# Patient Record
Sex: FEMALE | Race: BLACK OR AFRICAN AMERICAN | NOT HISPANIC OR LATINO
[De-identification: names, ages, dates, MRNs, and addresses within clinical notes are randomized per-mention and may not be internally consistent; named-entity substitution may affect disease eponyms.]

---

## 2017-01-05 ENCOUNTER — APPOINTMENT (OUTPATIENT)
Dept: HEART AND VASCULAR | Facility: CLINIC | Age: 82
End: 2017-01-05

## 2017-01-05 VITALS — DIASTOLIC BLOOD PRESSURE: 70 MMHG | HEART RATE: 63 BPM | SYSTOLIC BLOOD PRESSURE: 120 MMHG

## 2017-01-10 ENCOUNTER — FORM ENCOUNTER (OUTPATIENT)
Age: 82
End: 2017-01-10

## 2017-01-11 ENCOUNTER — OUTPATIENT (OUTPATIENT)
Dept: OUTPATIENT SERVICES | Facility: HOSPITAL | Age: 82
LOS: 1 days | End: 2017-01-11
Payer: MEDICARE

## 2017-01-11 ENCOUNTER — TRANSCRIPTION ENCOUNTER (OUTPATIENT)
Age: 82
End: 2017-01-11

## 2017-01-11 PROCEDURE — 27093 INJECTION FOR HIP X-RAY: CPT | Mod: LT

## 2017-01-11 PROCEDURE — 73525 CONTRAST X-RAY OF HIP: CPT

## 2017-01-11 PROCEDURE — 77002 NEEDLE LOCALIZATION BY XRAY: CPT | Mod: 26

## 2017-01-11 PROCEDURE — 27093 INJECTION FOR HIP X-RAY: CPT

## 2017-01-12 LAB
25(OH)D3 SERPL-MCNC: 32.4 NG/ML
ALBUMIN SERPL ELPH-MCNC: 4 G/DL
ALP BLD-CCNC: 74 U/L
ALT SERPL-CCNC: 21 U/L
ANION GAP SERPL CALC-SCNC: 13 MMOL/L
AST SERPL-CCNC: 24 U/L
BASOPHILS # BLD AUTO: 0.02 K/UL
BASOPHILS NFR BLD AUTO: 0.2 %
BILIRUB SERPL-MCNC: 0.3 MG/DL
BUN SERPL-MCNC: 22 MG/DL
CALCIUM SERPL-MCNC: 10.4 MG/DL
CHLORIDE SERPL-SCNC: 101 MMOL/L
CHOLEST SERPL-MCNC: 227 MG/DL
CHOLEST/HDLC SERPL: 4.1 RATIO
CO2 SERPL-SCNC: 26 MMOL/L
CREAT SERPL-MCNC: 1.22 MG/DL
CRP SERPL HS-MCNC: 6.8 MG/L
EOSINOPHIL # BLD AUTO: 0.14 K/UL
EOSINOPHIL NFR BLD AUTO: 1.7 %
FOLATE SERPL-MCNC: >20 NG/ML
GLUCOSE SERPL-MCNC: 106 MG/DL
HBA1C MFR BLD HPLC: 6.2 %
HCT VFR BLD CALC: 36.9 %
HDLC SERPL-MCNC: 56 MG/DL
HGB BLD-MCNC: 11.9 G/DL
IMM GRANULOCYTES NFR BLD AUTO: 0.4 %
LDLC SERPL CALC-MCNC: 161 MG/DL
LYMPHOCYTES # BLD AUTO: 1.63 K/UL
LYMPHOCYTES NFR BLD AUTO: 19.5 %
MAGNESIUM RBC-MCNC: 3.9 MG/DL
MAN DIFF?: NORMAL
MCHC RBC-ENTMCNC: 28.7 PG
MCHC RBC-ENTMCNC: 32.2 GM/DL
MCV RBC AUTO: 88.9 FL
MONOCYTES # BLD AUTO: 0.66 K/UL
MONOCYTES NFR BLD AUTO: 7.9 %
NEUTROPHILS # BLD AUTO: 5.89 K/UL
NEUTROPHILS NFR BLD AUTO: 70.3 %
NT-PROBNP SERPL-MCNC: 415 PG/ML
PLATELET # BLD AUTO: 320 K/UL
POTASSIUM SERPL-SCNC: 4.7 MMOL/L
PROT SERPL-MCNC: 7.1 G/DL
RBC # BLD: 4.15 M/UL
RBC # FLD: 13.6 %
SODIUM SERPL-SCNC: 140 MMOL/L
T3FREE SERPL-MCNC: 3.77 PG/ML
T4 FREE SERPL-MCNC: 1.6 NG/DL
TRIGL SERPL-MCNC: 49 MG/DL
TSH SERPL-ACNC: 0.1 UU/ML
VIT B12 SERPL-MCNC: 721 PG/ML
WBC # FLD AUTO: 8.37 K/UL

## 2017-01-13 ENCOUNTER — CLINICAL ADVICE (OUTPATIENT)
Age: 82
End: 2017-01-13

## 2017-01-18 ENCOUNTER — APPOINTMENT (OUTPATIENT)
Dept: HEART AND VASCULAR | Facility: CLINIC | Age: 82
End: 2017-01-18

## 2017-01-18 DIAGNOSIS — I87.2 VENOUS INSUFFICIENCY (CHRONIC) (PERIPHERAL): ICD-10-CM

## 2017-01-27 ENCOUNTER — APPOINTMENT (OUTPATIENT)
Dept: ORTHOPEDIC SURGERY | Facility: CLINIC | Age: 82
End: 2017-01-27

## 2017-01-27 DIAGNOSIS — M24.7 PROTRUSIO ACETABULI: ICD-10-CM

## 2017-02-02 ENCOUNTER — APPOINTMENT (OUTPATIENT)
Dept: HEART AND VASCULAR | Facility: CLINIC | Age: 82
End: 2017-02-02

## 2017-02-02 ENCOUNTER — APPOINTMENT (OUTPATIENT)
Dept: ENDOCRINOLOGY | Facility: CLINIC | Age: 82
End: 2017-02-02

## 2017-02-02 VITALS
HEIGHT: 60 IN | SYSTOLIC BLOOD PRESSURE: 144 MMHG | DIASTOLIC BLOOD PRESSURE: 84 MMHG | BODY MASS INDEX: 22.78 KG/M2 | WEIGHT: 116 LBS | HEART RATE: 72 BPM

## 2017-02-02 VITALS — HEART RATE: 63 BPM | DIASTOLIC BLOOD PRESSURE: 68 MMHG | SYSTOLIC BLOOD PRESSURE: 120 MMHG

## 2017-02-02 VITALS — SYSTOLIC BLOOD PRESSURE: 90 MMHG | DIASTOLIC BLOOD PRESSURE: 80 MMHG | HEART RATE: 63 BPM

## 2017-02-02 DIAGNOSIS — R06.02 SHORTNESS OF BREATH: ICD-10-CM

## 2017-02-02 DIAGNOSIS — R52 PAIN, UNSPECIFIED: ICD-10-CM

## 2017-02-14 ENCOUNTER — APPOINTMENT (OUTPATIENT)
Dept: INTERNAL MEDICINE | Facility: CLINIC | Age: 82
End: 2017-02-14

## 2017-02-14 VITALS
HEART RATE: 63 BPM | BODY MASS INDEX: 22.78 KG/M2 | DIASTOLIC BLOOD PRESSURE: 80 MMHG | TEMPERATURE: 98.6 F | OXYGEN SATURATION: 98 % | HEIGHT: 60 IN | WEIGHT: 116 LBS | SYSTOLIC BLOOD PRESSURE: 120 MMHG

## 2017-02-14 DIAGNOSIS — R10.2 PELVIC AND PERINEAL PAIN: ICD-10-CM

## 2017-02-23 ENCOUNTER — APPOINTMENT (OUTPATIENT)
Dept: ENDOCRINOLOGY | Facility: CLINIC | Age: 82
End: 2017-02-23

## 2017-02-23 VITALS
HEIGHT: 60 IN | WEIGHT: 123 LBS | HEART RATE: 67 BPM | BODY MASS INDEX: 24.15 KG/M2 | SYSTOLIC BLOOD PRESSURE: 116 MMHG | DIASTOLIC BLOOD PRESSURE: 73 MMHG

## 2017-03-01 ENCOUNTER — FORM ENCOUNTER (OUTPATIENT)
Age: 82
End: 2017-03-01

## 2017-03-02 ENCOUNTER — OUTPATIENT (OUTPATIENT)
Dept: OUTPATIENT SERVICES | Facility: HOSPITAL | Age: 82
LOS: 1 days | End: 2017-03-02
Payer: MEDICARE

## 2017-03-02 PROCEDURE — 76536 US EXAM OF HEAD AND NECK: CPT

## 2017-03-02 PROCEDURE — 76830 TRANSVAGINAL US NON-OB: CPT

## 2017-03-02 PROCEDURE — 76830 TRANSVAGINAL US NON-OB: CPT | Mod: 26

## 2017-03-02 PROCEDURE — 76856 US EXAM PELVIC COMPLETE: CPT | Mod: 26

## 2017-03-02 PROCEDURE — 76856 US EXAM PELVIC COMPLETE: CPT

## 2017-03-02 PROCEDURE — 76536 US EXAM OF HEAD AND NECK: CPT | Mod: 26

## 2017-03-10 ENCOUNTER — APPOINTMENT (OUTPATIENT)
Dept: OPHTHALMOLOGY | Facility: CLINIC | Age: 82
End: 2017-03-10

## 2017-03-10 DIAGNOSIS — H40.9 UNSPECIFIED GLAUCOMA: ICD-10-CM

## 2017-03-10 DIAGNOSIS — L30.9 DERMATITIS, UNSPECIFIED: ICD-10-CM

## 2017-03-10 DIAGNOSIS — H53.149 VISUAL DISCOMFORT, UNSPECIFIED: ICD-10-CM

## 2017-03-20 ENCOUNTER — APPOINTMENT (OUTPATIENT)
Dept: NEPHROLOGY | Facility: CLINIC | Age: 82
End: 2017-03-20

## 2017-03-20 VITALS
DIASTOLIC BLOOD PRESSURE: 70 MMHG | RESPIRATION RATE: 1 BRPM | TEMPERATURE: 98 F | BODY MASS INDEX: 23.83 KG/M2 | SYSTOLIC BLOOD PRESSURE: 150 MMHG | WEIGHT: 122 LBS | HEART RATE: 78 BPM

## 2017-04-23 ENCOUNTER — FORM ENCOUNTER (OUTPATIENT)
Age: 82
End: 2017-04-23

## 2017-04-23 ENCOUNTER — RESULT REVIEW (OUTPATIENT)
Age: 82
End: 2017-04-23

## 2017-04-24 ENCOUNTER — OUTPATIENT (OUTPATIENT)
Dept: OUTPATIENT SERVICES | Facility: HOSPITAL | Age: 82
LOS: 1 days | End: 2017-04-24
Payer: MEDICARE

## 2017-04-24 PROCEDURE — 76942 ECHO GUIDE FOR BIOPSY: CPT | Mod: 26

## 2017-04-24 PROCEDURE — 76942 ECHO GUIDE FOR BIOPSY: CPT

## 2017-04-24 PROCEDURE — 10022: CPT

## 2017-04-24 PROCEDURE — 88173 CYTOPATH EVAL FNA REPORT: CPT

## 2017-04-25 ENCOUNTER — LABORATORY RESULT (OUTPATIENT)
Age: 82
End: 2017-04-25

## 2017-04-25 ENCOUNTER — APPOINTMENT (OUTPATIENT)
Dept: INTERNAL MEDICINE | Facility: CLINIC | Age: 82
End: 2017-04-25

## 2017-04-25 VITALS
SYSTOLIC BLOOD PRESSURE: 130 MMHG | WEIGHT: 120 LBS | BODY MASS INDEX: 23.56 KG/M2 | DIASTOLIC BLOOD PRESSURE: 70 MMHG | TEMPERATURE: 98.1 F | HEIGHT: 60 IN | OXYGEN SATURATION: 97 % | HEART RATE: 61 BPM

## 2017-04-25 DIAGNOSIS — H61.22 IMPACTED CERUMEN, LEFT EAR: ICD-10-CM

## 2017-04-25 LAB — NON-GYNECOLOGICAL CYTOLOGY STUDY: SIGNIFICANT CHANGE UP

## 2017-04-26 ENCOUNTER — APPOINTMENT (OUTPATIENT)
Dept: ORTHOPEDIC SURGERY | Facility: CLINIC | Age: 82
End: 2017-04-26

## 2017-05-02 ENCOUNTER — APPOINTMENT (OUTPATIENT)
Dept: ENDOCRINOLOGY | Facility: CLINIC | Age: 82
End: 2017-05-02

## 2017-05-08 ENCOUNTER — APPOINTMENT (OUTPATIENT)
Dept: NEPHROLOGY | Facility: CLINIC | Age: 82
End: 2017-05-08

## 2017-05-08 VITALS
WEIGHT: 122 LBS | TEMPERATURE: 98 F | BODY MASS INDEX: 23.83 KG/M2 | HEART RATE: 56 BPM | DIASTOLIC BLOOD PRESSURE: 70 MMHG | RESPIRATION RATE: 18 BRPM | SYSTOLIC BLOOD PRESSURE: 150 MMHG

## 2017-05-08 DIAGNOSIS — R10.33 PERIUMBILICAL PAIN: ICD-10-CM

## 2017-05-08 DIAGNOSIS — R14.0 ABDOMINAL DISTENSION (GASEOUS): ICD-10-CM

## 2017-05-12 ENCOUNTER — APPOINTMENT (OUTPATIENT)
Dept: OPHTHALMOLOGY | Facility: CLINIC | Age: 82
End: 2017-05-12

## 2017-05-23 LAB
ALBUMIN SERPL ELPH-MCNC: 4.2 G/DL
ALP BLD-CCNC: 74 U/L
ALT SERPL-CCNC: 19 U/L
ANION GAP SERPL CALC-SCNC: 15 MMOL/L
AST SERPL-CCNC: 21 U/L
BASOPHILS # BLD AUTO: 0.02 K/UL
BASOPHILS NFR BLD AUTO: 0.2 %
BILIRUB SERPL-MCNC: 0.4 MG/DL
BUN SERPL-MCNC: 14 MG/DL
CALCIUM SERPL-MCNC: 10.2 MG/DL
CHLORIDE SERPL-SCNC: 101 MMOL/L
CO2 SERPL-SCNC: 24 MMOL/L
CREAT SERPL-MCNC: 1.18 MG/DL
EOSINOPHIL # BLD AUTO: 0.16 K/UL
EOSINOPHIL NFR BLD AUTO: 1.9 %
GLUCOSE SERPL-MCNC: 92 MG/DL
HBA1C MFR BLD HPLC: 6.1 %
HCT VFR BLD CALC: 37.9 %
HGB BLD-MCNC: 12.1 G/DL
IMM GRANULOCYTES NFR BLD AUTO: 0.2 %
LYMPHOCYTES # BLD AUTO: 2.77 K/UL
LYMPHOCYTES NFR BLD AUTO: 32.3 %
MAN DIFF?: NORMAL
MCHC RBC-ENTMCNC: 28.5 PG
MCHC RBC-ENTMCNC: 31.9 GM/DL
MCV RBC AUTO: 89.4 FL
MONOCYTES # BLD AUTO: 0.57 K/UL
MONOCYTES NFR BLD AUTO: 6.7 %
NEUTROPHILS # BLD AUTO: 5.03 K/UL
NEUTROPHILS NFR BLD AUTO: 58.7 %
PLATELET # BLD AUTO: 233 K/UL
POTASSIUM SERPL-SCNC: 5 MMOL/L
PROT SERPL-MCNC: 7.3 G/DL
RBC # BLD: 4.24 M/UL
RBC # FLD: 14.2 %
SODIUM SERPL-SCNC: 140 MMOL/L
WBC # FLD AUTO: 8.57 K/UL

## 2017-06-01 ENCOUNTER — CLINICAL ADVICE (OUTPATIENT)
Age: 82
End: 2017-06-01

## 2017-06-19 ENCOUNTER — APPOINTMENT (OUTPATIENT)
Dept: OPHTHALMOLOGY | Facility: CLINIC | Age: 82
End: 2017-06-19

## 2017-06-20 ENCOUNTER — APPOINTMENT (OUTPATIENT)
Dept: ENDOCRINOLOGY | Facility: CLINIC | Age: 82
End: 2017-06-20

## 2017-06-26 ENCOUNTER — APPOINTMENT (OUTPATIENT)
Dept: HEART AND VASCULAR | Facility: CLINIC | Age: 82
End: 2017-06-26

## 2017-06-26 ENCOUNTER — RX RENEWAL (OUTPATIENT)
Age: 82
End: 2017-06-26

## 2017-06-26 VITALS
HEART RATE: 57 BPM | BODY MASS INDEX: 22.45 KG/M2 | HEIGHT: 62 IN | DIASTOLIC BLOOD PRESSURE: 80 MMHG | SYSTOLIC BLOOD PRESSURE: 140 MMHG | WEIGHT: 122 LBS

## 2017-06-26 VITALS — SYSTOLIC BLOOD PRESSURE: 126 MMHG | DIASTOLIC BLOOD PRESSURE: 78 MMHG | HEART RATE: 56 BPM

## 2017-06-27 ENCOUNTER — APPOINTMENT (OUTPATIENT)
Dept: ENDOCRINOLOGY | Facility: CLINIC | Age: 82
End: 2017-06-27

## 2017-06-27 VITALS
HEART RATE: 64 BPM | SYSTOLIC BLOOD PRESSURE: 138 MMHG | BODY MASS INDEX: 22.31 KG/M2 | DIASTOLIC BLOOD PRESSURE: 71 MMHG | WEIGHT: 122 LBS

## 2017-06-27 LAB
25(OH)D3 SERPL-MCNC: 27 NG/ML
ALBUMIN SERPL ELPH-MCNC: 4.4 G/DL
ALP BLD-CCNC: 72 U/L
ALT SERPL-CCNC: 14 U/L
ANION GAP SERPL CALC-SCNC: 15 MMOL/L
AST SERPL-CCNC: 21 U/L
BASOPHILS # BLD AUTO: 0.02 K/UL
BASOPHILS NFR BLD AUTO: 0.3 %
BILIRUB SERPL-MCNC: 0.4 MG/DL
BUN SERPL-MCNC: 24 MG/DL
CALCIUM SERPL-MCNC: 9.7 MG/DL
CHLORIDE SERPL-SCNC: 100 MMOL/L
CHOLEST SERPL-MCNC: 273 MG/DL
CHOLEST/HDLC SERPL: 3.9 RATIO
CO2 SERPL-SCNC: 25 MMOL/L
CREAT SERPL-MCNC: 1.24 MG/DL
CRP SERPL HS-MCNC: 0.7 MG/L
EOSINOPHIL # BLD AUTO: 0.1 K/UL
EOSINOPHIL NFR BLD AUTO: 1.5 %
FOLATE SERPL-MCNC: >20 NG/ML
GLUCOSE SERPL-MCNC: 99 MG/DL
HBA1C MFR BLD HPLC: 5.9 %
HCT VFR BLD CALC: 38.9 %
HDLC SERPL-MCNC: 70 MG/DL
HGB BLD-MCNC: 12.6 G/DL
IMM GRANULOCYTES NFR BLD AUTO: 0.1 %
IRON SATN MFR SERPL: 30 %
IRON SERPL-MCNC: 88 UG/DL
LDLC SERPL CALC-MCNC: 187 MG/DL
LYMPHOCYTES # BLD AUTO: 1.68 K/UL
LYMPHOCYTES NFR BLD AUTO: 24.4 %
MAN DIFF?: NORMAL
MCHC RBC-ENTMCNC: 28.3 PG
MCHC RBC-ENTMCNC: 32.4 GM/DL
MCV RBC AUTO: 87.4 FL
MONOCYTES # BLD AUTO: 0.39 K/UL
MONOCYTES NFR BLD AUTO: 5.7 %
NEUTROPHILS # BLD AUTO: 4.68 K/UL
NEUTROPHILS NFR BLD AUTO: 68 %
NT-PROBNP SERPL-MCNC: 336 PG/ML
PLATELET # BLD AUTO: 247 K/UL
POTASSIUM SERPL-SCNC: 4.2 MMOL/L
PROT SERPL-MCNC: 7.4 G/DL
RBC # BLD: 4.45 M/UL
RBC # FLD: 14.4 %
SODIUM SERPL-SCNC: 140 MMOL/L
T3FREE SERPL-MCNC: 3.88 PG/ML
T4 FREE SERPL-MCNC: 1.3 NG/DL
TIBC SERPL-MCNC: 292 UG/DL
TRIGL SERPL-MCNC: 80 MG/DL
TSH SERPL-ACNC: 0.22 UIU/ML
UIBC SERPL-MCNC: 204 UG/DL
VIT B12 SERPL-MCNC: 660 PG/ML
WBC # FLD AUTO: 6.88 K/UL

## 2017-06-29 ENCOUNTER — RESULT REVIEW (OUTPATIENT)
Age: 82
End: 2017-06-29

## 2017-06-29 LAB — MAGNESIUM RBC-MCNC: 3.6 MG/DL

## 2017-07-17 ENCOUNTER — APPOINTMENT (OUTPATIENT)
Dept: NEPHROLOGY | Facility: CLINIC | Age: 82
End: 2017-07-17

## 2017-07-17 VITALS
TEMPERATURE: 98 F | WEIGHT: 122 LBS | HEART RATE: 76 BPM | DIASTOLIC BLOOD PRESSURE: 70 MMHG | SYSTOLIC BLOOD PRESSURE: 130 MMHG | BODY MASS INDEX: 22.31 KG/M2

## 2017-07-17 DIAGNOSIS — G89.29 PAIN IN LEFT HIP: ICD-10-CM

## 2017-07-17 DIAGNOSIS — M25.552 PAIN IN LEFT HIP: ICD-10-CM

## 2017-07-25 ENCOUNTER — APPOINTMENT (OUTPATIENT)
Dept: INTERNAL MEDICINE | Facility: CLINIC | Age: 82
End: 2017-07-25

## 2017-07-25 VITALS
TEMPERATURE: 98.2 F | DIASTOLIC BLOOD PRESSURE: 72 MMHG | SYSTOLIC BLOOD PRESSURE: 134 MMHG | RESPIRATION RATE: 14 BRPM | HEIGHT: 62 IN | BODY MASS INDEX: 22.08 KG/M2 | HEART RATE: 60 BPM | WEIGHT: 120 LBS | OXYGEN SATURATION: 98 %

## 2017-07-25 DIAGNOSIS — E63.9 NUTRITIONAL DEFICIENCY, UNSPECIFIED: ICD-10-CM

## 2017-07-25 RX ORDER — ASPIRIN 81 MG
6.5 TABLET, DELAYED RELEASE (ENTERIC COATED) ORAL
Qty: 1 | Refills: 0 | Status: COMPLETED | COMMUNITY
Start: 2017-04-25 | End: 2017-07-25

## 2017-07-26 ENCOUNTER — INPATIENT (INPATIENT)
Facility: HOSPITAL | Age: 82
LOS: 2 days | Discharge: HOME | DRG: 247 | End: 2017-07-29
Attending: INTERNAL MEDICINE | Admitting: INTERNAL MEDICINE
Payer: MEDICARE

## 2017-07-26 VITALS
WEIGHT: 134.92 LBS | OXYGEN SATURATION: 100 % | DIASTOLIC BLOOD PRESSURE: 101 MMHG | SYSTOLIC BLOOD PRESSURE: 187 MMHG | TEMPERATURE: 98 F | RESPIRATION RATE: 18 BRPM | HEIGHT: 64 IN | HEART RATE: 56 BPM

## 2017-07-26 LAB
ALBUMIN SERPL ELPH-MCNC: 4 G/DL — SIGNIFICANT CHANGE UP (ref 3.3–5)
ALP SERPL-CCNC: 73 U/L — SIGNIFICANT CHANGE UP (ref 40–120)
ALT FLD-CCNC: 11 U/L — SIGNIFICANT CHANGE UP (ref 10–45)
ANION GAP SERPL CALC-SCNC: 11 MMOL/L — SIGNIFICANT CHANGE UP (ref 5–17)
APTT BLD: 36.4 SEC — SIGNIFICANT CHANGE UP (ref 27.5–37.4)
AST SERPL-CCNC: 19 U/L — SIGNIFICANT CHANGE UP (ref 10–40)
BASOPHILS NFR BLD AUTO: 0.4 % — SIGNIFICANT CHANGE UP (ref 0–2)
BILIRUB SERPL-MCNC: 0.2 MG/DL — SIGNIFICANT CHANGE UP (ref 0.2–1.2)
BUN SERPL-MCNC: 23 MG/DL — SIGNIFICANT CHANGE UP (ref 7–23)
CALCIUM SERPL-MCNC: 10.1 MG/DL — SIGNIFICANT CHANGE UP (ref 8.4–10.5)
CHLORIDE SERPL-SCNC: 103 MMOL/L — SIGNIFICANT CHANGE UP (ref 96–108)
CK MB CFR SERPL CALC: 6.9 NG/ML — HIGH (ref 0–6.7)
CK SERPL-CCNC: 137 U/L — SIGNIFICANT CHANGE UP (ref 25–170)
CO2 SERPL-SCNC: 24 MMOL/L — SIGNIFICANT CHANGE UP (ref 22–31)
CREAT SERPL-MCNC: 1.3 MG/DL — SIGNIFICANT CHANGE UP (ref 0.5–1.3)
EOSINOPHIL NFR BLD AUTO: 3.9 % — SIGNIFICANT CHANGE UP (ref 0–6)
GLUCOSE SERPL-MCNC: 110 MG/DL — HIGH (ref 70–99)
HCT VFR BLD CALC: 37.4 % — SIGNIFICANT CHANGE UP (ref 34.5–45)
HGB BLD-MCNC: 12.1 G/DL — SIGNIFICANT CHANGE UP (ref 11.5–15.5)
INR BLD: 1.01 — SIGNIFICANT CHANGE UP (ref 0.88–1.16)
LYMPHOCYTES # BLD AUTO: 31.2 % — SIGNIFICANT CHANGE UP (ref 13–44)
MCHC RBC-ENTMCNC: 27.9 PG — SIGNIFICANT CHANGE UP (ref 27–34)
MCHC RBC-ENTMCNC: 32.4 G/DL — SIGNIFICANT CHANGE UP (ref 32–36)
MCV RBC AUTO: 86.2 FL — SIGNIFICANT CHANGE UP (ref 80–100)
MONOCYTES NFR BLD AUTO: 9.6 % — SIGNIFICANT CHANGE UP (ref 2–14)
NEUTROPHILS NFR BLD AUTO: 54.9 % — SIGNIFICANT CHANGE UP (ref 43–77)
PLATELET # BLD AUTO: 190 K/UL — SIGNIFICANT CHANGE UP (ref 150–400)
POTASSIUM SERPL-MCNC: 3.7 MMOL/L — SIGNIFICANT CHANGE UP (ref 3.5–5.3)
POTASSIUM SERPL-SCNC: 3.7 MMOL/L — SIGNIFICANT CHANGE UP (ref 3.5–5.3)
PROT SERPL-MCNC: 7.5 G/DL — SIGNIFICANT CHANGE UP (ref 6–8.3)
PROTHROM AB SERPL-ACNC: 11.2 SEC — SIGNIFICANT CHANGE UP (ref 9.8–12.7)
RBC # BLD: 4.34 M/UL — SIGNIFICANT CHANGE UP (ref 3.8–5.2)
RBC # FLD: 13.3 % — SIGNIFICANT CHANGE UP (ref 10.3–16.9)
SODIUM SERPL-SCNC: 138 MMOL/L — SIGNIFICANT CHANGE UP (ref 135–145)
TROPONIN T SERPL-MCNC: 0.04 NG/ML — HIGH (ref 0–0.01)
WBC # BLD: 7 K/UL — SIGNIFICANT CHANGE UP (ref 3.8–10.5)
WBC # FLD AUTO: 7 K/UL — SIGNIFICANT CHANGE UP (ref 3.8–10.5)

## 2017-07-26 PROCEDURE — 71010: CPT | Mod: 26

## 2017-07-26 PROCEDURE — 99285 EMERGENCY DEPT VISIT HI MDM: CPT | Mod: 25

## 2017-07-26 PROCEDURE — 93010 ELECTROCARDIOGRAM REPORT: CPT

## 2017-07-26 RX ORDER — ASPIRIN/CALCIUM CARB/MAGNESIUM 324 MG
325 TABLET ORAL ONCE
Qty: 0 | Refills: 0 | Status: COMPLETED | OUTPATIENT
Start: 2017-07-26 | End: 2017-07-26

## 2017-07-26 NOTE — ED ADULT NURSE NOTE - OBJECTIVE STATEMENT
81 yr old female presents to the ed with c.o left sided upper chest pain. states she had pain since 9pm, "wasn't able to get off of couch". denies any injury. denies any nausea, vomiting, anahy or sob. no distress noted. evaluated by PA. will continues to monitor.

## 2017-07-26 NOTE — ED ADULT TRIAGE NOTE - CHIEF COMPLAINT QUOTE
Patient complaining of reproducible chest and back pain. Patient dewnies chest pain at present time. No obvious distress.

## 2017-07-27 DIAGNOSIS — I10 ESSENTIAL (PRIMARY) HYPERTENSION: ICD-10-CM

## 2017-07-27 DIAGNOSIS — E87.6 HYPOKALEMIA: ICD-10-CM

## 2017-07-27 DIAGNOSIS — R07.9 CHEST PAIN, UNSPECIFIED: ICD-10-CM

## 2017-07-27 DIAGNOSIS — R10.9 UNSPECIFIED ABDOMINAL PAIN: ICD-10-CM

## 2017-07-27 DIAGNOSIS — Z78.9 OTHER SPECIFIED HEALTH STATUS: ICD-10-CM

## 2017-07-27 DIAGNOSIS — I50.32 CHRONIC DIASTOLIC (CONGESTIVE) HEART FAILURE: ICD-10-CM

## 2017-07-27 DIAGNOSIS — R10.812 LEFT UPPER QUADRANT ABDOMINAL TENDERNESS: ICD-10-CM

## 2017-07-27 DIAGNOSIS — R60.0 LOCALIZED EDEMA: ICD-10-CM

## 2017-07-27 DIAGNOSIS — I50.22 CHRONIC SYSTOLIC (CONGESTIVE) HEART FAILURE: ICD-10-CM

## 2017-07-27 DIAGNOSIS — E83.42 HYPOMAGNESEMIA: ICD-10-CM

## 2017-07-27 DIAGNOSIS — I25.10 ATHEROSCLEROTIC HEART DISEASE OF NATIVE CORONARY ARTERY WITHOUT ANGINA PECTORIS: ICD-10-CM

## 2017-07-27 DIAGNOSIS — N18.2 CHRONIC KIDNEY DISEASE, STAGE 2 (MILD): ICD-10-CM

## 2017-07-27 LAB
AMYLASE P1 CFR SERPL: 76 U/L — SIGNIFICANT CHANGE UP (ref 25–125)
ANION GAP SERPL CALC-SCNC: 10 MMOL/L — SIGNIFICANT CHANGE UP (ref 5–17)
BASOPHILS NFR BLD AUTO: 0.6 % — SIGNIFICANT CHANGE UP (ref 0–2)
BUN SERPL-MCNC: 19 MG/DL — SIGNIFICANT CHANGE UP (ref 7–23)
CALCIUM SERPL-MCNC: 9 MG/DL — SIGNIFICANT CHANGE UP (ref 8.4–10.5)
CHLORIDE SERPL-SCNC: 104 MMOL/L — SIGNIFICANT CHANGE UP (ref 96–108)
CK MB CFR SERPL CALC: 5.3 NG/ML — SIGNIFICANT CHANGE UP (ref 0–6.7)
CK SERPL-CCNC: 101 U/L — SIGNIFICANT CHANGE UP (ref 25–170)
CK SERPL-CCNC: 98 U/L — SIGNIFICANT CHANGE UP (ref 25–170)
CO2 SERPL-SCNC: 24 MMOL/L — SIGNIFICANT CHANGE UP (ref 22–31)
CREAT SERPL-MCNC: 1 MG/DL — SIGNIFICANT CHANGE UP (ref 0.5–1.3)
EOSINOPHIL NFR BLD AUTO: 3.6 % — SIGNIFICANT CHANGE UP (ref 0–6)
GLUCOSE SERPL-MCNC: 91 MG/DL — SIGNIFICANT CHANGE UP (ref 70–99)
HBA1C BLD-MCNC: 5.7 % — HIGH (ref 4–5.6)
HCT VFR BLD CALC: 32.7 % — LOW (ref 34.5–45)
HGB BLD-MCNC: 11 G/DL — LOW (ref 11.5–15.5)
LIDOCAIN IGE QN: 24 U/L — SIGNIFICANT CHANGE UP (ref 7–60)
LYMPHOCYTES # BLD AUTO: 40.7 % — SIGNIFICANT CHANGE UP (ref 13–44)
MAGNESIUM SERPL-MCNC: 1.9 MG/DL — SIGNIFICANT CHANGE UP (ref 1.6–2.6)
MCHC RBC-ENTMCNC: 28.4 PG — SIGNIFICANT CHANGE UP (ref 27–34)
MCHC RBC-ENTMCNC: 33.6 G/DL — SIGNIFICANT CHANGE UP (ref 32–36)
MCV RBC AUTO: 84.5 FL — SIGNIFICANT CHANGE UP (ref 80–100)
MONOCYTES NFR BLD AUTO: 11.1 % — SIGNIFICANT CHANGE UP (ref 2–14)
NEUTROPHILS NFR BLD AUTO: 44 % — SIGNIFICANT CHANGE UP (ref 43–77)
PLATELET # BLD AUTO: 193 K/UL — SIGNIFICANT CHANGE UP (ref 150–400)
POTASSIUM SERPL-MCNC: 3.9 MMOL/L — SIGNIFICANT CHANGE UP (ref 3.5–5.3)
POTASSIUM SERPL-SCNC: 3.9 MMOL/L — SIGNIFICANT CHANGE UP (ref 3.5–5.3)
RBC # BLD: 3.87 M/UL — SIGNIFICANT CHANGE UP (ref 3.8–5.2)
RBC # FLD: 13.6 % — SIGNIFICANT CHANGE UP (ref 10.3–16.9)
SODIUM SERPL-SCNC: 138 MMOL/L — SIGNIFICANT CHANGE UP (ref 135–145)
TROPONIN T SERPL-MCNC: 0.03 NG/ML — HIGH (ref 0–0.01)
TROPONIN T SERPL-MCNC: 0.03 NG/ML — HIGH (ref 0–0.01)
WBC # BLD: 6.3 K/UL — SIGNIFICANT CHANGE UP (ref 3.8–10.5)
WBC # FLD AUTO: 6.3 K/UL — SIGNIFICANT CHANGE UP (ref 3.8–10.5)

## 2017-07-27 PROCEDURE — 93306 TTE W/DOPPLER COMPLETE: CPT | Mod: 26

## 2017-07-27 RX ORDER — FOLIC ACID 0.8 MG
1 TABLET ORAL
Qty: 0 | Refills: 0 | COMMUNITY

## 2017-07-27 RX ORDER — CITALOPRAM 10 MG/1
10 TABLET, FILM COATED ORAL DAILY
Qty: 0 | Refills: 0 | Status: DISCONTINUED | OUTPATIENT
Start: 2017-07-27 | End: 2017-07-29

## 2017-07-27 RX ORDER — DEXTROSE 50 % IN WATER 50 %
25 SYRINGE (ML) INTRAVENOUS ONCE
Qty: 0 | Refills: 0 | Status: DISCONTINUED | OUTPATIENT
Start: 2017-07-27 | End: 2017-07-29

## 2017-07-27 RX ORDER — SODIUM CHLORIDE 9 MG/ML
1000 INJECTION, SOLUTION INTRAVENOUS
Qty: 0 | Refills: 0 | Status: DISCONTINUED | OUTPATIENT
Start: 2017-07-27 | End: 2017-07-29

## 2017-07-27 RX ORDER — ISOSORBIDE MONONITRATE 60 MG/1
1 TABLET, EXTENDED RELEASE ORAL
Qty: 0 | Refills: 0 | COMMUNITY
Start: 2017-07-27

## 2017-07-27 RX ORDER — METOPROLOL TARTRATE 50 MG
25 TABLET ORAL
Qty: 0 | Refills: 0 | Status: DISCONTINUED | OUTPATIENT
Start: 2017-07-27 | End: 2017-07-29

## 2017-07-27 RX ORDER — KETOROLAC TROMETHAMINE 0.5 %
1 DROPS OPHTHALMIC (EYE)
Qty: 0 | Refills: 0 | Status: DISCONTINUED | OUTPATIENT
Start: 2017-07-27 | End: 2017-07-29

## 2017-07-27 RX ORDER — ATORVASTATIN CALCIUM 80 MG/1
80 TABLET, FILM COATED ORAL AT BEDTIME
Qty: 0 | Refills: 0 | Status: DISCONTINUED | OUTPATIENT
Start: 2017-07-27 | End: 2017-07-29

## 2017-07-27 RX ORDER — FUROSEMIDE 40 MG
20 TABLET ORAL DAILY
Qty: 0 | Refills: 0 | Status: DISCONTINUED | OUTPATIENT
Start: 2017-07-27 | End: 2017-07-29

## 2017-07-27 RX ORDER — INSULIN LISPRO 100/ML
VIAL (ML) SUBCUTANEOUS
Qty: 0 | Refills: 0 | Status: DISCONTINUED | OUTPATIENT
Start: 2017-07-27 | End: 2017-07-29

## 2017-07-27 RX ORDER — ACETAMINOPHEN 500 MG
650 TABLET ORAL ONCE
Qty: 0 | Refills: 0 | Status: COMPLETED | OUTPATIENT
Start: 2017-07-27 | End: 2017-07-27

## 2017-07-27 RX ORDER — FOLIC ACID 0.8 MG
1 TABLET ORAL DAILY
Qty: 0 | Refills: 0 | Status: DISCONTINUED | OUTPATIENT
Start: 2017-07-27 | End: 2017-07-29

## 2017-07-27 RX ORDER — HEPARIN SODIUM 5000 [USP'U]/ML
5000 INJECTION INTRAVENOUS; SUBCUTANEOUS EVERY 12 HOURS
Qty: 0 | Refills: 0 | Status: DISCONTINUED | OUTPATIENT
Start: 2017-07-27 | End: 2017-07-29

## 2017-07-27 RX ORDER — DEXTROSE 50 % IN WATER 50 %
1 SYRINGE (ML) INTRAVENOUS ONCE
Qty: 0 | Refills: 0 | Status: DISCONTINUED | OUTPATIENT
Start: 2017-07-27 | End: 2017-07-29

## 2017-07-27 RX ORDER — ACETAMINOPHEN 500 MG
650 TABLET ORAL ONCE
Qty: 0 | Refills: 0 | Status: COMPLETED | OUTPATIENT
Start: 2017-07-27 | End: 2017-07-28

## 2017-07-27 RX ORDER — ASPIRIN/CALCIUM CARB/MAGNESIUM 324 MG
81 TABLET ORAL DAILY
Qty: 0 | Refills: 0 | Status: DISCONTINUED | OUTPATIENT
Start: 2017-07-27 | End: 2017-07-27

## 2017-07-27 RX ORDER — CLOPIDOGREL BISULFATE 75 MG/1
600 TABLET, FILM COATED ORAL ONCE
Qty: 0 | Refills: 0 | Status: COMPLETED | OUTPATIENT
Start: 2017-07-28 | End: 2017-07-28

## 2017-07-27 RX ORDER — POTASSIUM CHLORIDE 20 MEQ
20 PACKET (EA) ORAL ONCE
Qty: 0 | Refills: 0 | Status: COMPLETED | OUTPATIENT
Start: 2017-07-27 | End: 2017-07-27

## 2017-07-27 RX ORDER — MAGNESIUM OXIDE 400 MG ORAL TABLET 241.3 MG
800 TABLET ORAL ONCE
Qty: 0 | Refills: 0 | Status: COMPLETED | OUTPATIENT
Start: 2017-07-27 | End: 2017-07-27

## 2017-07-27 RX ORDER — ISOSORBIDE MONONITRATE 60 MG/1
60 TABLET, EXTENDED RELEASE ORAL DAILY
Qty: 0 | Refills: 0 | Status: DISCONTINUED | OUTPATIENT
Start: 2017-07-27 | End: 2017-07-29

## 2017-07-27 RX ORDER — ASPIRIN/CALCIUM CARB/MAGNESIUM 324 MG
81 TABLET ORAL DAILY
Qty: 0 | Refills: 0 | Status: DISCONTINUED | OUTPATIENT
Start: 2017-07-29 | End: 2017-07-29

## 2017-07-27 RX ORDER — GLUCAGON INJECTION, SOLUTION 0.5 MG/.1ML
1 INJECTION, SOLUTION SUBCUTANEOUS ONCE
Qty: 0 | Refills: 0 | Status: DISCONTINUED | OUTPATIENT
Start: 2017-07-27 | End: 2017-07-29

## 2017-07-27 RX ORDER — PANTOPRAZOLE SODIUM 20 MG/1
40 TABLET, DELAYED RELEASE ORAL DAILY
Qty: 0 | Refills: 0 | Status: DISCONTINUED | OUTPATIENT
Start: 2017-07-27 | End: 2017-07-27

## 2017-07-27 RX ORDER — ATORVASTATIN CALCIUM 80 MG/1
80 TABLET, FILM COATED ORAL
Qty: 0 | Refills: 0 | COMMUNITY

## 2017-07-27 RX ORDER — SODIUM CHLORIDE 9 MG/ML
1000 INJECTION INTRAMUSCULAR; INTRAVENOUS; SUBCUTANEOUS
Qty: 0 | Refills: 0 | Status: DISCONTINUED | OUTPATIENT
Start: 2017-07-28 | End: 2017-07-29

## 2017-07-27 RX ORDER — ASPIRIN/CALCIUM CARB/MAGNESIUM 324 MG
1 TABLET ORAL
Qty: 0 | Refills: 0 | COMMUNITY

## 2017-07-27 RX ORDER — ASPIRIN/CALCIUM CARB/MAGNESIUM 324 MG
325 TABLET ORAL ONCE
Qty: 0 | Refills: 0 | Status: COMPLETED | OUTPATIENT
Start: 2017-07-28 | End: 2017-07-28

## 2017-07-27 RX ORDER — METHYLPREDNISOLONE 4 MG
250 TABLET ORAL DAILY
Qty: 0 | Refills: 0 | Status: DISCONTINUED | OUTPATIENT
Start: 2017-07-27 | End: 2017-07-27

## 2017-07-27 RX ORDER — DEXTROSE 50 % IN WATER 50 %
12.5 SYRINGE (ML) INTRAVENOUS ONCE
Qty: 0 | Refills: 0 | Status: DISCONTINUED | OUTPATIENT
Start: 2017-07-27 | End: 2017-07-29

## 2017-07-27 RX ORDER — LISINOPRIL 2.5 MG/1
20 TABLET ORAL DAILY
Qty: 0 | Refills: 0 | Status: DISCONTINUED | OUTPATIENT
Start: 2017-07-27 | End: 2017-07-27

## 2017-07-27 RX ORDER — PANTOPRAZOLE SODIUM 20 MG/1
40 TABLET, DELAYED RELEASE ORAL
Qty: 0 | Refills: 0 | Status: DISCONTINUED | OUTPATIENT
Start: 2017-07-27 | End: 2017-07-29

## 2017-07-27 RX ADMIN — ATORVASTATIN CALCIUM 80 MILLIGRAM(S): 80 TABLET, FILM COATED ORAL at 22:28

## 2017-07-27 RX ADMIN — Medication 325 MILLIGRAM(S): at 00:09

## 2017-07-27 RX ADMIN — HEPARIN SODIUM 5000 UNIT(S): 5000 INJECTION INTRAVENOUS; SUBCUTANEOUS at 06:16

## 2017-07-27 RX ADMIN — LISINOPRIL 20 MILLIGRAM(S): 2.5 TABLET ORAL at 06:16

## 2017-07-27 RX ADMIN — ISOSORBIDE MONONITRATE 60 MILLIGRAM(S): 60 TABLET, EXTENDED RELEASE ORAL at 15:11

## 2017-07-27 RX ADMIN — Medication 650 MILLIGRAM(S): at 19:40

## 2017-07-27 RX ADMIN — Medication 20 MILLIEQUIVALENT(S): at 15:12

## 2017-07-27 RX ADMIN — Medication 650 MILLIGRAM(S): at 22:26

## 2017-07-27 RX ADMIN — CITALOPRAM 10 MILLIGRAM(S): 10 TABLET, FILM COATED ORAL at 15:11

## 2017-07-27 RX ADMIN — Medication 25 MILLIGRAM(S): at 17:43

## 2017-07-27 RX ADMIN — Medication 81 MILLIGRAM(S): at 15:11

## 2017-07-27 RX ADMIN — HEPARIN SODIUM 5000 UNIT(S): 5000 INJECTION INTRAVENOUS; SUBCUTANEOUS at 17:43

## 2017-07-27 RX ADMIN — Medication 20 MILLIGRAM(S): at 06:16

## 2017-07-27 RX ADMIN — Medication 1 DROP(S): at 17:43

## 2017-07-27 RX ADMIN — Medication 1 MILLIGRAM(S): at 15:11

## 2017-07-27 RX ADMIN — MAGNESIUM OXIDE 400 MG ORAL TABLET 800 MILLIGRAM(S): 241.3 TABLET ORAL at 18:05

## 2017-07-27 RX ADMIN — Medication 1 DROP(S): at 07:52

## 2017-07-27 NOTE — PROGRESS NOTE ADULT - PROBLEM SELECTOR PLAN 1
Patient with history of CAD CABG and PCI presenting with chest pain and DELUCA  -Cardiac Enzymes Troponin T elevated peaked at 0.04 and trended down, CPK remained negative.  -EKG no acute changes  -Patient recommended for pharmacological stress test (as she has poor exercise capacity on previous exercise stress test and echo) with hx of PVD with stents. Patient adamantly refused stating she had an allergic reaction during nuclear stress test requiring reversal agent. Old records revealed lexiscan stress done in 2013 however no allergic reaction noted.  Cardiac cath discussed with patient and family and they are in agreement. Patient for cardiac cath tomorrow with Dr. Badillo. Precathed/consented. Will add to schedule. Plavix 600 mg PO x 1 to be given in AM.  -Echocardiogram 7/27 (see full report above) revealed LVEF 45-50% apical severe hypokinesis, apical septal wall severe hypokinesis. The study suggests ischemic damage to the LAD distribution. It is not different from previous study from 2014

## 2017-07-27 NOTE — H&P ADULT - HISTORY OF PRESENT ILLNESS
82 y/o F with Hx of HTN, hyperlipidemia, CAD s/p CABG in 2003 and PCIs in 2013, chronic systolic CHF, DM, asthma, PVD, who presents to ED with c/o CP tonight while trying to get up from her sofa. The pain was describes as sharp, and resolved by the time she arrived to ED. Patient does also admit to recent increase in her Lasix due to increased swelling in her legs. 80 y/o F with Hx of HTN, hyperlipidemia, CAD s/p CABG in 2003 and PCIs in 2013, chronic diastolic CHF (last EF 50% on Echo 2/17), DM, asthma, PVD, who presents to ED with c/o CP tonight while trying to get up from her sofa. The pain was describes as sharp, and resolved by the time she arrived to ED. Patient does also admit to recent increase in her Lasix due to increased swelling in her legs. 80 y/o F with Hx of HTN, hyperlipidemia, CAD s/p CABG in 2003 and PCIs in 2013, chronic diastolic CHF (last EF 50% on Echo 2/17), DM, asthma, PVD, who presents to ED with c/o CP tonight while trying to get up from her sofa. The pain was describes as sharp, 10/10 in severity and resolved by the time paramedics arrived. Patient says she had 3 episodes and they all occurred when trying to get up out the sofa, she denies any CP at rest. Patient does also admit to recent increase in her Lasix due to increased swelling in her legs, she was to have LE dopplers done but she never went. She also admits to DELUCA sometimes with 1-2 flights of stairs. She denies palpitations, diaphoresis, dizziness, LOC, orthopnea/PND, SOB at rest.

## 2017-07-27 NOTE — H&P ADULT - PROBLEM SELECTOR PLAN 3
Cont outpt meds Check amylase and lipase in AM; If patient continues to have tenderness further imaging may be warranted.

## 2017-07-27 NOTE — ED PROVIDER NOTE - MEDICAL DECISION MAKING DETAILS
cp. pt well appearing. pain free at this time. ecg no acute changes. labs noted. troponin elevated. case d/w Dr Barnard and will admit to cardiology

## 2017-07-27 NOTE — H&P ADULT - PSH
History of total knee replacement  S/P knee replacement, bilateral  Status post aorto-coronary artery bypass graft  2003

## 2017-07-27 NOTE — ED PROVIDER NOTE - PMH
Asthma  dx Jan 2013, no intubations  Atherosclerosis of coronary artery  s/p CABG 2003, s/p PCI with CHRISTI x 3 07/2013  Chronic systolic heart failure  last EF 45-50% Jan 2014  Essential hypertension  HTN (hypertension)  Glaucoma  Glaucoma  Hyperlipidemia  HLD (hyperlipidemia)  Osteoarthritis  Osteoarthritis  Osteomalacia  Vitamin D deficient osteomalacia  Peripheral vascular disease  PVD (peripheral vascular disease)

## 2017-07-27 NOTE — H&P ADULT - PROBLEM SELECTOR PLAN 2
Cont ASA/Plavix/Statin/ACE/Beta blocker Cont ASA/Statin/ACE/Beta blocker Will get US b/l LE to R/O DVT; Likely related to PVD, patient currently with no LE edema at this time. But does c/o tenderness in LLE.

## 2017-07-27 NOTE — PROGRESS NOTE ADULT - ASSESSMENT
82 y/o F with Hx of HTN, hyperlipidemia, CAD s/p CABG in 2003 (LIMA-LAD, SVG-OM1, SVG-RCA) with prior PCI most recent PTCA SVG-RCA ISR 8/2014  chronic diastolic CHF (last EF 50% on Echo 2/17), DM, asthma, PVD, who presented to [facility] ED 7/26 with c/o CP while trying to get up from her sofa as well as DELUCA. Patient admitted for further management and currently awaiting cardiac cath tomorrow to rule out progression of CAD.

## 2017-07-27 NOTE — ED PROVIDER NOTE - ATTENDING CONTRIBUTION TO CARE
81 yof pw chest pain left sided, w/o back pain or sob or abd pain.  hx of CAD, HL, HTN.      agree w/ PA, avss, nad, rrr, cta bl, however, given hx of CAD and advanced age and unclear etiology, will admit to r/o ACS, low suspicion for PE or dissection.

## 2017-07-27 NOTE — PROGRESS NOTE ADULT - PROBLEM SELECTOR PLAN 4
Cr normal.-Holding Furosemide and Lisinopril in AM in anticipation of cardiac cath.  -Continue to monitor electrolytes, BP, volume status, and urine output. Cr normal.-Holding Furosemide and Lisinopril in AM in anticipation of cardiac cath.  -Continue to monitor electrolytes, BP, volume status, and urine output.  -IVF NS 50 cc/hr to start at 6 AM on 7/28.

## 2017-07-27 NOTE — H&P ADULT - PROBLEM SELECTOR PROBLEM 4
Chronic systolic heart failure Chronic diastolic CHF (congestive heart failure) Atherosclerosis of coronary artery

## 2017-07-27 NOTE — H&P ADULT - PMH
Asthma  dx Jan 2013, no intubations  Atherosclerosis of coronary artery  s/p CABG 2003, s/p PCI with CHRISTI x 3 07/2013  Chronic systolic heart failure  last EF 45-50% Jan 2014  Essential hypertension  HTN (hypertension)  Glaucoma  Glaucoma  Hyperlipidemia  HLD (hyperlipidemia)  Osteoarthritis  Osteoarthritis  Osteomalacia  Vitamin D deficient osteomalacia  Peripheral vascular disease  PVD (peripheral vascular disease) Asthma  dx Jan 2013, no intubations  Atherosclerosis of coronary artery  s/p CABG 2003, s/p PCI with CHRISTI x 3 07/2013  Chronic diastolic CHF (congestive heart failure)  last EF 45-50% Jan 2014  Essential hypertension  HTN (hypertension)  Glaucoma  Glaucoma  Hyperlipidemia  HLD (hyperlipidemia)  Osteoarthritis  Osteoarthritis  Osteomalacia  Vitamin D deficient osteomalacia  Peripheral vascular disease  PVD (peripheral vascular disease)

## 2017-07-27 NOTE — H&P ADULT - ASSESSMENT
80 y/o F with Hx of HTN, hyperlipidemia, CAD s/p CABG in 2003 and PCIs in 2013, chronic systolic CHF, DM, asthma, PVD, who presents to ED with c/o CP tonight while trying to get up from her sofa. In ED Trop T slightly elevated at 0.04, with nml EKG and CXR was clear. Patient received ASA 325mg x 1. Patient denies any CP at this time. 80 y/o F with Hx of HTN, hyperlipidemia, CAD s/p CABG in 2003 and PCIs in 2013, chronic systolic CHF, DM, asthma, PVD, who presents to ED with c/o CP tonight while trying to get up from her sofa. Patient does also admit to recent increase in her Lasix due to increased swelling in her legs, she was to have LE dopplers done but she never went. She also admits to DELUCA sometimes with 1-2 flights of stairs. In ED Trop T slightly elevated at 0.04, with nml EKG and CXR was clear. Patient received ASA 325mg x 1. Patient denies any CP at this time. 82 y/o F with Hx of HTN, hyperlipidemia, CAD s/p CABG in 2003 and PCIs in 2013, chronic diastolic CHF (last EF 50% on Echo 2/17), DM, asthma, PVD, who presents to ED with c/o CP tonight while trying to get up from her sofa. Patient does also admit to recent increase in her Lasix due to increased swelling in her legs, she was to have LE dopplers done but she never went. She also admits to DELUCA sometimes with 1-2 flights of stairs. In ED Trop T slightly elevated at 0.04, with nml EKG and CXR was clear. Patient received ASA 325mg x 1. Patient denies any CP at this time.

## 2017-07-27 NOTE — PROGRESS NOTE ADULT - SUBJECTIVE AND OBJECTIVE BOX
Interventional Cardiology PA Adult Progress Note    Subjective Assessment: Patient seen and examined at bedside. Patient denies C/P, SOB, palpitations, dizziness, diaphoresis, N/V.   	  MEDICATIONS:  lisinopril 20 milliGRAM(s) Oral daily  metoprolol succinate ER 25 milliGRAM(s) Oral two times a day  furosemide    Tablet 20 milliGRAM(s) Oral daily  isosorbide   mononitrate ER Tablet (IMDUR) 60 milliGRAM(s) Oral daily  citalopram 10 milliGRAM(s) Oral daily  pantoprazole   Suspension 40 milliGRAM(s) Oral daily  insulin lispro (HumaLOG) corrective regimen sliding scale   SubCutaneous Before meals and at bedtime  dextrose Gel 1 Dose(s) Oral once PRN  dextrose 50% Injectable 12.5 Gram(s) IV Push once  dextrose 50% Injectable 25 Gram(s) IV Push once  dextrose 50% Injectable 25 Gram(s) IV Push once  glucagon  Injectable 1 milliGRAM(s) IntraMuscular once PRN  atorvastatin 80 milliGRAM(s) Oral at bedtime    heparin  Injectable 5000 Unit(s) SubCutaneous every 12 hours  dextrose 5%. 1000 milliLiter(s) IV Continuous <Continuous>  aspirin enteric coated 81 milliGRAM(s) Oral daily  ketorolac 0.5% Ophthalmic Solution 1 Drop(s) Both EYES two times a day  folic acid 1 milliGRAM(s) Oral daily      	    [PHYSICAL EXAM:  TELEMETRY:  T(C): 36.4 (07-27-17 @ 16:25), Max: 36.6 (07-26-17 @ 22:57)  HR: 59 (07-27-17 @ 15:45) (53 - 60)  BP: 132/64 (07-27-17 @ 15:45) (132/64 - 192/85)  RR: 16 (07-27-17 @ 15:45) (16 - 18)  SpO2: 99% (07-27-17 @ 15:45) (98% - 100%) RA   Wt(kg): --  I&O's Summary    26 Jul 2017 07:01  -  27 Jul 2017 07:00  --------------------------------------------------------  IN: 60 mL / OUT: 250 mL / NET: -190 mL    27 Jul 2017 07:01  -  27 Jul 2017 17:22  --------------------------------------------------------  IN: 0 mL / OUT: 810 mL / NET: -810 mL      Height (cm): 152.4 (07-27 @ 02:01)  Weight (kg): 55.7 (07-27 @ 02:01)  BMI (kg/m2): 24 (07-27 @ 02:01)  BSA (m2): 1.52 (07-27 @ 02:01)    [place]: No  Central/PICC/Mid Line: No                                        Appearance: Normal	  HEENT:   Normal oral mucosa, PERRL, EOMI	  Neck: Supple. No JVD.   Cardiovascular: + S1 S2. RRR. 2/6 Holosystolic Murmur.   Respiratory: CTA Bilaterally. No rhonchi, wheezes, rales. 	  Gastrointestinal: BS x 4. Soft. Non-distended. Mildly tender to palpation rLQ.  Skin: No rashes, No ecchymoses, No cyanosis  Extremities: Normal range of motion, No clubbing, cyanosis or edema BLE   Neurologic: Non-focal  Psychiatry: A & O x 3, Mood & affect appropriate      	    ECG:  	  RADIOLOGY:   DIAGNOSTIC TESTING:  [ ] Echocardiogram:< from: Echocardiogram (07.27.17 @ 14:38) >  The left atrium is dilated.The mitral inflow pattern is consistent with   impaired left ventricular relaxation with mildly elevated left atrial   pressure(8-14mmHg).  Right atrial size is normal. The right ventricle is normal   in size and function. There is moderate aortic valve thickening. No aortic   regurgitation noted. No hemodynamically significant valvular aortic   stenosis. Structurally normal mitral valve. No mitral regurgitation noted.  Structurally normal tricuspid valve. There is mild tricuspid regurgitation. There is no   echocardiographic evidence for pulmonary hypertension. Structurally normal   pulmonic valve. There is mild pulmonic regurgitation. No aortic root   dilatation. There is no pericardial effusion.  Left Ventricle  Normal left ventricular size and wall thickness.  There is apical severe hypokinesis.  There is apical septal wall severe hypokinesis.  Tyhe study suggests ischemic damage to the LAD distribution. It is not  different from previous study from 2014  The left ventricular ejection fraction is estimated to be 45-50%    < end of copied text >    [ ]  Catheterization:  [ ] Stress Test:    [ ] ROBINA  OTHER: 	    LABS:	 	  CARDIAC MARKERS:                         11.0   6.3   )-----------( 193      ( 27 Jul 2017 07:38 )             32.7     07-27    138  |  104  |  19  ----------------------------<  91  3.9   |  24  |  1.00    Ca    9.0      27 Jul 2017 07:38  Mg     1.9     07-27    TPro  7.5  /  Alb  4.0  /  TBili  0.2  /  DBili  x   /  AST  19  /  ALT  11  /  AlkPhos  73  07-26    proBNP:   Lipid Profile:   HgA1c: Hemoglobin A1C, Whole Blood: 5.7 % (07-27 @ 07:38)    TSH:   PT/INR - ( 26 Jul 2017 22:17 )   PT: 11.2 sec;   INR: 1.01          PTT - ( 26 Jul 2017 22:17 )  PTT:36.4 sec

## 2017-07-27 NOTE — ED PROVIDER NOTE - OBJECTIVE STATEMENT
80 y/o female with hx of asthma, HLD, HTN, CAD,  c/o cp. pt states pain began tonight while getting up from sofa. Sharp pain left chest and resolved upon arrival. no sob, cough, back pain, abd pain, n/v. no ha or dizziness. pt notes swelling to b/l LE and recently had her lasix increased. no further complaints

## 2017-07-28 LAB
ANION GAP SERPL CALC-SCNC: 10 MMOL/L — SIGNIFICANT CHANGE UP (ref 5–17)
BUN SERPL-MCNC: 26 MG/DL — HIGH (ref 7–23)
CALCIUM SERPL-MCNC: 9.8 MG/DL — SIGNIFICANT CHANGE UP (ref 8.4–10.5)
CHLORIDE SERPL-SCNC: 103 MMOL/L — SIGNIFICANT CHANGE UP (ref 96–108)
CO2 SERPL-SCNC: 25 MMOL/L — SIGNIFICANT CHANGE UP (ref 22–31)
CREAT SERPL-MCNC: 1 MG/DL — SIGNIFICANT CHANGE UP (ref 0.5–1.3)
GLUCOSE SERPL-MCNC: 96 MG/DL — SIGNIFICANT CHANGE UP (ref 70–99)
HCT VFR BLD CALC: 36.2 % — SIGNIFICANT CHANGE UP (ref 34.5–45)
HGB BLD-MCNC: 11.8 G/DL — SIGNIFICANT CHANGE UP (ref 11.5–15.5)
MAGNESIUM SERPL-MCNC: 2.1 MG/DL — SIGNIFICANT CHANGE UP (ref 1.6–2.6)
MCHC RBC-ENTMCNC: 28 PG — SIGNIFICANT CHANGE UP (ref 27–34)
MCHC RBC-ENTMCNC: 32.6 G/DL — SIGNIFICANT CHANGE UP (ref 32–36)
MCV RBC AUTO: 86 FL — SIGNIFICANT CHANGE UP (ref 80–100)
PLATELET # BLD AUTO: 193 K/UL — SIGNIFICANT CHANGE UP (ref 150–400)
POTASSIUM SERPL-MCNC: 4.2 MMOL/L — SIGNIFICANT CHANGE UP (ref 3.5–5.3)
POTASSIUM SERPL-SCNC: 4.2 MMOL/L — SIGNIFICANT CHANGE UP (ref 3.5–5.3)
RBC # BLD: 4.21 M/UL — SIGNIFICANT CHANGE UP (ref 3.8–5.2)
RBC # FLD: 13.1 % — SIGNIFICANT CHANGE UP (ref 10.3–16.9)
SODIUM SERPL-SCNC: 138 MMOL/L — SIGNIFICANT CHANGE UP (ref 135–145)
WBC # BLD: 6.8 K/UL — SIGNIFICANT CHANGE UP (ref 3.8–10.5)
WBC # FLD AUTO: 6.8 K/UL — SIGNIFICANT CHANGE UP (ref 3.8–10.5)

## 2017-07-28 PROCEDURE — 92928 PRQ TCAT PLMT NTRAC ST 1 LES: CPT | Mod: LD

## 2017-07-28 PROCEDURE — 93459 L HRT ART/GRFT ANGIO: CPT | Mod: 26,XU

## 2017-07-28 RX ORDER — LOPERAMIDE HCL 2 MG
2 TABLET ORAL ONCE
Qty: 0 | Refills: 0 | Status: COMPLETED | OUTPATIENT
Start: 2017-07-28 | End: 2017-07-28

## 2017-07-28 RX ORDER — CLOPIDOGREL BISULFATE 75 MG/1
75 TABLET, FILM COATED ORAL DAILY
Qty: 0 | Refills: 0 | Status: DISCONTINUED | OUTPATIENT
Start: 2017-07-29 | End: 2017-07-29

## 2017-07-28 RX ORDER — SIMETHICONE 80 MG/1
80 TABLET, CHEWABLE ORAL ONCE
Qty: 0 | Refills: 0 | Status: COMPLETED | OUTPATIENT
Start: 2017-07-28 | End: 2017-07-28

## 2017-07-28 RX ORDER — SODIUM CHLORIDE 9 MG/ML
500 INJECTION INTRAMUSCULAR; INTRAVENOUS; SUBCUTANEOUS
Qty: 0 | Refills: 0 | Status: DISCONTINUED | OUTPATIENT
Start: 2017-07-28 | End: 2017-07-29

## 2017-07-28 RX ADMIN — Medication 20 MILLIGRAM(S): at 06:50

## 2017-07-28 RX ADMIN — SODIUM CHLORIDE 50 MILLILITER(S): 9 INJECTION INTRAMUSCULAR; INTRAVENOUS; SUBCUTANEOUS at 06:53

## 2017-07-28 RX ADMIN — SODIUM CHLORIDE 50 MILLILITER(S): 9 INJECTION INTRAMUSCULAR; INTRAVENOUS; SUBCUTANEOUS at 09:24

## 2017-07-28 RX ADMIN — ATORVASTATIN CALCIUM 80 MILLIGRAM(S): 80 TABLET, FILM COATED ORAL at 21:28

## 2017-07-28 RX ADMIN — Medication 650 MILLIGRAM(S): at 06:50

## 2017-07-28 RX ADMIN — CLOPIDOGREL BISULFATE 600 MILLIGRAM(S): 75 TABLET, FILM COATED ORAL at 09:24

## 2017-07-28 RX ADMIN — Medication 25 MILLIGRAM(S): at 06:50

## 2017-07-28 RX ADMIN — Medication 2 MILLIGRAM(S): at 02:34

## 2017-07-28 RX ADMIN — Medication 25 MILLIGRAM(S): at 20:01

## 2017-07-28 RX ADMIN — Medication 1 DROP(S): at 06:50

## 2017-07-28 RX ADMIN — PANTOPRAZOLE SODIUM 40 MILLIGRAM(S): 20 TABLET, DELAYED RELEASE ORAL at 06:50

## 2017-07-28 RX ADMIN — Medication 1 MILLIGRAM(S): at 11:40

## 2017-07-28 RX ADMIN — Medication 1: at 21:28

## 2017-07-28 RX ADMIN — Medication 325 MILLIGRAM(S): at 09:24

## 2017-07-28 RX ADMIN — SIMETHICONE 80 MILLIGRAM(S): 80 TABLET, CHEWABLE ORAL at 17:09

## 2017-07-28 RX ADMIN — Medication 650 MILLIGRAM(S): at 07:00

## 2017-07-28 RX ADMIN — ISOSORBIDE MONONITRATE 60 MILLIGRAM(S): 60 TABLET, EXTENDED RELEASE ORAL at 11:40

## 2017-07-28 NOTE — PROGRESS NOTE ADULT - PROBLEM SELECTOR PLAN 1
Patient with history of CAD CABG and PCI presenting with chest pain and DELUCA  -Cardiac Enzymes Troponin T elevated peaked at 0.04 and trended down, CPK remained negative.  -EKG no acute changes  -Patient recommended for pharmacological stress test (as she has poor exercise capacity on previous exercise stress test and echo) with hx of PVD with stents. Patient adamantly refused stating she had an allergic reaction during nuclear stress test requiring reversal agent. Old records revealed lexiscan stress done in 2013 however no allergic reaction noted.  Cardiac cath discussed with patient and family and they are in agreement. Patient for cardiac cath tomorrow with Dr. Badillo. Precathed/consented. Added to schedule. Plavix 600 mg PO x1 and ASA  mg PO x 1 given this AM.   -Echocardiogram 7/27 (see full report above) revealed LVEF 45-50% apical severe hypokinesis, apical septal wall severe hypokinesis. The study suggests ischemic damage to the LAD distribution. It is not different from previous study from 2014 Patient with history of CAD CABG and PCI presenting with chest pain and DELUCA  -Cardiac Enzymes Troponin T elevated peaked at 0.04 and trended down, CPK remained negative.  -EKG no acute changes  -Patient recommended for pharmacological stress test (as she has poor exercise capacity on previous exercise stress test and echo) with hx of PVD with stents. Patient adamantly refused stating she had an allergic reaction during nuclear stress test requiring reversal agent. Old records revealed lexiscan stress done in 2013 however no allergic reaction noted.  Cardiac cath discussed with patient and family and they are in agreement. Patient for cardiac cath with Dr. Badillo. Precathed/consented. Added to schedule. Plavix 600 mg PO x1 and ASA  mg PO x 1 given this AM.   -Echocardiogram 7/27 (see full report above) revealed LVEF 45-50% apical severe hypokinesis, apical septal wall severe hypokinesis. The study suggests ischemic damage to the LAD distribution. It is not different from previous study from 2014

## 2017-07-28 NOTE — PROGRESS NOTE ADULT - SUBJECTIVE AND OBJECTIVE BOX
Interventional Cardiology PA Adult Progress Note    Subjective Assessment: Patient seen and examined at bedside. Patient reports feeling well and has no complaints.   	  MEDICATIONS:  metoprolol succinate ER 25 milliGRAM(s) Oral two times a day  furosemide   Tablet 20 milliGRAM(s) Oral daily  isosorbide   mononitrate ER Tablet (IMDUR) 60 milliGRAM(s) Oral daily  citalopram 10 milliGRAM(s) Oral daily  pantoprazole  Tablet 40 milliGRAM(s) Oral before breakfast    insulin lispro (HumaLOG) corrective regimen sliding scale   SubCutaneous Before meals and at bedtime  dextrose Gel 1 Dose(s) Oral once PRN  dextrose 50% Injectable 12.5 Gram(s) IV Push once  dextrose 50% Injectable 25 Gram(s) IV Push once  dextrose 50% Injectable 25 Gram(s) IV Push once  glucagon  Injectable 1 milliGRAM(s) IntraMuscular once PRN  atorvastatin 80 milliGRAM(s) Oral at bedtime    heparin  Injectable 5000 Unit(s) SubCutaneous every 12 hours  dextrose 5%. 1000 milliLiter(s) IV Continuous <Continuous>  ketorolac 0.5% Ophthalmic Solution 1 Drop(s) Both EYES two times a day  folic acid 1 milliGRAM(s) Oral daily  sodium chloride 0.9%. 1000 milliLiter(s) IV Continuous <Continuous>      	    [PHYSICAL EXAM:  TELEMETRY: SB  T(C): 35.7 (07-28-17 @ 13:52), Max: 36.4 (07-27-17 @ 16:25)  HR: 61 (07-28-17 @ 11:45) (57 - 72)  BP: 155/72 (07-28-17 @ 11:45) (106/58 - 155/72)  RR: 16 (07-28-17 @ 11:45) (16 - 16)  SpO2: 99% (07-28-17 @ 11:45) (96% - 100%) RA   Wt(kg): --  I&O's Summary    27 Jul 2017 07:01  -  28 Jul 2017 07:00  --------------------------------------------------------  IN: 360 mL / OUT: 810 mL / NET: -450 mL    28 Jul 2017 07:01  -  28 Jul 2017 15:51  --------------------------------------------------------  IN: 90 mL / OUT: 600 mL / NET: -510 mL        C[place]: No  Central/PICC/Mid Line:       No                                  Appearance: Normal	  HEENT:   Normal oral mucosa, PERRL, EOMI	  Neck: Supple. No JVD.   Cardiovascular: + S1 S2. RRR. 2/6 Holosystolic Murmur.   Respiratory: CTA Bilaterally. No rhonchi, wheezes, rales  Gastrointestinal:  BS x 4. Soft NT/ND   Extremities: Normal range of motion, No clubbing, cyanosis or edema BLE.   Neurologic: Non-focal  Psychiatry: A & O x 3, Mood & affect appropriate      	    ECG:  	  RADIOLOGY:   DIAGNOSTIC TESTING:  [ ] Echocardiogram:  [ ]  Catheterization:  [ ] Stress Test:    [ ] ROBINA  OTHER: 	    LABS:	 	  CARDIAC MARKERS:                          11.8   6.8   )-----------( 193      ( 28 Jul 2017 06:00 )             36.2     07-28    138  |  103  |  26<H>  ----------------------------<  96  4.2   |  25  |  1.00    Ca    9.8      28 Jul 2017 06:01  Mg     2.1     07-28    TPro  7.5  /  Alb  4.0  /  TBili  0.2  /  DBili  x   /  AST  19  /  ALT  11  /  AlkPhos  73  07-26    proBNP:   Lipid Profile:   HgA1c:   TSH:   PT/INR - ( 26 Jul 2017 22:17 )   PT: 11.2 sec;   INR: 1.01          PTT - ( 26 Jul 2017 22:17 )  PTT:36.4 sec

## 2017-07-28 NOTE — PROGRESS NOTE ADULT - PROBLEM SELECTOR PLAN 3
-Patient appears euvolemic.  -Continue Strict Is and Os and Daily Weights.  -Lisinopril held in anticipation of cardiac cath.
-Patient appears euvolemic.  -Continue Strict Is and Os and Daily Weights.  -Holding Furosemide and Lisinopril in AM in anticipation of cardiac cath.

## 2017-07-28 NOTE — PROGRESS NOTE ADULT - ASSESSMENT
80 y/o F with Hx of HTN, hyperlipidemia, CAD s/p CABG in 2003 (LIMA-LAD, SVG-OM1, SVG-RCA) with prior PCI most recent PTCA SVG-RCA ISR 8/2014  chronic diastolic CHF (last EF 50% on Echo 2/17), DM, asthma, PVD, who presented to [facility] ED 7/26 with c/o CP while trying to get up from her sofa as well as DELUCA. Patient admitted for further management and currently awaiting cardiac cath tomorrow to rule out progression of CAD. 80 y/o F with Hx of HTN, hyperlipidemia, CAD s/p CABG in 2003 (LIMA-LAD, SVG-OM1, SVG-RCA) with prior PCI most recent PTCA SVG-RCA ISR 8/2014  chronic diastolic CHF (last EF 50% on Echo 2/17), DM, asthma, PVD, who presented to [facility] ED 7/26 with c/o CP while trying to get up from her sofa as well as DELUCA. Patient admitted for further management and currently awaiting cardiac cath to rule out progression of CAD.

## 2017-07-28 NOTE — PROGRESS NOTE ADULT - PROBLEM SELECTOR PLAN 4
Cr normal.Lisinopril held in anticipation of cardiac cath.   -Continue to monitor electrolytes, BP, volume status, and urine output.  -IVF NS 50 cc/hr started at 6 AM.

## 2017-07-28 NOTE — PROGRESS NOTE ADULT - PROBLEM SELECTOR PLAN 2
Patient with abdominal pain on admission now resolved. LFTs normal. Amylase Lipase normal. Will continue to monitor. No further imaging at this time.   Continue Protonix.
Patient with abdominal pain on admission. LFTs normal. Amylase Lipase normal. Will continue to monitor. No further imaging at this time.   Continue Protonix.

## 2017-07-28 NOTE — PROGRESS NOTE ADULT - PROBLEM SELECTOR PLAN 5
Improved after PO supplementation. Continue to monitor
K +3.9 Kdur 20 mEq PO x 1 given. Follow-up AM BMP.

## 2017-07-28 NOTE — PROGRESS NOTE ADULT - PROBLEM SELECTOR PLAN 6
Improved after PO supplementation. Continue to monitor    DVT Prophylaxis: Heparin SUB Q  Dispo: Patient currently stable awaiting cardiac cath today. Follow-up results and recommendations. Probable discharge home tomorrow if no events.
Magnesium 1.9-Mag Ox 800 mg PO x 1 given. Follow-up AM Magnesium    DVT Prophylaxis: Heparin SUB Q  Dispo: Patient currently stable awaiting cardiac cath tomorrow. Follow-up results and recommendations.

## 2017-07-29 ENCOUNTER — TRANSCRIPTION ENCOUNTER (OUTPATIENT)
Age: 82
End: 2017-07-29

## 2017-07-29 VITALS — WEIGHT: 116.84 LBS

## 2017-07-29 LAB
ANION GAP SERPL CALC-SCNC: 12 MMOL/L — SIGNIFICANT CHANGE UP (ref 5–17)
BUN SERPL-MCNC: 17 MG/DL — SIGNIFICANT CHANGE UP (ref 7–23)
CALCIUM SERPL-MCNC: 9.4 MG/DL — SIGNIFICANT CHANGE UP (ref 8.4–10.5)
CHLORIDE SERPL-SCNC: 103 MMOL/L — SIGNIFICANT CHANGE UP (ref 96–108)
CO2 SERPL-SCNC: 23 MMOL/L — SIGNIFICANT CHANGE UP (ref 22–31)
CREAT SERPL-MCNC: 0.9 MG/DL — SIGNIFICANT CHANGE UP (ref 0.5–1.3)
GLUCOSE SERPL-MCNC: 93 MG/DL — SIGNIFICANT CHANGE UP (ref 70–99)
HCT VFR BLD CALC: 35 % — SIGNIFICANT CHANGE UP (ref 34.5–45)
HGB BLD-MCNC: 11.7 G/DL — SIGNIFICANT CHANGE UP (ref 11.5–15.5)
MAGNESIUM SERPL-MCNC: 2.1 MG/DL — SIGNIFICANT CHANGE UP (ref 1.6–2.6)
MCHC RBC-ENTMCNC: 28.6 PG — SIGNIFICANT CHANGE UP (ref 27–34)
MCHC RBC-ENTMCNC: 33.4 G/DL — SIGNIFICANT CHANGE UP (ref 32–36)
MCV RBC AUTO: 85.6 FL — SIGNIFICANT CHANGE UP (ref 80–100)
PLATELET # BLD AUTO: 189 K/UL — SIGNIFICANT CHANGE UP (ref 150–400)
POTASSIUM SERPL-MCNC: 4 MMOL/L — SIGNIFICANT CHANGE UP (ref 3.5–5.3)
POTASSIUM SERPL-SCNC: 4 MMOL/L — SIGNIFICANT CHANGE UP (ref 3.5–5.3)
RBC # BLD: 4.09 M/UL — SIGNIFICANT CHANGE UP (ref 3.8–5.2)
RBC # FLD: 13.1 % — SIGNIFICANT CHANGE UP (ref 10.3–16.9)
SODIUM SERPL-SCNC: 138 MMOL/L — SIGNIFICANT CHANGE UP (ref 135–145)
WBC # BLD: 6.5 K/UL — SIGNIFICANT CHANGE UP (ref 3.8–10.5)
WBC # FLD AUTO: 6.5 K/UL — SIGNIFICANT CHANGE UP (ref 3.8–10.5)

## 2017-07-29 PROCEDURE — 93010 ELECTROCARDIOGRAM REPORT: CPT

## 2017-07-29 PROCEDURE — 99239 HOSP IP/OBS DSCHRG MGMT >30: CPT

## 2017-07-29 RX ORDER — METHYLPREDNISOLONE 4 MG
1 TABLET ORAL
Qty: 0 | Refills: 0 | COMMUNITY

## 2017-07-29 RX ORDER — LISINOPRIL 2.5 MG/1
5 TABLET ORAL DAILY
Qty: 0 | Refills: 0 | Status: DISCONTINUED | OUTPATIENT
Start: 2017-07-29 | End: 2017-07-29

## 2017-07-29 RX ORDER — CLOPIDOGREL BISULFATE 75 MG/1
1 TABLET, FILM COATED ORAL
Qty: 0 | Refills: 0 | COMMUNITY

## 2017-07-29 RX ORDER — FOLIC ACID 0.8 MG
1 TABLET ORAL
Qty: 0 | Refills: 0 | COMMUNITY

## 2017-07-29 RX ORDER — GLUCOSAMINE HCL/CHONDROITIN SU 500-400 MG
1 CAPSULE ORAL
Qty: 0 | Refills: 0 | COMMUNITY

## 2017-07-29 RX ORDER — ACETAMINOPHEN 500 MG
650 TABLET ORAL EVERY 6 HOURS
Qty: 0 | Refills: 0 | Status: DISCONTINUED | OUTPATIENT
Start: 2017-07-29 | End: 2017-07-29

## 2017-07-29 RX ORDER — ASPIRIN/CALCIUM CARB/MAGNESIUM 324 MG
1 TABLET ORAL
Qty: 0 | Refills: 0 | COMMUNITY

## 2017-07-29 RX ORDER — ATORVASTATIN CALCIUM 80 MG/1
1 TABLET, FILM COATED ORAL
Qty: 0 | Refills: 0 | COMMUNITY

## 2017-07-29 RX ORDER — KETOROLAC TROMETHAMINE 0.5 %
1 DROPS OPHTHALMIC (EYE)
Qty: 0 | Refills: 0 | COMMUNITY

## 2017-07-29 RX ORDER — PANTOPRAZOLE SODIUM 20 MG/1
40 TABLET, DELAYED RELEASE ORAL
Qty: 0 | Refills: 0 | COMMUNITY

## 2017-07-29 RX ORDER — LOTEPREDNOL ETABONATE 2 MG/ML
1 SUSPENSION/ DROPS OPHTHALMIC
Qty: 0 | Refills: 0 | COMMUNITY

## 2017-07-29 RX ADMIN — HEPARIN SODIUM 5000 UNIT(S): 5000 INJECTION INTRAVENOUS; SUBCUTANEOUS at 06:40

## 2017-07-29 RX ADMIN — Medication 25 MILLIGRAM(S): at 06:41

## 2017-07-29 RX ADMIN — Medication 650 MILLIGRAM(S): at 01:30

## 2017-07-29 RX ADMIN — Medication 81 MILLIGRAM(S): at 12:54

## 2017-07-29 RX ADMIN — ISOSORBIDE MONONITRATE 60 MILLIGRAM(S): 60 TABLET, EXTENDED RELEASE ORAL at 12:54

## 2017-07-29 RX ADMIN — Medication 20 MILLIGRAM(S): at 06:41

## 2017-07-29 RX ADMIN — PANTOPRAZOLE SODIUM 40 MILLIGRAM(S): 20 TABLET, DELAYED RELEASE ORAL at 06:41

## 2017-07-29 RX ADMIN — Medication 1 MILLIGRAM(S): at 12:54

## 2017-07-29 RX ADMIN — CITALOPRAM 10 MILLIGRAM(S): 10 TABLET, FILM COATED ORAL at 12:54

## 2017-07-29 RX ADMIN — Medication 650 MILLIGRAM(S): at 02:21

## 2017-07-29 RX ADMIN — Medication 1 DROP(S): at 06:41

## 2017-07-29 RX ADMIN — CLOPIDOGREL BISULFATE 75 MILLIGRAM(S): 75 TABLET, FILM COATED ORAL at 12:54

## 2017-07-29 NOTE — DISCHARGE NOTE ADULT - PATIENT PORTAL LINK FT
“You can access the [facility] Patient Portal, offered by [facility], by registering with the following website: [email or web address]”

## 2017-07-29 NOTE — DISCHARGE NOTE ADULT - CARE PLAN
Principal Discharge DX:	Coronary artery disease  Goal:	Continue Aspirin and Plavix.  Instructions for follow-up, activity and diet:	You had cardiac cath and stent to the LAD artery.   You need to continue Aspirin and Plavix to keep the stents open.   Follow up with Dr. Billings your cardiologist in 1-2 weeks  Wound care: Avoid strenuous activities such as lifting, running, pushing or sex for 1 week in order to avoid bleeding complications in the groin. Call our doctor  if any questions about the wound -- such as bleeding, swelling, increasing pain or redness.  If any questions about the groin, call us at [phone]. Ok to shower tonight. Avoid bathing for 1 week  Secondary Diagnosis:	Essential hypertension  Instructions for follow-up, activity and diet:	Continue home medications  Low salt diet  Secondary Diagnosis:	Diabetes  Instructions for follow-up, activity and diet:	You said that you were off Metformin for some time now. Please follow up with your PCP outside to determine if you were to be off it completely. If you were to continue it, please restart only on Monday. Principal Discharge DX:	Coronary artery disease  Goal:	Continue Aspirin and Plavix.  Instructions for follow-up, activity and diet:	You had cardiac cath and stent to the LAD artery.   You need to continue Aspirin and Plavix to keep the stents open.   Follow up with Dr. Billings your cardiologist in 1-2 weeks  Wound care: Avoid strenuous activities such as lifting, running, pushing or sex for 1 week in order to avoid bleeding complications in the groin. Call our doctor  if any questions about the wound -- such as bleeding, swelling, increasing pain or redness.  If any questions about the groin, call us at [phone]. Ok to shower tonight. Avoid bathing for 1 week  Secondary Diagnosis:	Essential hypertension  Instructions for follow-up, activity and diet:	Continue home medications  Low salt diet  Secondary Diagnosis:	Diabetes  Instructions for follow-up, activity and diet:	You said that you were off Metformin for some time now. Please follow up with your PCP outside to determine if you were to be off it completely. If you were to continue it, please restart only on Monday.

## 2017-07-29 NOTE — DISCHARGE NOTE ADULT - PLAN OF CARE
Continue Aspirin and Plavix. You had cardiac cath and stent to the LAD artery.   You need to continue Aspirin and Plavix to keep the stents open.   Follow up with Dr. Billings your cardiologist in 1-2 weeks  Wound care: Avoid strenuous activities such as lifting, running, pushing or sex for 1 week in order to avoid bleeding complications in the groin. Call our doctor  if any questions about the wound -- such as bleeding, swelling, increasing pain or redness.  If any questions about the groin, call us at [phone]. Ok to shower tonight. Avoid bathing for 1 week Continue home medications  Low salt diet You said that you were off Metformin for some time now. Please follow up with your PCP outside to determine if you were to be off it completely. If you were to continue it, please restart only on Monday.

## 2017-07-29 NOTE — DISCHARGE NOTE ADULT - MEDICATION SUMMARY - MEDICATIONS TO TAKE
I will START or STAY ON the medications listed below when I get home from the hospital:    Ecotrin Adult Low Strength 81 mg oral delayed release tablet  -- 1 tab(s) by mouth once a day  -- Indication: For Coronary artery disease / stent     quinapril 20 mg oral tablet  -- 1 tab(s) by mouth once a day  -- Indication: For Hypertension     citalopram 10 mg oral tablet  -- 1 tab(s) by mouth once a day  -- Indication: For mood     metFORMIN 500 mg oral tablet, extended release  -- 1 tab(s) by mouth once a day  -- Indication: For diabetes    Lipitor 80 mg oral tablet  -- 1 tab(s) by mouth once a day (at bedtime)  -- Indication: For Cholesterol lowering    Plavix 75 mg oral tablet  -- 1 tab(s) by mouth once a day  -- Indication: For Coronary artery disease / stent     metoprolol succinate 25 mg oral tablet, extended release  -- 1 tab(s) by mouth 2 times a day  -- Indication: For Hypertension     furosemide 20 mg oral tablet  -- 1 tab(s) by mouth once a day  -- Indication: For Hypertension     magnesium gluconate 250 mg oral tablet  -- 1 tab(s) by mouth once a day  -- Indication: For supplement     isosorbide  -- 60 milligram(s)  once a day  -- Indication: For Hypertension     Chondroitin-Glucosamine 600 mg-750 mg oral tablet  -- 1 tab(s) by mouth 2 times a day  -- Indication: For Arthritis supplement    ketorolac 0.5% ophthalmic solution  -- 1 drop(s) to each affected eye 2 times a day  -- Indication: For Eye pain    Lotemax 0.5% ophthalmic ointment  -- 1 application to each affected eye 2 times a day  -- Indication: For Eye pain     Protonix  -- 40  by mouth once a day  -- Indication: For Stomach acid protection     folic acid 1 mg oral tablet  -- 1 tab(s) by mouth once a day  -- Indication: For Supplement

## 2017-07-29 NOTE — DISCHARGE NOTE ADULT - HOSPITAL COURSE
82 y/o F with Hx of HTN, hyperlipidemia, CAD s/p CABG in 2003 and PCIs in 2013, chronic diastolic CHF (last EF 50% on Echo 2/17), DM, asthma, PVD, who presents to ED with c/o CP tonight while trying to get up from her sofa. Troponin here 0.03.  Echo 7/27 showed EF 45-50% with wall motion abnormality. She underwent cardiac cath on 7/28 that showed pLAD 80-90% s/p CHRISTI. LIMA-LAD akinetic. SVG-OM1 patent. SVG-RPDA 100%. mRAC 100% with R-L collaterals.  No event over night post procedure. No chest pain complaints. Vital stables. Right groin access wound is stable without hematoma. She is stable for discharge today.

## 2017-07-29 NOTE — DISCHARGE NOTE ADULT - CARE PROVIDER_API CALL
Rosa Billings (DO), Cardiovascular Disease  [address] [address]  Phone: [phone]  Fax: [phone]

## 2017-08-01 PROCEDURE — 93306 TTE W/DOPPLER COMPLETE: CPT

## 2017-08-01 PROCEDURE — C1889: CPT

## 2017-08-01 PROCEDURE — 36415 COLL VENOUS BLD VENIPUNCTURE: CPT

## 2017-08-01 PROCEDURE — 84484 ASSAY OF TROPONIN QUANT: CPT

## 2017-08-01 PROCEDURE — C1760: CPT

## 2017-08-01 PROCEDURE — 80053 COMPREHEN METABOLIC PANEL: CPT

## 2017-08-01 PROCEDURE — 85730 THROMBOPLASTIN TIME PARTIAL: CPT

## 2017-08-01 PROCEDURE — 80048 BASIC METABOLIC PNL TOTAL CA: CPT

## 2017-08-01 PROCEDURE — 83735 ASSAY OF MAGNESIUM: CPT

## 2017-08-01 PROCEDURE — C1874: CPT

## 2017-08-01 PROCEDURE — 82550 ASSAY OF CK (CPK): CPT

## 2017-08-01 PROCEDURE — C1894: CPT

## 2017-08-01 PROCEDURE — 93005 ELECTROCARDIOGRAM TRACING: CPT

## 2017-08-01 PROCEDURE — 85610 PROTHROMBIN TIME: CPT

## 2017-08-01 PROCEDURE — C1725: CPT

## 2017-08-01 PROCEDURE — C1887: CPT

## 2017-08-01 PROCEDURE — 83036 HEMOGLOBIN GLYCOSYLATED A1C: CPT

## 2017-08-01 PROCEDURE — 82150 ASSAY OF AMYLASE: CPT

## 2017-08-01 PROCEDURE — 99285 EMERGENCY DEPT VISIT HI MDM: CPT | Mod: 25

## 2017-08-01 PROCEDURE — C1769: CPT

## 2017-08-01 PROCEDURE — 71045 X-RAY EXAM CHEST 1 VIEW: CPT

## 2017-08-01 PROCEDURE — 83690 ASSAY OF LIPASE: CPT

## 2017-08-01 PROCEDURE — 85025 COMPLETE CBC W/AUTO DIFF WBC: CPT

## 2017-08-01 PROCEDURE — 85027 COMPLETE CBC AUTOMATED: CPT

## 2017-08-01 PROCEDURE — 82553 CREATINE MB FRACTION: CPT

## 2017-08-03 ENCOUNTER — CLINICAL ADVICE (OUTPATIENT)
Age: 82
End: 2017-08-03

## 2017-08-03 ENCOUNTER — RX RENEWAL (OUTPATIENT)
Age: 82
End: 2017-08-03

## 2017-08-03 DIAGNOSIS — H40.9 UNSPECIFIED GLAUCOMA: ICD-10-CM

## 2017-08-03 DIAGNOSIS — R10.9 UNSPECIFIED ABDOMINAL PAIN: ICD-10-CM

## 2017-08-03 DIAGNOSIS — E78.5 HYPERLIPIDEMIA, UNSPECIFIED: ICD-10-CM

## 2017-08-03 DIAGNOSIS — Z95.1 PRESENCE OF AORTOCORONARY BYPASS GRAFT: ICD-10-CM

## 2017-08-03 DIAGNOSIS — E83.31 FAMILIAL HYPOPHOSPHATEMIA: ICD-10-CM

## 2017-08-03 DIAGNOSIS — Z95.5 PRESENCE OF CORONARY ANGIOPLASTY IMPLANT AND GRAFT: ICD-10-CM

## 2017-08-03 DIAGNOSIS — I13.0 HYPERTENSIVE HEART AND CHRONIC KIDNEY DISEASE WITH HEART FAILURE AND STAGE 1 THROUGH STAGE 4 CHRONIC KIDNEY DISEASE, OR UNSPECIFIED CHRONIC KIDNEY DISEASE: ICD-10-CM

## 2017-08-03 DIAGNOSIS — Z96.653 PRESENCE OF ARTIFICIAL KNEE JOINT, BILATERAL: ICD-10-CM

## 2017-08-03 DIAGNOSIS — N18.2 CHRONIC KIDNEY DISEASE, STAGE 2 (MILD): ICD-10-CM

## 2017-08-03 DIAGNOSIS — I25.119 ATHEROSCLEROTIC HEART DISEASE OF NATIVE CORONARY ARTERY WITH UNSPECIFIED ANGINA PECTORIS: ICD-10-CM

## 2017-08-03 DIAGNOSIS — E11.51 TYPE 2 DIABETES MELLITUS WITH DIABETIC PERIPHERAL ANGIOPATHY WITHOUT GANGRENE: ICD-10-CM

## 2017-08-03 DIAGNOSIS — R07.9 CHEST PAIN, UNSPECIFIED: ICD-10-CM

## 2017-08-03 DIAGNOSIS — Z88.8 ALLERGY STATUS TO OTHER DRUGS, MEDICAMENTS AND BIOLOGICAL SUBSTANCES STATUS: ICD-10-CM

## 2017-08-03 DIAGNOSIS — E11.22 TYPE 2 DIABETES MELLITUS WITH DIABETIC CHRONIC KIDNEY DISEASE: ICD-10-CM

## 2017-08-03 DIAGNOSIS — R10.812 LEFT UPPER QUADRANT ABDOMINAL TENDERNESS: ICD-10-CM

## 2017-08-03 DIAGNOSIS — E83.42 HYPOMAGNESEMIA: ICD-10-CM

## 2017-08-03 DIAGNOSIS — R60.0 LOCALIZED EDEMA: ICD-10-CM

## 2017-08-03 DIAGNOSIS — I50.42 CHRONIC COMBINED SYSTOLIC (CONGESTIVE) AND DIASTOLIC (CONGESTIVE) HEART FAILURE: ICD-10-CM

## 2017-08-03 DIAGNOSIS — E87.6 HYPOKALEMIA: ICD-10-CM

## 2017-08-03 DIAGNOSIS — J45.909 UNSPECIFIED ASTHMA, UNCOMPLICATED: ICD-10-CM

## 2017-08-03 DIAGNOSIS — M90.80 OSTEOPATHY IN DISEASES CLASSIFIED ELSEWHERE, UNSPECIFIED SITE: ICD-10-CM

## 2017-08-03 DIAGNOSIS — M19.90 UNSPECIFIED OSTEOARTHRITIS, UNSPECIFIED SITE: ICD-10-CM

## 2017-08-03 DIAGNOSIS — Z88.1 ALLERGY STATUS TO OTHER ANTIBIOTIC AGENTS STATUS: ICD-10-CM

## 2017-08-04 DIAGNOSIS — I25.110 ATHEROSCLEROTIC HEART DISEASE OF NATIVE CORONARY ARTERY WITH UNSTABLE ANGINA PECTORIS: ICD-10-CM

## 2017-08-08 ENCOUNTER — APPOINTMENT (OUTPATIENT)
Dept: HEART AND VASCULAR | Facility: CLINIC | Age: 82
End: 2017-08-08
Payer: MEDICARE

## 2017-08-08 VITALS — HEIGHT: 62 IN | BODY MASS INDEX: 21.16 KG/M2 | WEIGHT: 115 LBS | HEART RATE: 62 BPM

## 2017-08-08 VITALS — SYSTOLIC BLOOD PRESSURE: 140 MMHG | HEART RATE: 59 BPM | DIASTOLIC BLOOD PRESSURE: 60 MMHG

## 2017-08-08 DIAGNOSIS — E83.42 HYPOMAGNESEMIA: ICD-10-CM

## 2017-08-08 PROCEDURE — 36415 COLL VENOUS BLD VENIPUNCTURE: CPT

## 2017-08-08 PROCEDURE — 99214 OFFICE O/P EST MOD 30 MIN: CPT | Mod: 25

## 2017-08-08 PROCEDURE — 93000 ELECTROCARDIOGRAM COMPLETE: CPT

## 2017-08-10 ENCOUNTER — RESULT REVIEW (OUTPATIENT)
Age: 82
End: 2017-08-10

## 2017-08-10 LAB
25(OH)D3 SERPL-MCNC: 33.6 NG/ML
ALBUMIN SERPL ELPH-MCNC: 4.4 G/DL
ALP BLD-CCNC: 77 U/L
ALT SERPL-CCNC: 14 U/L
ANION GAP SERPL CALC-SCNC: 16 MMOL/L
AST SERPL-CCNC: 19 U/L
BASOPHILS # BLD AUTO: 0.02 K/UL
BASOPHILS NFR BLD AUTO: 0.3 %
BILIRUB SERPL-MCNC: 0.4 MG/DL
BUN SERPL-MCNC: 25 MG/DL
CALCIUM SERPL-MCNC: 10.3 MG/DL
CHLORIDE SERPL-SCNC: 97 MMOL/L
CHOLEST SERPL-MCNC: 241 MG/DL
CHOLEST/HDLC SERPL: 3.7 RATIO
CO2 SERPL-SCNC: 25 MMOL/L
CREAT SERPL-MCNC: 1.34 MG/DL
CRP SERPL HS-MCNC: 0.8 MG/L
EOSINOPHIL # BLD AUTO: 0.15 K/UL
EOSINOPHIL NFR BLD AUTO: 2.2 %
FOLATE SERPL-MCNC: >20 NG/ML
GLUCOSE SERPL-MCNC: 95 MG/DL
HBA1C MFR BLD HPLC: 6 %
HCT VFR BLD CALC: 34.8 %
HDLC SERPL-MCNC: 65 MG/DL
HGB BLD-MCNC: 11.2 G/DL
IMM GRANULOCYTES NFR BLD AUTO: 0.1 %
LDLC SERPL CALC-MCNC: 154 MG/DL
LYMPHOCYTES # BLD AUTO: 1.81 K/UL
LYMPHOCYTES NFR BLD AUTO: 26.5 %
MAN DIFF?: NORMAL
MCHC RBC-ENTMCNC: 28.3 PG
MCHC RBC-ENTMCNC: 32.2 GM/DL
MCV RBC AUTO: 87.9 FL
MONOCYTES # BLD AUTO: 0.34 K/UL
MONOCYTES NFR BLD AUTO: 5 %
NEUTROPHILS # BLD AUTO: 4.51 K/UL
NEUTROPHILS NFR BLD AUTO: 65.9 %
NT-PROBNP SERPL-MCNC: 274 PG/ML
PLATELET # BLD AUTO: 265 K/UL
POTASSIUM SERPL-SCNC: 4.6 MMOL/L
PROT SERPL-MCNC: 7.6 G/DL
RBC # BLD: 3.96 M/UL
RBC # FLD: 13.8 %
SODIUM SERPL-SCNC: 138 MMOL/L
T3FREE SERPL-MCNC: 3.33 PG/ML
T4 FREE SERPL-MCNC: 1.4 NG/DL
TRIGL SERPL-MCNC: 109 MG/DL
TSH SERPL-ACNC: 1.04 UIU/ML
VIT B12 SERPL-MCNC: 916 PG/ML
WBC # FLD AUTO: 6.84 K/UL

## 2017-08-11 LAB
ALBUMIN SERPL ELPH-MCNC: 4.1 G/DL
ALP BLD-CCNC: 69 U/L
ALT SERPL-CCNC: 12 U/L
ANION GAP SERPL CALC-SCNC: 12 MMOL/L
APPEARANCE: CLEAR
AST SERPL-CCNC: 18 U/L
BACTERIA: NEGATIVE
BASOPHILS # BLD AUTO: 0.03 K/UL
BASOPHILS NFR BLD AUTO: 0.4 %
BILIRUB SERPL-MCNC: 0.4 MG/DL
BILIRUBIN URINE: NEGATIVE
BLOOD URINE: NEGATIVE
BUN SERPL-MCNC: 27 MG/DL
CALCIUM SERPL-MCNC: 10.1 MG/DL
CHLORIDE SERPL-SCNC: 98 MMOL/L
CHOLEST SERPL-MCNC: 207 MG/DL
CHOLEST/HDLC SERPL: 3.5 RATIO
CO2 SERPL-SCNC: 26 MMOL/L
COLOR: YELLOW
CREAT SERPL-MCNC: 1.44 MG/DL
CREAT SPEC-SCNC: 59 MG/DL
CREAT/PROT UR: 0.1 RATIO
EOSINOPHIL # BLD AUTO: 0.15 K/UL
EOSINOPHIL NFR BLD AUTO: 2 %
GLUCOSE QUALITATIVE U: NORMAL MG/DL
GLUCOSE SERPL-MCNC: 73 MG/DL
HCT VFR BLD CALC: 34.9 %
HDLC SERPL-MCNC: 60 MG/DL
HGB BLD-MCNC: 11.4 G/DL
IMM GRANULOCYTES NFR BLD AUTO: 0.1 %
KETONES URINE: NEGATIVE
LDLC SERPL CALC-MCNC: 129 MG/DL
LEUKOCYTE ESTERASE URINE: NEGATIVE
LYMPHOCYTES # BLD AUTO: 2.58 K/UL
LYMPHOCYTES NFR BLD AUTO: 34.1 %
MAN DIFF?: NORMAL
MCHC RBC-ENTMCNC: 28.6 PG
MCHC RBC-ENTMCNC: 32.7 GM/DL
MCV RBC AUTO: 87.7 FL
MICROSCOPIC-UA: NORMAL
MONOCYTES # BLD AUTO: 0.53 K/UL
MONOCYTES NFR BLD AUTO: 7 %
NEUTROPHILS # BLD AUTO: 4.27 K/UL
NEUTROPHILS NFR BLD AUTO: 56.4 %
NITRITE URINE: NEGATIVE
PH URINE: 6
PHOSPHATE SERPL-MCNC: 2.7 MG/DL
PLATELET # BLD AUTO: 262 K/UL
POTASSIUM SERPL-SCNC: 4.6 MMOL/L
PROT SERPL-MCNC: 7.2 G/DL
PROT UR-MCNC: 8 MG/DL
PROTEIN URINE: NEGATIVE MG/DL
RBC # BLD: 3.98 M/UL
RBC # FLD: 13.8 %
RED BLOOD CELLS URINE: 2 /HPF
SODIUM SERPL-SCNC: 136 MMOL/L
SPECIFIC GRAVITY URINE: 1.01
SQUAMOUS EPITHELIAL CELLS: 0 /HPF
TRIGL SERPL-MCNC: 92 MG/DL
URATE SERPL-MCNC: 7.3 MG/DL
UROBILINOGEN URINE: NORMAL MG/DL
WBC # FLD AUTO: 7.57 K/UL
WHITE BLOOD CELLS URINE: 1 /HPF

## 2017-08-13 ENCOUNTER — FORM ENCOUNTER (OUTPATIENT)
Age: 82
End: 2017-08-13

## 2017-08-14 ENCOUNTER — OUTPATIENT (OUTPATIENT)
Dept: OUTPATIENT SERVICES | Facility: HOSPITAL | Age: 82
LOS: 1 days | End: 2017-08-14
Payer: MEDICARE

## 2017-08-14 LAB — MAGNESIUM RBC-MCNC: 4 MG/DL

## 2017-08-14 PROCEDURE — 93970 EXTREMITY STUDY: CPT | Mod: 26

## 2017-08-14 PROCEDURE — 93970 EXTREMITY STUDY: CPT

## 2017-08-15 ENCOUNTER — APPOINTMENT (OUTPATIENT)
Dept: HEART AND VASCULAR | Facility: CLINIC | Age: 82
End: 2017-08-15

## 2017-08-18 ENCOUNTER — APPOINTMENT (OUTPATIENT)
Dept: OPHTHALMOLOGY | Facility: CLINIC | Age: 82
End: 2017-08-18
Payer: MEDICARE

## 2017-08-18 DIAGNOSIS — H43.391 OTHER VITREOUS OPACITIES, RIGHT EYE: ICD-10-CM

## 2017-08-18 PROCEDURE — 92083 EXTENDED VISUAL FIELD XM: CPT

## 2017-08-18 PROCEDURE — 92133 CPTRZD OPH DX IMG PST SGM ON: CPT

## 2017-08-18 PROCEDURE — 92225: CPT | Mod: RT

## 2017-08-18 PROCEDURE — 92012 INTRM OPH EXAM EST PATIENT: CPT

## 2017-08-21 ENCOUNTER — APPOINTMENT (OUTPATIENT)
Dept: OPHTHALMOLOGY | Facility: CLINIC | Age: 82
End: 2017-08-21
Payer: MEDICARE

## 2017-08-22 ENCOUNTER — APPOINTMENT (OUTPATIENT)
Dept: ENDOCRINOLOGY | Facility: CLINIC | Age: 82
End: 2017-08-22
Payer: MEDICARE

## 2017-08-22 VITALS
BODY MASS INDEX: 20.85 KG/M2 | WEIGHT: 114 LBS | HEART RATE: 55 BPM | DIASTOLIC BLOOD PRESSURE: 73 MMHG | SYSTOLIC BLOOD PRESSURE: 119 MMHG

## 2017-08-22 PROCEDURE — 97802 MEDICAL NUTRITION INDIV IN: CPT

## 2017-08-22 PROCEDURE — 99213 OFFICE O/P EST LOW 20 MIN: CPT

## 2017-08-24 ENCOUNTER — APPOINTMENT (OUTPATIENT)
Dept: NEPHROLOGY | Facility: CLINIC | Age: 82
End: 2017-08-24
Payer: MEDICARE

## 2017-08-24 VITALS
WEIGHT: 117 LBS | BODY MASS INDEX: 21.4 KG/M2 | DIASTOLIC BLOOD PRESSURE: 50 MMHG | TEMPERATURE: 98 F | SYSTOLIC BLOOD PRESSURE: 110 MMHG | HEART RATE: 80 BPM

## 2017-08-24 PROCEDURE — 99214 OFFICE O/P EST MOD 30 MIN: CPT

## 2017-08-31 ENCOUNTER — APPOINTMENT (OUTPATIENT)
Dept: NEUROLOGY | Facility: CLINIC | Age: 82
End: 2017-08-31
Payer: MEDICARE

## 2017-08-31 VITALS
SYSTOLIC BLOOD PRESSURE: 165 MMHG | DIASTOLIC BLOOD PRESSURE: 78 MMHG | HEIGHT: 62 IN | HEART RATE: 60 BPM | OXYGEN SATURATION: 96 %

## 2017-08-31 PROCEDURE — 99204 OFFICE O/P NEW MOD 45 MIN: CPT

## 2017-09-01 ENCOUNTER — MESSAGE (OUTPATIENT)
Age: 82
End: 2017-09-01

## 2017-09-01 ENCOUNTER — EMERGENCY (EMERGENCY)
Facility: HOSPITAL | Age: 82
LOS: 1 days | Discharge: HOME | End: 2017-09-01
Attending: EMERGENCY MEDICINE | Admitting: EMERGENCY MEDICINE
Payer: MEDICARE

## 2017-09-01 VITALS
HEART RATE: 59 BPM | SYSTOLIC BLOOD PRESSURE: 139 MMHG | TEMPERATURE: 98 F | RESPIRATION RATE: 18 BRPM | DIASTOLIC BLOOD PRESSURE: 65 MMHG | OXYGEN SATURATION: 98 %

## 2017-09-01 VITALS
HEART RATE: 53 BPM | TEMPERATURE: 98 F | RESPIRATION RATE: 18 BRPM | DIASTOLIC BLOOD PRESSURE: 72 MMHG | OXYGEN SATURATION: 98 % | SYSTOLIC BLOOD PRESSURE: 129 MMHG | WEIGHT: 113.98 LBS

## 2017-09-01 DIAGNOSIS — Z88.8 ALLERGY STATUS TO OTHER DRUGS, MEDICAMENTS AND BIOLOGICAL SUBSTANCES: ICD-10-CM

## 2017-09-01 DIAGNOSIS — I10 ESSENTIAL (PRIMARY) HYPERTENSION: ICD-10-CM

## 2017-09-01 DIAGNOSIS — R07.89 OTHER CHEST PAIN: ICD-10-CM

## 2017-09-01 DIAGNOSIS — Z88.1 ALLERGY STATUS TO OTHER ANTIBIOTIC AGENTS STATUS: ICD-10-CM

## 2017-09-01 DIAGNOSIS — E78.5 HYPERLIPIDEMIA, UNSPECIFIED: ICD-10-CM

## 2017-09-01 DIAGNOSIS — J45.909 UNSPECIFIED ASTHMA, UNCOMPLICATED: ICD-10-CM

## 2017-09-01 DIAGNOSIS — I25.10 ATHEROSCLEROTIC HEART DISEASE OF NATIVE CORONARY ARTERY WITHOUT ANGINA PECTORIS: ICD-10-CM

## 2017-09-01 DIAGNOSIS — Z79.899 OTHER LONG TERM (CURRENT) DRUG THERAPY: ICD-10-CM

## 2017-09-01 DIAGNOSIS — Z95.5 PRESENCE OF CORONARY ANGIOPLASTY IMPLANT AND GRAFT: Chronic | ICD-10-CM

## 2017-09-01 LAB
ALBUMIN SERPL ELPH-MCNC: 3.6 G/DL — SIGNIFICANT CHANGE UP (ref 3.3–5)
ALP SERPL-CCNC: 70 U/L — SIGNIFICANT CHANGE UP (ref 40–120)
ALT FLD-CCNC: 11 U/L — SIGNIFICANT CHANGE UP (ref 10–45)
ANION GAP SERPL CALC-SCNC: 13 MMOL/L — SIGNIFICANT CHANGE UP (ref 5–17)
APTT BLD: 34.1 SEC — SIGNIFICANT CHANGE UP (ref 27.5–37.4)
AST SERPL-CCNC: 16 U/L — SIGNIFICANT CHANGE UP (ref 10–40)
BASOPHILS NFR BLD AUTO: 0.5 % — SIGNIFICANT CHANGE UP (ref 0–2)
BILIRUB SERPL-MCNC: 0.2 MG/DL — SIGNIFICANT CHANGE UP (ref 0.2–1.2)
BUN SERPL-MCNC: 27 MG/DL — HIGH (ref 7–23)
CALCIUM SERPL-MCNC: 9.7 MG/DL — SIGNIFICANT CHANGE UP (ref 8.4–10.5)
CHLORIDE SERPL-SCNC: 100 MMOL/L — SIGNIFICANT CHANGE UP (ref 96–108)
CK MB CFR SERPL CALC: 5.3 NG/ML — SIGNIFICANT CHANGE UP (ref 0–6.7)
CK MB CFR SERPL CALC: 5.6 NG/ML — SIGNIFICANT CHANGE UP (ref 0–6.7)
CK SERPL-CCNC: 127 U/L — SIGNIFICANT CHANGE UP (ref 25–170)
CO2 SERPL-SCNC: 23 MMOL/L — SIGNIFICANT CHANGE UP (ref 22–31)
CREAT SERPL-MCNC: 1 MG/DL — SIGNIFICANT CHANGE UP (ref 0.5–1.3)
EOSINOPHIL NFR BLD AUTO: 4.7 % — SIGNIFICANT CHANGE UP (ref 0–6)
GLUCOSE SERPL-MCNC: 100 MG/DL — HIGH (ref 70–99)
HCT VFR BLD CALC: 31.4 % — LOW (ref 34.5–45)
HGB BLD-MCNC: 10.4 G/DL — LOW (ref 11.5–15.5)
INR BLD: 1.04 — SIGNIFICANT CHANGE UP (ref 0.88–1.16)
LYMPHOCYTES # BLD AUTO: 38.8 % — SIGNIFICANT CHANGE UP (ref 13–44)
MCHC RBC-ENTMCNC: 29.1 PG — SIGNIFICANT CHANGE UP (ref 27–34)
MCHC RBC-ENTMCNC: 33.1 G/DL — SIGNIFICANT CHANGE UP (ref 32–36)
MCV RBC AUTO: 87.7 FL — SIGNIFICANT CHANGE UP (ref 80–100)
MONOCYTES NFR BLD AUTO: 9.6 % — SIGNIFICANT CHANGE UP (ref 2–14)
NEUTROPHILS NFR BLD AUTO: 46.4 % — SIGNIFICANT CHANGE UP (ref 43–77)
NT-PROBNP SERPL-SCNC: 665 PG/ML — HIGH (ref 0–300)
PLATELET # BLD AUTO: 170 K/UL — SIGNIFICANT CHANGE UP (ref 150–400)
POTASSIUM SERPL-MCNC: 3.9 MMOL/L — SIGNIFICANT CHANGE UP (ref 3.5–5.3)
POTASSIUM SERPL-SCNC: 3.9 MMOL/L — SIGNIFICANT CHANGE UP (ref 3.5–5.3)
PROT SERPL-MCNC: 7 G/DL — SIGNIFICANT CHANGE UP (ref 6–8.3)
PROTHROM AB SERPL-ACNC: 11.6 SEC — SIGNIFICANT CHANGE UP (ref 9.8–12.7)
RBC # BLD: 3.58 M/UL — LOW (ref 3.8–5.2)
RBC # FLD: 13.5 % — SIGNIFICANT CHANGE UP (ref 10.3–16.9)
SODIUM SERPL-SCNC: 136 MMOL/L — SIGNIFICANT CHANGE UP (ref 135–145)
TROPONIN T SERPL-MCNC: 0.04 NG/ML — HIGH (ref 0–0.01)
TROPONIN T SERPL-MCNC: 0.04 NG/ML — HIGH (ref 0–0.01)
WBC # BLD: 7.8 K/UL — SIGNIFICANT CHANGE UP (ref 3.8–10.5)
WBC # FLD AUTO: 7.8 K/UL — SIGNIFICANT CHANGE UP (ref 3.8–10.5)

## 2017-09-01 PROCEDURE — 83880 ASSAY OF NATRIURETIC PEPTIDE: CPT

## 2017-09-01 PROCEDURE — 84484 ASSAY OF TROPONIN QUANT: CPT

## 2017-09-01 PROCEDURE — 93005 ELECTROCARDIOGRAM TRACING: CPT

## 2017-09-01 PROCEDURE — 71045 X-RAY EXAM CHEST 1 VIEW: CPT

## 2017-09-01 PROCEDURE — 71010: CPT | Mod: 26

## 2017-09-01 PROCEDURE — 85025 COMPLETE CBC W/AUTO DIFF WBC: CPT

## 2017-09-01 PROCEDURE — 80053 COMPREHEN METABOLIC PANEL: CPT

## 2017-09-01 PROCEDURE — 93010 ELECTROCARDIOGRAM REPORT: CPT

## 2017-09-01 PROCEDURE — 82550 ASSAY OF CK (CPK): CPT

## 2017-09-01 PROCEDURE — 82553 CREATINE MB FRACTION: CPT

## 2017-09-01 PROCEDURE — 99285 EMERGENCY DEPT VISIT HI MDM: CPT | Mod: 25

## 2017-09-01 PROCEDURE — 85610 PROTHROMBIN TIME: CPT

## 2017-09-01 PROCEDURE — 36415 COLL VENOUS BLD VENIPUNCTURE: CPT

## 2017-09-01 PROCEDURE — 85730 THROMBOPLASTIN TIME PARTIAL: CPT

## 2017-09-01 PROCEDURE — 99283 EMERGENCY DEPT VISIT LOW MDM: CPT | Mod: 25

## 2017-09-01 RX ORDER — ACETAMINOPHEN 500 MG
650 TABLET ORAL ONCE
Qty: 0 | Refills: 0 | Status: DISCONTINUED | OUTPATIENT
Start: 2017-09-01 | End: 2017-09-05

## 2017-09-01 NOTE — ED PROVIDER NOTE - OBJECTIVE STATEMENT
82F hx htn, cad, s/p cabg, s/p recent cath (7/27) with stent placement and balloon angio, high chol, c/o few seconds of L sided stabbing chest pain. pt states pain was at rest.  no SOB, no n/v/d. no dizziness. no abd pain.  took aspirin and was given more aspirin in the ambulance.  pt states pain has since resolved. no radiation of pain.

## 2017-09-01 NOTE — ED ADULT NURSE NOTE - CHPI ED SYMPTOMS POS
CHEST PAIN/tonight .Pt denies shortness of breath ,no diaphoresis, no cough, no fever./CHEST DISCOMFORT

## 2017-09-01 NOTE — ED PROVIDER NOTE - PSH
H/O heart artery stent    History of total knee replacement  S/P knee replacement, bilateral  Status post aorto-coronary artery bypass graft  2003

## 2017-09-01 NOTE — ED PROVIDER NOTE - MEDICAL DECISION MAKING DETAILS
brief episode of stabbing L sided chest pain, recent stent placement, compliant with meds, pain free currently  -check labs, ekg, cxr

## 2017-09-01 NOTE — ED PROVIDER NOTE - PROGRESS NOTE DETAILS
no recurrence of chest pain while in ED. troponin unchanged from prior results, EKG unchanged.  recent stenting, doubt reocclusion, second trop pending recommend cardiology f/u. c/o minor headache to top of head, requesting tylenol  I have discussed the discharge plan with the patient. The patient agrees with the plan, as discussed.  The patient understands Emergency Department diagnosis is a preliminary diagnosis often based on limited information and that the patient must adhere to the follow-up plan as discussed.  The patient understands that if the symptoms worsen the patient may return to the Emergency Department at any time for further evaluation and treatment.

## 2017-09-01 NOTE — ED PROVIDER NOTE - PMH
Asthma  dx Jan 2013, no intubations  Atherosclerosis of coronary artery  s/p CABG 2003, s/p PCI with CHRISTI x 3 07/2013  Chronic diastolic CHF (congestive heart failure)  last EF 45-50% Jan 2014  Essential hypertension  HTN (hypertension)  Glaucoma  Glaucoma  Hyperlipidemia  HLD (hyperlipidemia)  Osteoarthritis  Osteoarthritis  Osteomalacia  Vitamin D deficient osteomalacia  Peripheral vascular disease  PVD (peripheral vascular disease)

## 2017-09-01 NOTE — ED ADULT TRIAGE NOTE - ARRIVAL INFO ADDITIONAL COMMENTS
pt c/o left sided chest pain started at rest, right before she was about to go to bed. pt took a total of 3 baby aspirin's. denies sob, dizziness, diaphoresis. recently here for cardiac stent placement. pt c/o left sided chest pain started at rest, started before she was about to go to bed. pt took a total of 3 baby aspirin's. denies sob, dizziness, diaphoresis. recently here for cardiac stent placement.

## 2017-09-19 LAB
ALBUMIN SERPL ELPH-MCNC: 4.1 G/DL
ALP BLD-CCNC: 72 U/L
ALT SERPL-CCNC: 11 U/L
ANION GAP SERPL CALC-SCNC: 14 MMOL/L
AST SERPL-CCNC: 19 U/L
BASOPHILS # BLD AUTO: 0.03 K/UL
BASOPHILS NFR BLD AUTO: 0.4 %
BILIRUB SERPL-MCNC: 0.2 MG/DL
BUN SERPL-MCNC: 19 MG/DL
CALCIUM SERPL-MCNC: 10.1 MG/DL
CALCIUM SERPL-MCNC: 10.1 MG/DL
CHLORIDE SERPL-SCNC: 102 MMOL/L
CHOLEST SERPL-MCNC: 228 MG/DL
CHOLEST/HDLC SERPL: 3.6 RATIO
CO2 SERPL-SCNC: 26 MMOL/L
CREAT SERPL-MCNC: 1.09 MG/DL
EOSINOPHIL # BLD AUTO: 0.21 K/UL
EOSINOPHIL NFR BLD AUTO: 2.9 %
GLUCOSE SERPL-MCNC: 92 MG/DL
HCT VFR BLD CALC: 35 %
HDLC SERPL-MCNC: 64 MG/DL
HGB BLD-MCNC: 11 G/DL
IMM GRANULOCYTES NFR BLD AUTO: 0 %
LDLC SERPL CALC-MCNC: 152 MG/DL
LYMPHOCYTES # BLD AUTO: 2.32 K/UL
LYMPHOCYTES NFR BLD AUTO: 32.2 %
MAN DIFF?: NORMAL
MCHC RBC-ENTMCNC: 28.6 PG
MCHC RBC-ENTMCNC: 31.4 GM/DL
MCV RBC AUTO: 90.9 FL
MONOCYTES # BLD AUTO: 0.61 K/UL
MONOCYTES NFR BLD AUTO: 8.5 %
NEUTROPHILS # BLD AUTO: 4.04 K/UL
NEUTROPHILS NFR BLD AUTO: 56 %
PARATHYROID HORMONE INTACT: 81 PG/ML
PHOSPHATE SERPL-MCNC: 3.4 MG/DL
PLATELET # BLD AUTO: 241 K/UL
POTASSIUM SERPL-SCNC: 5 MMOL/L
PROT SERPL-MCNC: 7.5 G/DL
RBC # BLD: 3.85 M/UL
RBC # FLD: 14.3 %
SODIUM SERPL-SCNC: 142 MMOL/L
TRIGL SERPL-MCNC: 62 MG/DL
URATE SERPL-MCNC: 6.1 MG/DL
WBC # FLD AUTO: 7.21 K/UL

## 2017-09-20 ENCOUNTER — APPOINTMENT (OUTPATIENT)
Dept: HEART AND VASCULAR | Facility: CLINIC | Age: 82
End: 2017-09-20
Payer: MEDICARE

## 2017-09-20 VITALS — HEART RATE: 58 BPM | BODY MASS INDEX: 20.98 KG/M2 | HEIGHT: 62 IN | WEIGHT: 114 LBS

## 2017-09-20 VITALS — SYSTOLIC BLOOD PRESSURE: 118 MMHG | HEART RATE: 58 BPM | DIASTOLIC BLOOD PRESSURE: 78 MMHG

## 2017-09-20 DIAGNOSIS — R42 DIZZINESS AND GIDDINESS: ICD-10-CM

## 2017-09-20 PROCEDURE — 36415 COLL VENOUS BLD VENIPUNCTURE: CPT

## 2017-09-20 PROCEDURE — 99214 OFFICE O/P EST MOD 30 MIN: CPT | Mod: 25

## 2017-09-20 PROCEDURE — 93000 ELECTROCARDIOGRAM COMPLETE: CPT

## 2017-09-26 ENCOUNTER — OUTPATIENT (OUTPATIENT)
Dept: OUTPATIENT SERVICES | Facility: HOSPITAL | Age: 82
LOS: 1 days | End: 2017-09-26
Payer: MEDICARE

## 2017-09-26 DIAGNOSIS — Z95.5 PRESENCE OF CORONARY ANGIOPLASTY IMPLANT AND GRAFT: Chronic | ICD-10-CM

## 2017-09-26 PROCEDURE — 72141 MRI NECK SPINE W/O DYE: CPT

## 2017-09-26 PROCEDURE — 72141 MRI NECK SPINE W/O DYE: CPT | Mod: 26

## 2017-10-09 ENCOUNTER — RESULT REVIEW (OUTPATIENT)
Age: 82
End: 2017-10-09

## 2017-10-11 ENCOUNTER — EMERGENCY (EMERGENCY)
Facility: HOSPITAL | Age: 82
LOS: 1 days | Discharge: HOME | End: 2017-10-11
Attending: EMERGENCY MEDICINE | Admitting: EMERGENCY MEDICINE
Payer: MEDICARE

## 2017-10-11 VITALS
RESPIRATION RATE: 16 BRPM | DIASTOLIC BLOOD PRESSURE: 84 MMHG | SYSTOLIC BLOOD PRESSURE: 139 MMHG | HEART RATE: 75 BPM | OXYGEN SATURATION: 98 %

## 2017-10-11 VITALS
OXYGEN SATURATION: 97 % | WEIGHT: 110.01 LBS | SYSTOLIC BLOOD PRESSURE: 137 MMHG | HEART RATE: 65 BPM | TEMPERATURE: 98 F | RESPIRATION RATE: 16 BRPM | DIASTOLIC BLOOD PRESSURE: 66 MMHG

## 2017-10-11 DIAGNOSIS — I25.10 ATHEROSCLEROTIC HEART DISEASE OF NATIVE CORONARY ARTERY WITHOUT ANGINA PECTORIS: ICD-10-CM

## 2017-10-11 DIAGNOSIS — Z95.5 PRESENCE OF CORONARY ANGIOPLASTY IMPLANT AND GRAFT: Chronic | ICD-10-CM

## 2017-10-11 DIAGNOSIS — R42 DIZZINESS AND GIDDINESS: ICD-10-CM

## 2017-10-11 DIAGNOSIS — E11.9 TYPE 2 DIABETES MELLITUS WITHOUT COMPLICATIONS: ICD-10-CM

## 2017-10-11 DIAGNOSIS — Z79.899 OTHER LONG TERM (CURRENT) DRUG THERAPY: ICD-10-CM

## 2017-10-11 DIAGNOSIS — Z88.1 ALLERGY STATUS TO OTHER ANTIBIOTIC AGENTS STATUS: ICD-10-CM

## 2017-10-11 DIAGNOSIS — E78.5 HYPERLIPIDEMIA, UNSPECIFIED: ICD-10-CM

## 2017-10-11 DIAGNOSIS — J45.909 UNSPECIFIED ASTHMA, UNCOMPLICATED: ICD-10-CM

## 2017-10-11 DIAGNOSIS — I10 ESSENTIAL (PRIMARY) HYPERTENSION: ICD-10-CM

## 2017-10-11 DIAGNOSIS — Z88.8 ALLERGY STATUS TO OTHER DRUGS, MEDICAMENTS AND BIOLOGICAL SUBSTANCES: ICD-10-CM

## 2017-10-11 LAB
ALBUMIN SERPL ELPH-MCNC: 4.3 G/DL — SIGNIFICANT CHANGE UP (ref 3.3–5)
ALP SERPL-CCNC: 76 U/L — SIGNIFICANT CHANGE UP (ref 40–120)
ALT FLD-CCNC: 13 U/L — SIGNIFICANT CHANGE UP (ref 10–45)
ANION GAP SERPL CALC-SCNC: 12 MMOL/L — SIGNIFICANT CHANGE UP (ref 5–17)
AST SERPL-CCNC: 22 U/L — SIGNIFICANT CHANGE UP (ref 10–40)
BASOPHILS NFR BLD AUTO: 0.5 % — SIGNIFICANT CHANGE UP (ref 0–2)
BILIRUB SERPL-MCNC: 0.3 MG/DL — SIGNIFICANT CHANGE UP (ref 0.2–1.2)
BUN SERPL-MCNC: 21 MG/DL — SIGNIFICANT CHANGE UP (ref 7–23)
CALCIUM SERPL-MCNC: 10.2 MG/DL — SIGNIFICANT CHANGE UP (ref 8.4–10.5)
CHLORIDE SERPL-SCNC: 100 MMOL/L — SIGNIFICANT CHANGE UP (ref 96–108)
CK MB CFR SERPL CALC: 6.4 NG/ML — SIGNIFICANT CHANGE UP (ref 0–6.7)
CO2 SERPL-SCNC: 26 MMOL/L — SIGNIFICANT CHANGE UP (ref 22–31)
CREAT SERPL-MCNC: 1.05 MG/DL — SIGNIFICANT CHANGE UP (ref 0.5–1.3)
EOSINOPHIL NFR BLD AUTO: 2.8 % — SIGNIFICANT CHANGE UP (ref 0–6)
GLUCOSE SERPL-MCNC: 96 MG/DL — SIGNIFICANT CHANGE UP (ref 70–99)
HCT VFR BLD CALC: 32.4 % — LOW (ref 34.5–45)
HGB BLD-MCNC: 11.2 G/DL — LOW (ref 11.5–15.5)
LYMPHOCYTES # BLD AUTO: 29.9 % — SIGNIFICANT CHANGE UP (ref 13–44)
MCHC RBC-ENTMCNC: 29.6 PG — SIGNIFICANT CHANGE UP (ref 27–34)
MCHC RBC-ENTMCNC: 34.6 G/DL — SIGNIFICANT CHANGE UP (ref 32–36)
MCV RBC AUTO: 85.7 FL — SIGNIFICANT CHANGE UP (ref 80–100)
MONOCYTES NFR BLD AUTO: 6.2 % — SIGNIFICANT CHANGE UP (ref 2–14)
NEUTROPHILS NFR BLD AUTO: 60.6 % — SIGNIFICANT CHANGE UP (ref 43–77)
PLATELET # BLD AUTO: 242 K/UL — SIGNIFICANT CHANGE UP (ref 150–400)
POTASSIUM SERPL-MCNC: 3.9 MMOL/L — SIGNIFICANT CHANGE UP (ref 3.5–5.3)
POTASSIUM SERPL-SCNC: 3.9 MMOL/L — SIGNIFICANT CHANGE UP (ref 3.5–5.3)
PROT SERPL-MCNC: 7.8 G/DL — SIGNIFICANT CHANGE UP (ref 6–8.3)
RBC # BLD: 3.78 M/UL — LOW (ref 3.8–5.2)
RBC # FLD: 13.7 % — SIGNIFICANT CHANGE UP (ref 10.3–16.9)
SODIUM SERPL-SCNC: 138 MMOL/L — SIGNIFICANT CHANGE UP (ref 135–145)
TROPONIN T SERPL-MCNC: 0.05 NG/ML — CRITICAL HIGH (ref 0–0.01)
WBC # BLD: 7.9 K/UL — SIGNIFICANT CHANGE UP (ref 3.8–10.5)
WBC # FLD AUTO: 7.9 K/UL — SIGNIFICANT CHANGE UP (ref 3.8–10.5)

## 2017-10-11 PROCEDURE — 99284 EMERGENCY DEPT VISIT MOD MDM: CPT | Mod: 25

## 2017-10-11 PROCEDURE — 85025 COMPLETE CBC W/AUTO DIFF WBC: CPT

## 2017-10-11 PROCEDURE — 80053 COMPREHEN METABOLIC PANEL: CPT

## 2017-10-11 PROCEDURE — 84484 ASSAY OF TROPONIN QUANT: CPT

## 2017-10-11 PROCEDURE — 36415 COLL VENOUS BLD VENIPUNCTURE: CPT

## 2017-10-11 PROCEDURE — 70450 CT HEAD/BRAIN W/O DYE: CPT | Mod: 26

## 2017-10-11 PROCEDURE — 70450 CT HEAD/BRAIN W/O DYE: CPT

## 2017-10-11 PROCEDURE — 82550 ASSAY OF CK (CPK): CPT

## 2017-10-11 PROCEDURE — 93005 ELECTROCARDIOGRAM TRACING: CPT

## 2017-10-11 PROCEDURE — 82553 CREATINE MB FRACTION: CPT

## 2017-10-11 PROCEDURE — 93010 ELECTROCARDIOGRAM REPORT: CPT

## 2017-10-11 NOTE — ED PROVIDER NOTE - MEDICAL DECISION MAKING DETAILS
82F with above PMHx who p/w episode of dizziness last night which has since resolved. Pt's description of vision closing in "seeing black from both eyes" is likely related to presyncope as sx improved with sitting. Pt currently has not sx and a nonfocal neuro exam. She is ambulatory without assistance. VSS. CT head not emergent findings. Labs noted and trop slightly elevated but has been so in the past. Pt with no CP and EKG unchanged from previous. She has been compliant with her aspirin and plavix. I d/w with her cardiologist, Dr. Billings, who feels her sx of dizziness are not cardiac related and sent her to ED to r/o acute neurologic issues. Pt well-appearing and wishes to go home. She was advised of results and to follow up with her doctors - she has multiple appointments scheduled for next week. She was advised to return to the ER for any concerning or worsening symptoms.

## 2017-10-11 NOTE — ED PROVIDER NOTE - OBJECTIVE STATEMENT
82 y/o F with Hx of HTN, hyperlipidemia, CAD s/p CABG in 2003 and PCIs in 2013, chronic diastolic CHF (last EF 50% on Echo 2/17), DM, asthma, PVD, who presents to ED with c/o CP tonight while trying to get up from her sofa. Troponin here 0.03.  Echo 7/27 showed EF 45-50% with wall motion abnormality. She underwent cardiac cath on 7/28 that showed pLAD 80-90% s/p CHRISTI. LIMA-LAD akinetic. SVG-OM1 patent. SVG-RPDA 100%. mRAC 100% with R-L collaterals. 82 y/o F with Hx of HTN, hyperlipidemia, CAD s/p CABG in 2003 and PCIs in 2013, chronic diastolic CHF (last EF 45-50% on Echo 7/17), DM, asthma, PVD, who presents to ED with episode of dizziness last night while she was having dinner with her friend. She got up to prepare a dinner plate when she felt dizzy like the room was moving and then she felt her vision close in on her, stating she couldn't see out of both eyes for several seconds. She sat down and sx gradually got better. No LOC, no CP/SOB, no n/v, no n/t/w in extremities. No HA or eye pain currently. Pt called her doctors and was told to come to the er for eval. 82 y/o F with Hx of HTN, hyperlipidemia, CAD s/p CABG in 2003 and PCIs, recent PCi with stent place, on asa and plavis, chronic diastolic CHF (last EF 45-50% on Echo 7/17), DM, asthma, PVD, who presents to ED with episode of dizziness last night while she was having dinner with her friend. She got up to prepare a dinner plate when she felt dizzy like the room was moving and then she felt her vision close in on her, stating she couldn't see out of both eyes for several seconds. She sat down and sx gradually got better. No LOC, no CP/SOB, no n/v, no n/t/w in extremities. No HA or eye pain currently. Pt called her doctors and was told to come to the er for eval.

## 2017-10-11 NOTE — ED ADULT TRIAGE NOTE - CHIEF COMPLAINT QUOTE
had an episode yesterday - felt like was "falling" and lost vision for about 10 minutes- self resolved- talked with MD and sent here for evaluation - no symptoms at this time

## 2017-10-11 NOTE — ED ADULT NURSE NOTE - OBJECTIVE STATEMENT
83 y/o F with Hx of HTN, hyperlipidemia, CAD s/p CABG in 2003 and PCIs in 2013, chronic diastolic CHF (last EF 50% on Echo 2/17), DM, asthma, PVD c/o dizziness and chest pain x1 day. Patient referred to ER by cardiologist. EKG performed in triage - negative for STEMI. VS WDL. Patient A+Ox3, ambulatory, skin intact.

## 2017-10-11 NOTE — ED ADULT NURSE REASSESSMENT NOTE - NS ED NURSE REASSESS COMMENT FT1
Patient refusing to sign d/c paper work stating "my daughter told me not to sign anything from you people." IV accessed removed, patient left ER. DC papers given and patient verbalized understanding.

## 2017-10-11 NOTE — ED PROVIDER NOTE - PHYSICAL EXAMINATION
GEN: Well appearing, well nourished, awake, alert, oriented to person, place, time/situation and in no apparent distress.  ENT: Airway patent, Nasal mucosa clear. Mouth with normal mucosa.  EYES: Clear bilaterally.  RESPIRATORY: Breathing comfortably with normal RR.  CARDIAC: Regular rate and rhythm  ABDOMEN: Soft, nontender, +bowel sounds, no rebound, rigidity, or guarding.  MSK: Range of motion is not limited, no deformities noted.  NEURO: Alert and oriented x 3. Cn 2-12 intact. Strength 5/5 and sensation intact in all 4 extremities. no pronator drift. FTN normal. Pt   SKIN: Skin normal color for race, warm, dry and intact. No evidence of rash.  PSYCH: Alert and oriented to person, place, time/situation. normal mood and affect. no apparent risk to self or others.

## 2017-10-12 ENCOUNTER — CLINICAL ADVICE (OUTPATIENT)
Age: 82
End: 2017-10-12

## 2017-10-18 ENCOUNTER — APPOINTMENT (OUTPATIENT)
Dept: NEUROLOGY | Facility: CLINIC | Age: 82
End: 2017-10-18
Payer: MEDICARE

## 2017-10-18 VITALS
BODY MASS INDEX: 21.99 KG/M2 | HEIGHT: 60 IN | TEMPERATURE: 98.1 F | WEIGHT: 112 LBS | SYSTOLIC BLOOD PRESSURE: 135 MMHG | HEART RATE: 63 BPM | OXYGEN SATURATION: 97 % | DIASTOLIC BLOOD PRESSURE: 76 MMHG

## 2017-10-18 DIAGNOSIS — G95.9 DISEASE OF SPINAL CORD, UNSPECIFIED: ICD-10-CM

## 2017-10-18 PROCEDURE — 99215 OFFICE O/P EST HI 40 MIN: CPT

## 2017-10-19 ENCOUNTER — APPOINTMENT (OUTPATIENT)
Dept: INTERNAL MEDICINE | Facility: CLINIC | Age: 82
End: 2017-10-19
Payer: MEDICARE

## 2017-10-19 ENCOUNTER — MESSAGE (OUTPATIENT)
Age: 82
End: 2017-10-19

## 2017-10-19 VITALS
DIASTOLIC BLOOD PRESSURE: 69 MMHG | OXYGEN SATURATION: 98 % | HEART RATE: 70 BPM | WEIGHT: 114 LBS | TEMPERATURE: 98.3 F | SYSTOLIC BLOOD PRESSURE: 130 MMHG | BODY MASS INDEX: 22.38 KG/M2 | HEIGHT: 60 IN

## 2017-10-19 DIAGNOSIS — R63.0 ANOREXIA: ICD-10-CM

## 2017-10-19 DIAGNOSIS — Z72.820 SLEEP DEPRIVATION: ICD-10-CM

## 2017-10-19 PROCEDURE — G0008: CPT

## 2017-10-19 PROCEDURE — 99214 OFFICE O/P EST MOD 30 MIN: CPT | Mod: 25

## 2017-10-19 PROCEDURE — 90662 IIV NO PRSV INCREASED AG IM: CPT

## 2017-10-19 RX ORDER — KETOROLAC TROMETHAMINE 5 MG/ML
0.5 SOLUTION OPHTHALMIC
Qty: 2 | Refills: 6 | Status: COMPLETED | COMMUNITY
Start: 2017-06-19 | End: 2017-10-19

## 2017-10-19 RX ORDER — LOTEPREDNOL ETABONATE 5 MG/G
0.5 OINTMENT OPHTHALMIC
Qty: 1 | Refills: 1 | Status: COMPLETED | COMMUNITY
Start: 2017-03-10 | End: 2017-10-19

## 2017-10-24 ENCOUNTER — APPOINTMENT (OUTPATIENT)
Dept: ENDOCRINOLOGY | Facility: CLINIC | Age: 82
End: 2017-10-24

## 2017-10-26 ENCOUNTER — APPOINTMENT (OUTPATIENT)
Dept: NEPHROLOGY | Facility: CLINIC | Age: 82
End: 2017-10-26
Payer: MEDICARE

## 2017-10-26 VITALS
BODY MASS INDEX: 22.66 KG/M2 | HEART RATE: 72 BPM | DIASTOLIC BLOOD PRESSURE: 60 MMHG | SYSTOLIC BLOOD PRESSURE: 140 MMHG | TEMPERATURE: 98 F | RESPIRATION RATE: 18 BRPM | WEIGHT: 116 LBS

## 2017-10-26 PROCEDURE — 99214 OFFICE O/P EST MOD 30 MIN: CPT

## 2017-10-27 ENCOUNTER — APPOINTMENT (OUTPATIENT)
Dept: ENDOCRINOLOGY | Facility: CLINIC | Age: 82
End: 2017-10-27
Payer: MEDICARE

## 2017-10-27 VITALS
DIASTOLIC BLOOD PRESSURE: 78 MMHG | SYSTOLIC BLOOD PRESSURE: 130 MMHG | BODY MASS INDEX: 22.07 KG/M2 | WEIGHT: 113 LBS | HEART RATE: 63 BPM

## 2017-10-27 LAB — 24R-OH-CALCIDIOL SERPL-MCNC: 70.1 PG/ML

## 2017-10-27 PROCEDURE — 82962 GLUCOSE BLOOD TEST: CPT

## 2017-10-27 PROCEDURE — 83036 HEMOGLOBIN GLYCOSYLATED A1C: CPT | Mod: QW

## 2017-10-27 PROCEDURE — 99211 OFF/OP EST MAY X REQ PHY/QHP: CPT | Mod: 25

## 2017-10-30 ENCOUNTER — APPOINTMENT (OUTPATIENT)
Dept: ENDOCRINOLOGY | Facility: CLINIC | Age: 82
End: 2017-10-30
Payer: MEDICARE

## 2017-10-30 ENCOUNTER — APPOINTMENT (OUTPATIENT)
Dept: HEART AND VASCULAR | Facility: CLINIC | Age: 82
End: 2017-10-30
Payer: MEDICARE

## 2017-10-30 VITALS — DIASTOLIC BLOOD PRESSURE: 70 MMHG | SYSTOLIC BLOOD PRESSURE: 120 MMHG | HEART RATE: 54 BPM

## 2017-10-30 VITALS
HEART RATE: 65 BPM | WEIGHT: 113 LBS | BODY MASS INDEX: 22.19 KG/M2 | HEIGHT: 60 IN | DIASTOLIC BLOOD PRESSURE: 77 MMHG | SYSTOLIC BLOOD PRESSURE: 158 MMHG

## 2017-10-30 VITALS
BODY MASS INDEX: 22.26 KG/M2 | SYSTOLIC BLOOD PRESSURE: 146 MMHG | HEART RATE: 54 BPM | WEIGHT: 114 LBS | DIASTOLIC BLOOD PRESSURE: 70 MMHG

## 2017-10-30 DIAGNOSIS — Z86.39 PERSONAL HISTORY OF OTHER ENDOCRINE, NUTRITIONAL AND METABOLIC DISEASE: ICD-10-CM

## 2017-10-30 LAB
COPPER SERPL-MCNC: 127 UG/DL
HTLV I+II AB SER QL: NORMAL
VIT B6 SERPL-MCNC: 58.7 UG/L
ZINC SERPL-MCNC: 79 UG/DL

## 2017-10-30 PROCEDURE — 99214 OFFICE O/P EST MOD 30 MIN: CPT | Mod: 25

## 2017-10-30 PROCEDURE — 36415 COLL VENOUS BLD VENIPUNCTURE: CPT

## 2017-10-30 PROCEDURE — 93000 ELECTROCARDIOGRAM COMPLETE: CPT

## 2017-10-31 LAB
CHOLEST SERPL-MCNC: 265 MG/DL
CHOLEST/HDLC SERPL: 3.7 RATIO
HDLC SERPL-MCNC: 72 MG/DL
LDLC SERPL CALC-MCNC: 180 MG/DL
TRIGL SERPL-MCNC: 67 MG/DL

## 2017-11-01 LAB
A-TOCOPHEROL VIT E SERPL-MCNC: 16.4 MG/L
BETA+GAMMA TOCOPHEROL SERPL-MCNC: <1 MG/L
VIT B1 SERPL-MCNC: 114 NMOL/L

## 2017-11-02 ENCOUNTER — RESULT REVIEW (OUTPATIENT)
Age: 82
End: 2017-11-02

## 2017-11-02 LAB — MAGNESIUM RBC-MCNC: 4 MG/DL

## 2017-11-06 ENCOUNTER — OTHER (OUTPATIENT)
Age: 82
End: 2017-11-06

## 2017-11-06 ENCOUNTER — APPOINTMENT (OUTPATIENT)
Dept: INTERNAL MEDICINE | Facility: CLINIC | Age: 82
End: 2017-11-06

## 2017-11-06 ENCOUNTER — APPOINTMENT (OUTPATIENT)
Dept: INTERNAL MEDICINE | Facility: CLINIC | Age: 82
End: 2017-11-06
Payer: MEDICARE

## 2017-11-06 VITALS
HEART RATE: 64 BPM | BODY MASS INDEX: 22.26 KG/M2 | DIASTOLIC BLOOD PRESSURE: 69 MMHG | WEIGHT: 114 LBS | TEMPERATURE: 98.4 F | OXYGEN SATURATION: 98 % | SYSTOLIC BLOOD PRESSURE: 126 MMHG

## 2017-11-06 DIAGNOSIS — R55 SYNCOPE AND COLLAPSE: ICD-10-CM

## 2017-11-06 LAB
HBA1C MFR BLD HPLC: 6 %
METHYLMALONATE SERPL-SCNC: 173 NMOL/L

## 2017-11-06 PROCEDURE — 99214 OFFICE O/P EST MOD 30 MIN: CPT | Mod: 25,GC

## 2017-11-06 RX ORDER — MIRTAZAPINE 7.5 MG/1
7.5 TABLET, FILM COATED ORAL
Refills: 0 | Status: DISCONTINUED | COMMUNITY
End: 2017-11-06

## 2017-11-06 RX ORDER — TRAVOPROST (BENZALKONIUM) 0.004 %
0 DROPS OPHTHALMIC (EYE)
Refills: 0 | Status: ACTIVE | COMMUNITY

## 2017-11-14 ENCOUNTER — OUTPATIENT (OUTPATIENT)
Dept: OUTPATIENT SERVICES | Facility: HOSPITAL | Age: 82
LOS: 1 days | End: 2017-11-14
Payer: MEDICARE

## 2017-11-14 DIAGNOSIS — R55 SYNCOPE AND COLLAPSE: ICD-10-CM

## 2017-11-14 DIAGNOSIS — Z95.5 PRESENCE OF CORONARY ANGIOPLASTY IMPLANT AND GRAFT: Chronic | ICD-10-CM

## 2017-11-14 PROCEDURE — 93227 XTRNL ECG REC<48 HR R&I: CPT

## 2017-11-15 PROCEDURE — 93225 XTRNL ECG REC<48 HRS REC: CPT

## 2017-12-14 ENCOUNTER — APPOINTMENT (OUTPATIENT)
Dept: OPHTHALMOLOGY | Facility: CLINIC | Age: 82
End: 2017-12-14
Payer: MEDICARE

## 2017-12-14 DIAGNOSIS — H40.003 PREGLAUCOMA, UNSPECIFIED, BILATERAL: ICD-10-CM

## 2017-12-14 PROCEDURE — 92012 INTRM OPH EXAM EST PATIENT: CPT

## 2017-12-21 LAB
ALBUMIN SERPL ELPH-MCNC: 4 G/DL
ALP BLD-CCNC: 70 U/L
ALT SERPL-CCNC: 17 U/L
ANION GAP SERPL CALC-SCNC: 12 MMOL/L
APPEARANCE: CLEAR
AST SERPL-CCNC: 23 U/L
BACTERIA: NEGATIVE
BASOPHILS # BLD AUTO: 0.03 K/UL
BASOPHILS NFR BLD AUTO: 0.4 %
BILIRUB SERPL-MCNC: 0.3 MG/DL
BILIRUBIN URINE: NEGATIVE
BLOOD URINE: NEGATIVE
BUN SERPL-MCNC: 17 MG/DL
CALCIUM OXALATE CRYSTALS: ABNORMAL
CALCIUM SERPL-MCNC: 10.1 MG/DL
CHLORIDE SERPL-SCNC: 102 MMOL/L
CHOLEST SERPL-MCNC: 236 MG/DL
CHOLEST/HDLC SERPL: 3.7 RATIO
CO2 SERPL-SCNC: 26 MMOL/L
COLOR: YELLOW
CREAT SERPL-MCNC: 1.04 MG/DL
EOSINOPHIL # BLD AUTO: 0.17 K/UL
EOSINOPHIL NFR BLD AUTO: 2
GLUCOSE QUALITATIVE U: NEGATIVE MG/DL
GLUCOSE SERPL-MCNC: 103 MG/DL
HCT VFR BLD CALC: 35.3 %
HDLC SERPL-MCNC: 63 MG/DL
HGB BLD-MCNC: 11.6 G/DL
HYALINE CASTS: 0 /LPF
IMM GRANULOCYTES NFR BLD AUTO: 0.1 %
KETONES URINE: NEGATIVE
LDLC SERPL CALC-MCNC: 157 MG/DL
LEUKOCYTE ESTERASE URINE: NEGATIVE
LYMPHOCYTES # BLD AUTO: 2.41 K/UL
LYMPHOCYTES NFR BLD AUTO: 28.8 %
MAN DIFF?: NORMAL
MCHC RBC-ENTMCNC: 29 PG
MCHC RBC-ENTMCNC: 32.9 GM/DL
MCV RBC AUTO: 88.3 FL
MICROSCOPIC-UA: NORMAL
MONOCYTES # BLD AUTO: 0.68 K/UL
MONOCYTES NFR BLD AUTO: 8.1 %
NEUTROPHILS # BLD AUTO: 5.08 K/UL
NEUTROPHILS NFR BLD AUTO: 60.6 %
NITRITE URINE: NEGATIVE
PH URINE: 5
PHOSPHATE SERPL-MCNC: 3.5 MG/DL
PLATELET # BLD AUTO: 227 K/UL
POTASSIUM SERPL-SCNC: 4.2 MMOL/L
PROT SERPL-MCNC: 7.5 G/DL
PROTEIN URINE: NEGATIVE MG/DL
RBC # BLD: 4 M/UL
RBC # FLD: 13.3 %
RED BLOOD CELLS URINE: 0 /HPF
SODIUM SERPL-SCNC: 140 MMOL/L
SPECIFIC GRAVITY URINE: 1.02
SQUAMOUS EPITHELIAL CELLS: 1 /HPF
TRIGL SERPL-MCNC: 80 MG/DL
URATE SERPL-MCNC: 4.8 MG/DL
UROBILINOGEN URINE: 1 MG/DL
WBC # FLD AUTO: 8.38 K/UL
WHITE BLOOD CELLS URINE: 4 /HPF

## 2017-12-28 ENCOUNTER — APPOINTMENT (OUTPATIENT)
Dept: NEPHROLOGY | Facility: CLINIC | Age: 82
End: 2017-12-28
Payer: MEDICARE

## 2017-12-28 VITALS
WEIGHT: 116 LBS | BODY MASS INDEX: 22.66 KG/M2 | TEMPERATURE: 98 F | SYSTOLIC BLOOD PRESSURE: 140 MMHG | DIASTOLIC BLOOD PRESSURE: 70 MMHG | HEART RATE: 88 BPM

## 2017-12-28 PROCEDURE — 99214 OFFICE O/P EST MOD 30 MIN: CPT

## 2017-12-28 RX ORDER — ATORVASTATIN CALCIUM 80 MG/1
80 TABLET, FILM COATED ORAL
Refills: 0 | Status: DISCONTINUED | COMMUNITY
End: 2017-12-28

## 2018-01-08 ENCOUNTER — APPOINTMENT (OUTPATIENT)
Dept: INTERNAL MEDICINE | Facility: CLINIC | Age: 83
End: 2018-01-08
Payer: MEDICARE

## 2018-01-08 VITALS
DIASTOLIC BLOOD PRESSURE: 61 MMHG | BODY MASS INDEX: 22.46 KG/M2 | HEART RATE: 71 BPM | OXYGEN SATURATION: 96 % | WEIGHT: 115 LBS | TEMPERATURE: 97.7 F | SYSTOLIC BLOOD PRESSURE: 100 MMHG

## 2018-01-08 DIAGNOSIS — M25.60 STIFFNESS OF UNSPECIFIED JOINT, NOT ELSEWHERE CLASSIFIED: ICD-10-CM

## 2018-01-08 DIAGNOSIS — M19.041 PRIMARY OSTEOARTHRITIS, RIGHT HAND: ICD-10-CM

## 2018-01-08 DIAGNOSIS — R20.8 OTHER DISTURBANCES OF SKIN SENSATION: ICD-10-CM

## 2018-01-08 DIAGNOSIS — M19.042 PRIMARY OSTEOARTHRITIS, RIGHT HAND: ICD-10-CM

## 2018-01-08 PROCEDURE — 99214 OFFICE O/P EST MOD 30 MIN: CPT | Mod: 25

## 2018-01-08 PROCEDURE — 93000 ELECTROCARDIOGRAM COMPLETE: CPT

## 2018-01-08 PROCEDURE — 36415 COLL VENOUS BLD VENIPUNCTURE: CPT

## 2018-01-16 ENCOUNTER — FORM ENCOUNTER (OUTPATIENT)
Age: 83
End: 2018-01-16

## 2018-01-17 ENCOUNTER — OUTPATIENT (OUTPATIENT)
Dept: OUTPATIENT SERVICES | Facility: HOSPITAL | Age: 83
LOS: 1 days | End: 2018-01-17
Payer: MEDICARE

## 2018-01-17 DIAGNOSIS — Z95.5 PRESENCE OF CORONARY ANGIOPLASTY IMPLANT AND GRAFT: Chronic | ICD-10-CM

## 2018-01-17 PROCEDURE — 73130 X-RAY EXAM OF HAND: CPT | Mod: 26,50

## 2018-01-17 PROCEDURE — 73130 X-RAY EXAM OF HAND: CPT

## 2018-01-22 ENCOUNTER — APPOINTMENT (OUTPATIENT)
Dept: NEUROLOGY | Facility: CLINIC | Age: 83
End: 2018-01-22

## 2018-01-29 ENCOUNTER — APPOINTMENT (OUTPATIENT)
Dept: HEART AND VASCULAR | Facility: CLINIC | Age: 83
End: 2018-01-29
Payer: MEDICARE

## 2018-01-29 VITALS — SYSTOLIC BLOOD PRESSURE: 138 MMHG | HEART RATE: 56 BPM | DIASTOLIC BLOOD PRESSURE: 70 MMHG

## 2018-01-29 VITALS
HEART RATE: 56 BPM | DIASTOLIC BLOOD PRESSURE: 70 MMHG | BODY MASS INDEX: 22.38 KG/M2 | WEIGHT: 114 LBS | HEIGHT: 60 IN | SYSTOLIC BLOOD PRESSURE: 130 MMHG

## 2018-01-29 PROCEDURE — 36415 COLL VENOUS BLD VENIPUNCTURE: CPT

## 2018-01-29 PROCEDURE — 99214 OFFICE O/P EST MOD 30 MIN: CPT | Mod: 25

## 2018-01-29 PROCEDURE — 93000 ELECTROCARDIOGRAM COMPLETE: CPT

## 2018-02-06 ENCOUNTER — APPOINTMENT (OUTPATIENT)
Dept: RHEUMATOLOGY | Facility: CLINIC | Age: 83
End: 2018-02-06
Payer: MEDICARE

## 2018-02-06 ENCOUNTER — APPOINTMENT (OUTPATIENT)
Dept: RHEUMATOLOGY | Facility: CLINIC | Age: 83
End: 2018-02-06

## 2018-02-06 PROCEDURE — 99215 OFFICE O/P EST HI 40 MIN: CPT | Mod: 25

## 2018-02-06 PROCEDURE — 99205 OFFICE O/P NEW HI 60 MIN: CPT | Mod: 25

## 2018-02-08 ENCOUNTER — APPOINTMENT (OUTPATIENT)
Dept: NEUROLOGY | Facility: CLINIC | Age: 83
End: 2018-02-08
Payer: MEDICARE

## 2018-02-08 ENCOUNTER — OUTPATIENT (OUTPATIENT)
Dept: OUTPATIENT SERVICES | Facility: HOSPITAL | Age: 83
LOS: 1 days | End: 2018-02-08
Payer: MEDICARE

## 2018-02-08 VITALS
TEMPERATURE: 97.7 F | BODY MASS INDEX: 22.38 KG/M2 | DIASTOLIC BLOOD PRESSURE: 114 MMHG | HEIGHT: 60 IN | WEIGHT: 114 LBS | SYSTOLIC BLOOD PRESSURE: 164 MMHG | OXYGEN SATURATION: 96 % | HEART RATE: 67 BPM

## 2018-02-08 VITALS — DIASTOLIC BLOOD PRESSURE: 81 MMHG | SYSTOLIC BLOOD PRESSURE: 172 MMHG

## 2018-02-08 DIAGNOSIS — Z95.5 PRESENCE OF CORONARY ANGIOPLASTY IMPLANT AND GRAFT: Chronic | ICD-10-CM

## 2018-02-08 DIAGNOSIS — R26.9 UNSPECIFIED ABNORMALITIES OF GAIT AND MOBILITY: ICD-10-CM

## 2018-02-08 LAB
CCP AB SER IA-ACNC: <8 UNITS
RF+CCP IGG SER-IMP: NEGATIVE

## 2018-02-08 PROCEDURE — 72070 X-RAY EXAM THORAC SPINE 2VWS: CPT

## 2018-02-08 PROCEDURE — 72040 X-RAY EXAM NECK SPINE 2-3 VW: CPT | Mod: 26

## 2018-02-08 PROCEDURE — 72100 X-RAY EXAM L-S SPINE 2/3 VWS: CPT | Mod: 26

## 2018-02-08 PROCEDURE — 72100 X-RAY EXAM L-S SPINE 2/3 VWS: CPT

## 2018-02-08 PROCEDURE — 99214 OFFICE O/P EST MOD 30 MIN: CPT

## 2018-02-08 PROCEDURE — 72040 X-RAY EXAM NECK SPINE 2-3 VW: CPT

## 2018-02-08 PROCEDURE — 72070 X-RAY EXAM THORAC SPINE 2VWS: CPT | Mod: 26

## 2018-02-15 ENCOUNTER — APPOINTMENT (OUTPATIENT)
Dept: OPHTHALMOLOGY | Facility: CLINIC | Age: 83
End: 2018-02-15

## 2018-02-21 ENCOUNTER — APPOINTMENT (OUTPATIENT)
Dept: ENDOCRINOLOGY | Facility: CLINIC | Age: 83
End: 2018-02-21
Payer: MEDICARE

## 2018-02-21 VITALS
WEIGHT: 116 LBS | BODY MASS INDEX: 22.78 KG/M2 | DIASTOLIC BLOOD PRESSURE: 75 MMHG | HEART RATE: 64 BPM | SYSTOLIC BLOOD PRESSURE: 153 MMHG | HEIGHT: 60 IN

## 2018-02-21 PROCEDURE — 82962 GLUCOSE BLOOD TEST: CPT

## 2018-02-21 PROCEDURE — 99211 OFF/OP EST MAY X REQ PHY/QHP: CPT

## 2018-02-26 LAB
ALBUMIN SERPL ELPH-MCNC: 4 G/DL
ALP BLD-CCNC: 72 U/L
ALT SERPL-CCNC: 21 U/L
ANION GAP SERPL CALC-SCNC: 13 MMOL/L
APPEARANCE: CLEAR
AST SERPL-CCNC: 24 U/L
BACTERIA: NEGATIVE
BASOPHILS # BLD AUTO: 0.02 K/UL
BASOPHILS NFR BLD AUTO: 0.2 %
BILIRUB SERPL-MCNC: 0.5 MG/DL
BILIRUBIN URINE: NEGATIVE
BLOOD URINE: NEGATIVE
BUN SERPL-MCNC: 19 MG/DL
CALCIUM SERPL-MCNC: 10 MG/DL
CHLORIDE SERPL-SCNC: 102 MMOL/L
CHOLEST SERPL-MCNC: 239 MG/DL
CHOLEST/HDLC SERPL: 3.5 RATIO
CO2 SERPL-SCNC: 26 MMOL/L
COLOR: YELLOW
CREAT SERPL-MCNC: 1.14 MG/DL
EOSINOPHIL # BLD AUTO: 0.2 K/UL
EOSINOPHIL NFR BLD AUTO: 2.4 %
GLUCOSE QUALITATIVE U: NEGATIVE MG/DL
GLUCOSE SERPL-MCNC: 137 MG/DL
HCT VFR BLD CALC: 36.3 %
HDLC SERPL-MCNC: 69 MG/DL
HGB BLD-MCNC: 11.7 G/DL
HYALINE CASTS: 1 /LPF
IMM GRANULOCYTES NFR BLD AUTO: 0.2 %
KETONES URINE: ABNORMAL
LDLC SERPL CALC-MCNC: 157 MG/DL
LEUKOCYTE ESTERASE URINE: NEGATIVE
LYMPHOCYTES # BLD AUTO: 2.1 K/UL
LYMPHOCYTES NFR BLD AUTO: 25.5 %
MAN DIFF?: NORMAL
MCHC RBC-ENTMCNC: 28.5 PG
MCHC RBC-ENTMCNC: 32.2 GM/DL
MCV RBC AUTO: 88.5 FL
MICROSCOPIC-UA: NORMAL
MONOCYTES # BLD AUTO: 0.46 K/UL
MONOCYTES NFR BLD AUTO: 5.6 %
NEUTROPHILS # BLD AUTO: 5.42 K/UL
NEUTROPHILS NFR BLD AUTO: 66.1 %
NITRITE URINE: NEGATIVE
PH URINE: 5
PHOSPHATE SERPL-MCNC: 3 MG/DL
PLATELET # BLD AUTO: 246 K/UL
POTASSIUM SERPL-SCNC: 4 MMOL/L
PROT SERPL-MCNC: 7.2 G/DL
PROTEIN URINE: ABNORMAL MG/DL
RBC # BLD: 4.1 M/UL
RBC # FLD: 14.9 %
RED BLOOD CELLS URINE: 5 /HPF
SODIUM SERPL-SCNC: 141 MMOL/L
SPECIFIC GRAVITY URINE: 1.02
SQUAMOUS EPITHELIAL CELLS: 2 /HPF
TRIGL SERPL-MCNC: 64 MG/DL
URATE SERPL-MCNC: 5.5 MG/DL
UROBILINOGEN URINE: NEGATIVE MG/DL
WBC # FLD AUTO: 8.22 K/UL
WHITE BLOOD CELLS URINE: 4 /HPF

## 2018-03-01 ENCOUNTER — APPOINTMENT (OUTPATIENT)
Dept: NEPHROLOGY | Facility: CLINIC | Age: 83
End: 2018-03-01
Payer: MEDICARE

## 2018-03-01 VITALS
SYSTOLIC BLOOD PRESSURE: 153 MMHG | HEART RATE: 80 BPM | DIASTOLIC BLOOD PRESSURE: 70 MMHG | WEIGHT: 117 LBS | BODY MASS INDEX: 22.85 KG/M2

## 2018-03-01 DIAGNOSIS — G89.29 LOW BACK PAIN: ICD-10-CM

## 2018-03-01 DIAGNOSIS — M54.5 LOW BACK PAIN: ICD-10-CM

## 2018-03-01 PROCEDURE — 99214 OFFICE O/P EST MOD 30 MIN: CPT

## 2018-03-06 LAB
25(OH)D3 SERPL-MCNC: 25.2 NG/ML
ANA PAT FLD IF-IMP: ABNORMAL
ANA SER IF-ACNC: ABNORMAL
BASOPHILS # BLD AUTO: 0.01 K/UL
BASOPHILS NFR BLD AUTO: 0.1 %
CK SERPL-CCNC: 173 U/L
CRP SERPL-MCNC: <0.2 MG/DL
EOSINOPHIL # BLD AUTO: 0.17 K/UL
EOSINOPHIL NFR BLD AUTO: 2.2 %
ERYTHROCYTE [SEDIMENTATION RATE] IN BLOOD BY WESTERGREN METHOD: 22 MM/HR
HCT VFR BLD CALC: 34.3 %
HGB BLD-MCNC: 11.1 G/DL
IMM GRANULOCYTES NFR BLD AUTO: 0.1 %
LYMPHOCYTES # BLD AUTO: 1.81 K/UL
LYMPHOCYTES NFR BLD AUTO: 23 %
MAGNESIUM SERPL-MCNC: 1.9 MG/DL
MAN DIFF?: NORMAL
MCHC RBC-ENTMCNC: 28.6 PG
MCHC RBC-ENTMCNC: 32.4 GM/DL
MCV RBC AUTO: 88.4 FL
MONOCYTES # BLD AUTO: 0.6 K/UL
MONOCYTES NFR BLD AUTO: 7.6 %
NEUTROPHILS # BLD AUTO: 5.27 K/UL
NEUTROPHILS NFR BLD AUTO: 67 %
PLATELET # BLD AUTO: 232 K/UL
RBC # BLD: 3.88 M/UL
RBC # FLD: 14.1 %
RHEUMATOID FACT SER QL: 11 IU/ML
T4 FREE SERPL-MCNC: 1.2 NG/DL
TSH SERPL-ACNC: 0.35 UIU/ML
WBC # FLD AUTO: 7.87 K/UL

## 2018-03-08 ENCOUNTER — APPOINTMENT (OUTPATIENT)
Dept: RHEUMATOLOGY | Facility: CLINIC | Age: 83
End: 2018-03-08
Payer: MEDICARE

## 2018-03-08 VITALS
HEIGHT: 60 IN | WEIGHT: 115 LBS | HEART RATE: 79 BPM | DIASTOLIC BLOOD PRESSURE: 75 MMHG | SYSTOLIC BLOOD PRESSURE: 169 MMHG | BODY MASS INDEX: 22.58 KG/M2 | OXYGEN SATURATION: 98 %

## 2018-03-08 DIAGNOSIS — M15.4 EROSIVE (OSTEO)ARTHRITIS: ICD-10-CM

## 2018-03-08 DIAGNOSIS — M25.40 EFFUSION, UNSPECIFIED JOINT: ICD-10-CM

## 2018-03-08 PROCEDURE — 99214 OFFICE O/P EST MOD 30 MIN: CPT

## 2018-03-11 ENCOUNTER — MOBILE ON CALL (OUTPATIENT)
Age: 83
End: 2018-03-11

## 2018-03-21 ENCOUNTER — APPOINTMENT (OUTPATIENT)
Dept: PHYSICAL MEDICINE AND REHAB | Facility: CLINIC | Age: 83
End: 2018-03-21
Payer: MEDICARE

## 2018-03-21 VITALS — BODY MASS INDEX: 22.58 KG/M2 | HEIGHT: 60 IN | WEIGHT: 115 LBS

## 2018-03-21 PROCEDURE — 99204 OFFICE O/P NEW MOD 45 MIN: CPT

## 2018-03-28 LAB
ALBUMIN SERPL ELPH-MCNC: 4.2 G/DL
ALP BLD-CCNC: 85 U/L
ALT SERPL-CCNC: 18 U/L
ANION GAP SERPL CALC-SCNC: 15 MMOL/L
APPEARANCE: CLEAR
AST SERPL-CCNC: 24 U/L
BACTERIA: NEGATIVE
BASOPHILS # BLD AUTO: 0.02 K/UL
BASOPHILS NFR BLD AUTO: 0.3 %
BILIRUB SERPL-MCNC: 0.2 MG/DL
BILIRUBIN URINE: NEGATIVE
BLOOD URINE: NEGATIVE
BUN SERPL-MCNC: 21 MG/DL
CALCIUM SERPL-MCNC: 9.9 MG/DL
CALCIUM SERPL-MCNC: 9.9 MG/DL
CHLORIDE SERPL-SCNC: 102 MMOL/L
CHOLEST SERPL-MCNC: 248 MG/DL
CHOLEST/HDLC SERPL: 3.8 RATIO
CO2 SERPL-SCNC: 24 MMOL/L
COLOR: YELLOW
CREAT SERPL-MCNC: 1.14 MG/DL
EOSINOPHIL # BLD AUTO: 0.27 K/UL
EOSINOPHIL NFR BLD AUTO: 3.4 %
GLUCOSE QUALITATIVE U: NEGATIVE MG/DL
GLUCOSE SERPL-MCNC: 83 MG/DL
HCT VFR BLD CALC: 37.4 %
HDLC SERPL-MCNC: 66 MG/DL
HGB BLD-MCNC: 12.5 G/DL
IMM GRANULOCYTES NFR BLD AUTO: 0.1 %
KETONES URINE: NEGATIVE
LDLC SERPL CALC-MCNC: 162 MG/DL
LEUKOCYTE ESTERASE URINE: NEGATIVE
LYMPHOCYTES # BLD AUTO: 2 K/UL
LYMPHOCYTES NFR BLD AUTO: 25.3 %
MAN DIFF?: NORMAL
MCHC RBC-ENTMCNC: 29.5 PG
MCHC RBC-ENTMCNC: 33.4 GM/DL
MCV RBC AUTO: 88.2 FL
MICROSCOPIC-UA: NORMAL
MONOCYTES # BLD AUTO: 0.59 K/UL
MONOCYTES NFR BLD AUTO: 7.5 %
NEUTROPHILS # BLD AUTO: 5 K/UL
NEUTROPHILS NFR BLD AUTO: 63.4 %
NITRITE URINE: NEGATIVE
PARATHYROID HORMONE INTACT: 67 PG/ML
PH URINE: 5
PHOSPHATE SERPL-MCNC: 3.4 MG/DL
PLATELET # BLD AUTO: 229 K/UL
POTASSIUM SERPL-SCNC: 4.3 MMOL/L
PROT SERPL-MCNC: 7.4 G/DL
PROTEIN URINE: NEGATIVE MG/DL
RBC # BLD: 4.24 M/UL
RBC # FLD: 14.3 %
RED BLOOD CELLS URINE: 2 /HPF
SODIUM SERPL-SCNC: 141 MMOL/L
SPECIFIC GRAVITY URINE: 1.02
SQUAMOUS EPITHELIAL CELLS: 1 /HPF
TRIGL SERPL-MCNC: 101 MG/DL
URATE SERPL-MCNC: 6 MG/DL
UROBILINOGEN URINE: NEGATIVE MG/DL
WBC # FLD AUTO: 7.89 K/UL
WHITE BLOOD CELLS URINE: 1 /HPF

## 2018-03-29 ENCOUNTER — APPOINTMENT (OUTPATIENT)
Dept: HEART AND VASCULAR | Facility: CLINIC | Age: 83
End: 2018-03-29
Payer: MEDICARE

## 2018-03-29 VITALS
BODY MASS INDEX: 22.58 KG/M2 | HEART RATE: 67 BPM | HEIGHT: 60 IN | WEIGHT: 115 LBS | DIASTOLIC BLOOD PRESSURE: 74 MMHG | SYSTOLIC BLOOD PRESSURE: 114 MMHG

## 2018-03-29 VITALS — DIASTOLIC BLOOD PRESSURE: 88 MMHG | HEART RATE: 67 BPM | SYSTOLIC BLOOD PRESSURE: 148 MMHG

## 2018-03-29 DIAGNOSIS — R51 HEADACHE: ICD-10-CM

## 2018-03-29 PROCEDURE — 93000 ELECTROCARDIOGRAM COMPLETE: CPT

## 2018-03-29 PROCEDURE — 99214 OFFICE O/P EST MOD 30 MIN: CPT | Mod: 25

## 2018-04-10 ENCOUNTER — APPOINTMENT (OUTPATIENT)
Dept: RHEUMATOLOGY | Facility: CLINIC | Age: 83
End: 2018-04-10
Payer: MEDICARE

## 2018-04-10 VITALS
BODY MASS INDEX: 22.38 KG/M2 | SYSTOLIC BLOOD PRESSURE: 175 MMHG | DIASTOLIC BLOOD PRESSURE: 84 MMHG | WEIGHT: 114 LBS | OXYGEN SATURATION: 98 % | HEART RATE: 67 BPM | HEIGHT: 60 IN

## 2018-04-10 DIAGNOSIS — R52 PAIN, UNSPECIFIED: ICD-10-CM

## 2018-04-10 DIAGNOSIS — M25.50 PAIN IN UNSPECIFIED JOINT: ICD-10-CM

## 2018-04-10 PROCEDURE — 20600 DRAIN/INJ JOINT/BURSA W/O US: CPT

## 2018-04-10 PROCEDURE — 99213 OFFICE O/P EST LOW 20 MIN: CPT | Mod: 25

## 2018-04-10 RX ADMIN — LIDOCAINE HYDROCHLORIDE %: 10 INJECTION, SOLUTION INFILTRATION; PERINEURAL at 00:00

## 2018-04-10 RX ADMIN — METHYLPREDNISOLONE ACETATE MG/ML: 40 INJECTION, SUSPENSION INTRA-ARTICULAR; INTRALESIONAL; INTRAMUSCULAR; SOFT TISSUE at 00:00

## 2018-04-11 ENCOUNTER — MED ADMIN CHARGE (OUTPATIENT)
Age: 83
End: 2018-04-11

## 2018-04-11 RX ORDER — LIDOCAINE HYDROCHLORIDE 10 MG/ML
1 INJECTION, SOLUTION INFILTRATION; PERINEURAL
Qty: 0 | Refills: 0 | Status: COMPLETED | OUTPATIENT
Start: 2018-04-10

## 2018-04-11 RX ORDER — METHYLPRED ACET/NACL,ISO-OS/PF 80 MG/ML
80 VIAL (ML) INJECTION
Qty: 1 | Refills: 0 | Status: COMPLETED | OUTPATIENT
Start: 2018-04-10

## 2018-04-30 ENCOUNTER — APPOINTMENT (OUTPATIENT)
Dept: INTERNAL MEDICINE | Facility: CLINIC | Age: 83
End: 2018-04-30
Payer: MEDICARE

## 2018-04-30 ENCOUNTER — FORM ENCOUNTER (OUTPATIENT)
Age: 83
End: 2018-04-30

## 2018-04-30 VITALS
SYSTOLIC BLOOD PRESSURE: 116 MMHG | DIASTOLIC BLOOD PRESSURE: 66 MMHG | WEIGHT: 114 LBS | OXYGEN SATURATION: 97 % | HEIGHT: 60 IN | HEART RATE: 77 BPM | BODY MASS INDEX: 22.38 KG/M2 | TEMPERATURE: 98.4 F

## 2018-04-30 DIAGNOSIS — K21.9 GASTRO-ESOPHAGEAL REFLUX DISEASE W/OUT ESOPHAGITIS: ICD-10-CM

## 2018-04-30 PROCEDURE — 99214 OFFICE O/P EST MOD 30 MIN: CPT | Mod: 25,GC

## 2018-04-30 RX ORDER — COLCHICINE 0.6 MG/1
0.6 TABLET ORAL DAILY
Qty: 30 | Refills: 3 | Status: DISCONTINUED | COMMUNITY
Start: 2018-02-07 | End: 2018-04-30

## 2018-04-30 RX ORDER — PITAVASTATIN CALCIUM 2.09 MG/1
2 TABLET, FILM COATED ORAL
Qty: 30 | Refills: 5 | Status: DISCONTINUED | COMMUNITY
Start: 2018-01-29 | End: 2018-04-30

## 2018-05-01 ENCOUNTER — APPOINTMENT (OUTPATIENT)
Dept: ENDOCRINOLOGY | Facility: CLINIC | Age: 83
End: 2018-05-01

## 2018-05-01 ENCOUNTER — APPOINTMENT (OUTPATIENT)
Dept: ORTHOPEDIC SURGERY | Facility: CLINIC | Age: 83
End: 2018-05-01
Payer: MEDICARE

## 2018-05-01 ENCOUNTER — OUTPATIENT (OUTPATIENT)
Dept: OUTPATIENT SERVICES | Facility: HOSPITAL | Age: 83
LOS: 1 days | End: 2018-05-01
Payer: MEDICARE

## 2018-05-01 VITALS
HEIGHT: 59 IN | BODY MASS INDEX: 22.98 KG/M2 | WEIGHT: 114 LBS | SYSTOLIC BLOOD PRESSURE: 120 MMHG | DIASTOLIC BLOOD PRESSURE: 80 MMHG

## 2018-05-01 DIAGNOSIS — M16.12 UNILATERAL PRIMARY OSTEOARTHRITIS, LEFT HIP: ICD-10-CM

## 2018-05-01 DIAGNOSIS — Z95.5 PRESENCE OF CORONARY ANGIOPLASTY IMPLANT AND GRAFT: Chronic | ICD-10-CM

## 2018-05-01 PROCEDURE — 73502 X-RAY EXAM HIP UNI 2-3 VIEWS: CPT

## 2018-05-01 PROCEDURE — 99213 OFFICE O/P EST LOW 20 MIN: CPT

## 2018-05-01 PROCEDURE — 73502 X-RAY EXAM HIP UNI 2-3 VIEWS: CPT | Mod: 26,LT

## 2018-05-03 ENCOUNTER — APPOINTMENT (OUTPATIENT)
Dept: NEPHROLOGY | Facility: CLINIC | Age: 83
End: 2018-05-03
Payer: MEDICARE

## 2018-05-08 ENCOUNTER — APPOINTMENT (OUTPATIENT)
Dept: NEPHROLOGY | Facility: CLINIC | Age: 83
End: 2018-05-08
Payer: MEDICARE

## 2018-05-08 ENCOUNTER — APPOINTMENT (OUTPATIENT)
Dept: RHEUMATOLOGY | Facility: CLINIC | Age: 83
End: 2018-05-08
Payer: MEDICARE

## 2018-05-08 VITALS
WEIGHT: 111 LBS | DIASTOLIC BLOOD PRESSURE: 73 MMHG | HEIGHT: 59 IN | SYSTOLIC BLOOD PRESSURE: 142 MMHG | OXYGEN SATURATION: 100 % | BODY MASS INDEX: 22.38 KG/M2 | HEART RATE: 70 BPM

## 2018-05-08 VITALS
BODY MASS INDEX: 22.82 KG/M2 | WEIGHT: 113 LBS | SYSTOLIC BLOOD PRESSURE: 142 MMHG | HEART RATE: 76 BPM | TEMPERATURE: 98.6 F | DIASTOLIC BLOOD PRESSURE: 60 MMHG

## 2018-05-08 DIAGNOSIS — Z13.820 ENCOUNTER FOR SCREENING FOR OSTEOPOROSIS: ICD-10-CM

## 2018-05-08 DIAGNOSIS — R79.89 OTHER SPECIFIED ABNORMAL FINDINGS OF BLOOD CHEMISTRY: ICD-10-CM

## 2018-05-08 PROCEDURE — 99214 OFFICE O/P EST MOD 30 MIN: CPT

## 2018-05-09 ENCOUNTER — APPOINTMENT (OUTPATIENT)
Dept: PHYSICAL MEDICINE AND REHAB | Facility: CLINIC | Age: 83
End: 2018-05-09

## 2018-05-09 ENCOUNTER — APPOINTMENT (OUTPATIENT)
Dept: ULTRASOUND IMAGING | Facility: HOSPITAL | Age: 83
End: 2018-05-09
Payer: MEDICARE

## 2018-05-09 ENCOUNTER — OUTPATIENT (OUTPATIENT)
Dept: OUTPATIENT SERVICES | Facility: HOSPITAL | Age: 83
LOS: 1 days | End: 2018-05-09
Payer: MEDICARE

## 2018-05-09 DIAGNOSIS — Z95.5 PRESENCE OF CORONARY ANGIOPLASTY IMPLANT AND GRAFT: Chronic | ICD-10-CM

## 2018-05-09 PROCEDURE — 20611 DRAIN/INJ JOINT/BURSA W/US: CPT

## 2018-05-09 PROCEDURE — 20611 DRAIN/INJ JOINT/BURSA W/US: CPT | Mod: LT

## 2018-05-14 ENCOUNTER — APPOINTMENT (OUTPATIENT)
Dept: ENDOCRINOLOGY | Facility: CLINIC | Age: 83
End: 2018-05-14
Payer: MEDICARE

## 2018-05-14 VITALS
WEIGHT: 112 LBS | DIASTOLIC BLOOD PRESSURE: 84 MMHG | BODY MASS INDEX: 22.62 KG/M2 | HEART RATE: 71 BPM | SYSTOLIC BLOOD PRESSURE: 164 MMHG

## 2018-05-14 DIAGNOSIS — R63.4 ABNORMAL WEIGHT LOSS: ICD-10-CM

## 2018-05-14 PROCEDURE — 99214 OFFICE O/P EST MOD 30 MIN: CPT | Mod: 25

## 2018-05-14 PROCEDURE — 36415 COLL VENOUS BLD VENIPUNCTURE: CPT

## 2018-05-14 PROCEDURE — 97802 MEDICAL NUTRITION INDIV IN: CPT

## 2018-05-15 LAB
ALBUMIN SERPL ELPH-MCNC: 4.1 G/DL
ALP BLD-CCNC: 84 U/L
ALT SERPL-CCNC: 19 U/L
ANION GAP SERPL CALC-SCNC: 12 MMOL/L
APPEARANCE: CLEAR
AST SERPL-CCNC: 16 U/L
BACTERIA: NEGATIVE
BASOPHILS # BLD AUTO: 0.02 K/UL
BASOPHILS NFR BLD AUTO: 0.2 %
BILIRUB SERPL-MCNC: 0.2 MG/DL
BILIRUBIN URINE: NEGATIVE
BLOOD URINE: NEGATIVE
BUN SERPL-MCNC: 25 MG/DL
CALCIUM OXALATE CRYSTALS: ABNORMAL
CALCIUM SERPL-MCNC: 10 MG/DL
CHLORIDE SERPL-SCNC: 104 MMOL/L
CHOLEST SERPL-MCNC: 236 MG/DL
CHOLEST/HDLC SERPL: 3.3 RATIO
CO2 SERPL-SCNC: 25 MMOL/L
COLOR: YELLOW
CREAT SERPL-MCNC: 1.12 MG/DL
EOSINOPHIL # BLD AUTO: 0.17 K/UL
EOSINOPHIL NFR BLD AUTO: 1.8 %
GLUCOSE QUALITATIVE U: NEGATIVE MG/DL
GLUCOSE SERPL-MCNC: 123 MG/DL
HCT VFR BLD CALC: 35.6 %
HDLC SERPL-MCNC: 71 MG/DL
HGB BLD-MCNC: 12 G/DL
IMM GRANULOCYTES NFR BLD AUTO: 0.4 %
KETONES URINE: ABNORMAL
LDLC SERPL CALC-MCNC: 151 MG/DL
LEUKOCYTE ESTERASE URINE: NEGATIVE
LYMPHOCYTES # BLD AUTO: 2.9 K/UL
LYMPHOCYTES NFR BLD AUTO: 31.5 %
MAN DIFF?: NORMAL
MCHC RBC-ENTMCNC: 29.3 PG
MCHC RBC-ENTMCNC: 33.7 GM/DL
MCV RBC AUTO: 87 FL
MICROSCOPIC-UA: NORMAL
MONOCYTES # BLD AUTO: 0.59 K/UL
MONOCYTES NFR BLD AUTO: 6.4 %
NEUTROPHILS # BLD AUTO: 5.48 K/UL
NEUTROPHILS NFR BLD AUTO: 59.7 %
NITRITE URINE: NEGATIVE
PH URINE: 5
PHOSPHATE SERPL-MCNC: 3.7 MG/DL
PLATELET # BLD AUTO: 217 K/UL
POTASSIUM SERPL-SCNC: 4.8 MMOL/L
PROT SERPL-MCNC: 7.1 G/DL
PROTEIN URINE: NEGATIVE MG/DL
RBC # BLD: 4.09 M/UL
RBC # FLD: 14.6 %
RED BLOOD CELLS URINE: 6 /HPF
SODIUM SERPL-SCNC: 141 MMOL/L
SPECIFIC GRAVITY URINE: 1.02
SQUAMOUS EPITHELIAL CELLS: 0 /HPF
TRIGL SERPL-MCNC: 70 MG/DL
URATE SERPL-MCNC: 4.9 MG/DL
UROBILINOGEN URINE: NEGATIVE MG/DL
WBC # FLD AUTO: 9.2 K/UL
WHITE BLOOD CELLS URINE: 1 /HPF

## 2018-05-17 ENCOUNTER — APPOINTMENT (OUTPATIENT)
Dept: NEUROLOGY | Facility: CLINIC | Age: 83
End: 2018-05-17
Payer: MEDICARE

## 2018-05-17 VITALS
DIASTOLIC BLOOD PRESSURE: 82 MMHG | WEIGHT: 114 LBS | BODY MASS INDEX: 22.98 KG/M2 | HEART RATE: 70 BPM | OXYGEN SATURATION: 98 % | TEMPERATURE: 98.2 F | HEIGHT: 59 IN | SYSTOLIC BLOOD PRESSURE: 133 MMHG

## 2018-05-17 DIAGNOSIS — M79.1 MYALGIA: ICD-10-CM

## 2018-05-17 DIAGNOSIS — M65.30 TRIGGER FINGER, UNSPECIFIED FINGER: ICD-10-CM

## 2018-05-17 DIAGNOSIS — M54.9 DORSALGIA, UNSPECIFIED: ICD-10-CM

## 2018-05-17 DIAGNOSIS — M40.209 UNSPECIFIED KYPHOSIS, SITE UNSPECIFIED: ICD-10-CM

## 2018-05-17 PROCEDURE — 99214 OFFICE O/P EST MOD 30 MIN: CPT

## 2018-05-25 ENCOUNTER — EMERGENCY (EMERGENCY)
Facility: HOSPITAL | Age: 83
LOS: 1 days | Discharge: HOME | End: 2018-05-25
Attending: EMERGENCY MEDICINE | Admitting: EMERGENCY MEDICINE
Payer: MEDICARE

## 2018-05-25 VITALS
DIASTOLIC BLOOD PRESSURE: 72 MMHG | TEMPERATURE: 98 F | HEART RATE: 54 BPM | SYSTOLIC BLOOD PRESSURE: 152 MMHG | RESPIRATION RATE: 18 BRPM | OXYGEN SATURATION: 98 %

## 2018-05-25 VITALS
DIASTOLIC BLOOD PRESSURE: 69 MMHG | HEART RATE: 65 BPM | SYSTOLIC BLOOD PRESSURE: 118 MMHG | TEMPERATURE: 98 F | OXYGEN SATURATION: 96 % | RESPIRATION RATE: 18 BRPM | WEIGHT: 249.12 LBS

## 2018-05-25 DIAGNOSIS — Z95.5 PRESENCE OF CORONARY ANGIOPLASTY IMPLANT AND GRAFT: Chronic | ICD-10-CM

## 2018-05-25 LAB
ALBUMIN SERPL ELPH-MCNC: 3.7 G/DL — SIGNIFICANT CHANGE UP (ref 3.3–5)
ALP SERPL-CCNC: 60 U/L — SIGNIFICANT CHANGE UP (ref 40–120)
ALT FLD-CCNC: 14 U/L — SIGNIFICANT CHANGE UP (ref 10–45)
ANION GAP SERPL CALC-SCNC: 7 MMOL/L — SIGNIFICANT CHANGE UP (ref 5–17)
APTT BLD: 35.4 SEC — SIGNIFICANT CHANGE UP (ref 27.5–37.4)
AST SERPL-CCNC: 19 U/L — SIGNIFICANT CHANGE UP (ref 10–40)
BASOPHILS NFR BLD AUTO: 0.3 % — SIGNIFICANT CHANGE UP (ref 0–2)
BILIRUB SERPL-MCNC: 0.5 MG/DL — SIGNIFICANT CHANGE UP (ref 0.2–1.2)
BUN SERPL-MCNC: 18 MG/DL — SIGNIFICANT CHANGE UP (ref 7–23)
CALCIUM SERPL-MCNC: 9.6 MG/DL — SIGNIFICANT CHANGE UP (ref 8.4–10.5)
CHLORIDE SERPL-SCNC: 104 MMOL/L — SIGNIFICANT CHANGE UP (ref 96–108)
CO2 SERPL-SCNC: 27 MMOL/L — SIGNIFICANT CHANGE UP (ref 22–31)
CREAT SERPL-MCNC: 1.06 MG/DL — SIGNIFICANT CHANGE UP (ref 0.5–1.3)
EOSINOPHIL NFR BLD AUTO: 3.1 % — SIGNIFICANT CHANGE UP (ref 0–6)
GLUCOSE SERPL-MCNC: 81 MG/DL — SIGNIFICANT CHANGE UP (ref 70–99)
HCT VFR BLD CALC: 33.8 % — LOW (ref 34.5–45)
HGB BLD-MCNC: 11.5 G/DL — SIGNIFICANT CHANGE UP (ref 11.5–15.5)
INR BLD: 1.03 — SIGNIFICANT CHANGE UP (ref 0.88–1.16)
LYMPHOCYTES # BLD AUTO: 26.3 % — SIGNIFICANT CHANGE UP (ref 13–44)
MCHC RBC-ENTMCNC: 29.3 PG — SIGNIFICANT CHANGE UP (ref 27–34)
MCHC RBC-ENTMCNC: 34 G/DL — SIGNIFICANT CHANGE UP (ref 32–36)
MCV RBC AUTO: 86.2 FL — SIGNIFICANT CHANGE UP (ref 80–100)
MONOCYTES NFR BLD AUTO: 7 % — SIGNIFICANT CHANGE UP (ref 2–14)
NEUTROPHILS NFR BLD AUTO: 63.3 % — SIGNIFICANT CHANGE UP (ref 43–77)
PLATELET # BLD AUTO: 182 K/UL — SIGNIFICANT CHANGE UP (ref 150–400)
POTASSIUM SERPL-MCNC: 4.4 MMOL/L — SIGNIFICANT CHANGE UP (ref 3.5–5.3)
POTASSIUM SERPL-SCNC: 4.4 MMOL/L — SIGNIFICANT CHANGE UP (ref 3.5–5.3)
PROT SERPL-MCNC: 6.6 G/DL — SIGNIFICANT CHANGE UP (ref 6–8.3)
PROTHROM AB SERPL-ACNC: 11.4 SEC — SIGNIFICANT CHANGE UP (ref 9.8–12.7)
RBC # BLD: 3.92 M/UL — SIGNIFICANT CHANGE UP (ref 3.8–5.2)
RBC # FLD: 14.1 % — SIGNIFICANT CHANGE UP (ref 10.3–16.9)
SODIUM SERPL-SCNC: 138 MMOL/L — SIGNIFICANT CHANGE UP (ref 135–145)
WBC # BLD: 7.8 K/UL — SIGNIFICANT CHANGE UP (ref 3.8–10.5)
WBC # FLD AUTO: 7.8 K/UL — SIGNIFICANT CHANGE UP (ref 3.8–10.5)

## 2018-05-25 PROCEDURE — 36415 COLL VENOUS BLD VENIPUNCTURE: CPT

## 2018-05-25 PROCEDURE — 85610 PROTHROMBIN TIME: CPT

## 2018-05-25 PROCEDURE — 99284 EMERGENCY DEPT VISIT MOD MDM: CPT

## 2018-05-25 PROCEDURE — 99284 EMERGENCY DEPT VISIT MOD MDM: CPT | Mod: 25

## 2018-05-25 PROCEDURE — 96374 THER/PROPH/DIAG INJ IV PUSH: CPT

## 2018-05-25 PROCEDURE — 96375 TX/PRO/DX INJ NEW DRUG ADDON: CPT

## 2018-05-25 PROCEDURE — 70450 CT HEAD/BRAIN W/O DYE: CPT

## 2018-05-25 PROCEDURE — 85025 COMPLETE CBC W/AUTO DIFF WBC: CPT

## 2018-05-25 PROCEDURE — 70450 CT HEAD/BRAIN W/O DYE: CPT | Mod: 26

## 2018-05-25 PROCEDURE — 80053 COMPREHEN METABOLIC PANEL: CPT

## 2018-05-25 PROCEDURE — 85730 THROMBOPLASTIN TIME PARTIAL: CPT

## 2018-05-25 RX ORDER — ACETAMINOPHEN 500 MG
975 TABLET ORAL ONCE
Qty: 0 | Refills: 0 | Status: COMPLETED | OUTPATIENT
Start: 2018-05-25 | End: 2018-05-25

## 2018-05-25 RX ORDER — SODIUM CHLORIDE 9 MG/ML
1000 INJECTION INTRAMUSCULAR; INTRAVENOUS; SUBCUTANEOUS ONCE
Qty: 0 | Refills: 0 | Status: COMPLETED | OUTPATIENT
Start: 2018-05-25 | End: 2018-05-25

## 2018-05-25 RX ORDER — METOCLOPRAMIDE HCL 10 MG
10 TABLET ORAL ONCE
Qty: 0 | Refills: 0 | Status: COMPLETED | OUTPATIENT
Start: 2018-05-25 | End: 2018-05-25

## 2018-05-25 RX ORDER — KETOROLAC TROMETHAMINE 30 MG/ML
15 SYRINGE (ML) INJECTION ONCE
Qty: 0 | Refills: 0 | Status: DISCONTINUED | OUTPATIENT
Start: 2018-05-25 | End: 2018-05-25

## 2018-05-25 RX ADMIN — SODIUM CHLORIDE 2000 MILLILITER(S): 9 INJECTION INTRAMUSCULAR; INTRAVENOUS; SUBCUTANEOUS at 14:55

## 2018-05-25 RX ADMIN — Medication 975 MILLIGRAM(S): at 14:55

## 2018-05-25 RX ADMIN — Medication 10 MILLIGRAM(S): at 14:55

## 2018-05-25 RX ADMIN — Medication 15 MILLIGRAM(S): at 16:24

## 2018-05-25 NOTE — ED PROVIDER NOTE - PHYSICAL EXAMINATION
VITAL SIGNS: I have reviewed nursing notes and confirm.  CONSTITUTIONAL: Well-developed; well-nourished woman comfortable in stretcher moving all ext speaking clearly in complete sentences; in no acute distress.  SKIN: Agree with RN documentation regarding decubitus evaluation. Remainder of skin exam is warm and dry, no acute rash.  HEAD: Normocephalic; atraumatic.  EYES: PERRL, EOM intact; conjunctiva and sclera clear, OD ocular pressure 20mmHg, OS ocular pressure 13mmHg  ENT: No nasal discharge; airway clear.  NECK: Supple; non tender.  CARD: S1, S2 normal; no murmurs, gallops, or rubs. RRR  RESP: No wheezes, rales or rhonchi.  ABD: Normal bowel sounds; soft; non-distended; non-tender  EXT: Normal ROM. No clubbing, cyanosis or edema.  LYMPH: No acute cervical adenopathy.  NEURO: Alert, oriented. Grossly unremarkable. CN 2-12 intact b/l, 5/5 strength all ext, no sensory deficits, speech intact, gait intact  PSYCH: Cooperative, appropriate.

## 2018-05-25 NOTE — ED PROVIDER NOTE - OBJECTIVE STATEMENT
83 y/o F w/hx CAD, HTN, HLD, chronic headaches (never characterized), and glaucoma, p/w HA upon waking up this morning b/l parietal zones and retro-orbital with photophobia. No n/v, neck pain, fever/chills or recent illness. No trauma. No dizziness or gait instability. Seen at cardiologist's office today, had normal BM/vitals, and was sent to ED for further evaluation. Pt states that she has been to "several doctors" regarding these headaches with no diagnosis. Today's headache is now much improved from when she woke up and is typical of her headaches in the past. Pt attributes going outside and walking around as improving her sx between the cardiologist's office and [facility] ED.

## 2018-05-25 NOTE — ED PROVIDER NOTE - MEDICAL DECISION MAKING DETAILS
HA improving, no ICH seen on CT Head, performed within 6hrs of onset, typical of prior HA's, no electrolyte disturbance or metabolic derangement. Giving pt f/u with HA specialist and return precautions. Ocular pressures wnl. Do not suspect infectious process.

## 2018-05-25 NOTE — ED ADULT NURSE NOTE - OBJECTIVE STATEMENT
Patient arrived to ED via walk-in, AAOx3, in NAD, vitals stable, complaining of 10/10 left to right pain in her head, blurry vision, weakness and photophobia.  Patient denies CP, SOB, double vision, dizziness, N/V/D, fevers, chills, cough, abdominal pain or any other complaints.  PIV placed, labs drawn and sent.  Will continue with plan of care.

## 2018-05-29 DIAGNOSIS — J45.909 UNSPECIFIED ASTHMA, UNCOMPLICATED: ICD-10-CM

## 2018-05-29 DIAGNOSIS — Z79.84 LONG TERM (CURRENT) USE OF ORAL HYPOGLYCEMIC DRUGS: ICD-10-CM

## 2018-05-29 DIAGNOSIS — E78.5 HYPERLIPIDEMIA, UNSPECIFIED: ICD-10-CM

## 2018-05-29 DIAGNOSIS — R51 HEADACHE: ICD-10-CM

## 2018-05-29 DIAGNOSIS — I11.0 HYPERTENSIVE HEART DISEASE WITH HEART FAILURE: ICD-10-CM

## 2018-05-29 DIAGNOSIS — Z88.1 ALLERGY STATUS TO OTHER ANTIBIOTIC AGENTS STATUS: ICD-10-CM

## 2018-05-29 DIAGNOSIS — Z88.8 ALLERGY STATUS TO OTHER DRUGS, MEDICAMENTS AND BIOLOGICAL SUBSTANCES: ICD-10-CM

## 2018-05-29 DIAGNOSIS — I25.10 ATHEROSCLEROTIC HEART DISEASE OF NATIVE CORONARY ARTERY WITHOUT ANGINA PECTORIS: ICD-10-CM

## 2018-05-29 DIAGNOSIS — I50.9 HEART FAILURE, UNSPECIFIED: ICD-10-CM

## 2018-05-29 DIAGNOSIS — Z79.899 OTHER LONG TERM (CURRENT) DRUG THERAPY: ICD-10-CM

## 2018-06-06 ENCOUNTER — APPOINTMENT (OUTPATIENT)
Dept: PHYSICAL MEDICINE AND REHAB | Facility: CLINIC | Age: 83
End: 2018-06-06
Payer: MEDICARE

## 2018-06-06 VITALS — OXYGEN SATURATION: 97 % | HEART RATE: 77 BPM

## 2018-06-06 VITALS — WEIGHT: 114 LBS | HEIGHT: 59 IN | BODY MASS INDEX: 22.98 KG/M2

## 2018-06-06 DIAGNOSIS — M47.817 SPONDYLOSIS W/OUT MYELOPATHY OR RADICULOPATHY, LUMBOSACRAL REGION: ICD-10-CM

## 2018-06-06 PROCEDURE — 99214 OFFICE O/P EST MOD 30 MIN: CPT

## 2018-06-08 PROBLEM — M47.817 SPONDYLOSIS OF LUMBOSACRAL REGION WITHOUT MYELOPATHY OR RADICULOPATHY: Status: ACTIVE | Noted: 2018-03-21

## 2018-07-03 ENCOUNTER — APPOINTMENT (OUTPATIENT)
Dept: ORTHOPEDIC SURGERY | Facility: CLINIC | Age: 83
End: 2018-07-03

## 2018-07-10 ENCOUNTER — APPOINTMENT (OUTPATIENT)
Dept: RHEUMATOLOGY | Facility: CLINIC | Age: 83
End: 2018-07-10
Payer: MEDICARE

## 2018-07-10 VITALS
TEMPERATURE: 98.4 F | OXYGEN SATURATION: 97 % | SYSTOLIC BLOOD PRESSURE: 196 MMHG | HEART RATE: 67 BPM | BODY MASS INDEX: 22.22 KG/M2 | WEIGHT: 110 LBS | DIASTOLIC BLOOD PRESSURE: 65 MMHG

## 2018-07-10 DIAGNOSIS — M19.90 UNSPECIFIED OSTEOARTHRITIS, UNSPECIFIED SITE: ICD-10-CM

## 2018-07-10 PROCEDURE — 99214 OFFICE O/P EST MOD 30 MIN: CPT

## 2018-07-19 ENCOUNTER — APPOINTMENT (OUTPATIENT)
Dept: GERIATRICS | Facility: CLINIC | Age: 83
End: 2018-07-19
Payer: MEDICARE

## 2018-07-19 VITALS
HEART RATE: 66 BPM | SYSTOLIC BLOOD PRESSURE: 155 MMHG | WEIGHT: 110 LBS | OXYGEN SATURATION: 98 % | DIASTOLIC BLOOD PRESSURE: 72 MMHG | BODY MASS INDEX: 22.22 KG/M2

## 2018-07-19 DIAGNOSIS — Z60.2 PROBLEMS RELATED TO LIVING ALONE: ICD-10-CM

## 2018-07-19 DIAGNOSIS — R63.4 ABNORMAL WEIGHT LOSS: ICD-10-CM

## 2018-07-19 DIAGNOSIS — F32.9 MAJOR DEPRESSIVE DISORDER, SINGLE EPISODE, UNSPECIFIED: ICD-10-CM

## 2018-07-19 PROCEDURE — 99497 ADVNCD CARE PLAN 30 MIN: CPT

## 2018-07-19 PROCEDURE — 99213 OFFICE O/P EST LOW 20 MIN: CPT

## 2018-07-19 PROCEDURE — 99214 OFFICE O/P EST MOD 30 MIN: CPT | Mod: 25

## 2018-07-19 RX ORDER — COLCHICINE 0.6 MG/1
0.6 TABLET ORAL DAILY
Qty: 30 | Refills: 3 | Status: DISCONTINUED | COMMUNITY
Start: 2018-05-08 | End: 2018-07-19

## 2018-07-19 SDOH — SOCIAL STABILITY - SOCIAL INSECURITY: PROBLEMS RELATED TO LIVING ALONE: Z60.2

## 2018-07-23 PROBLEM — Z60.2 LIVING ALONE: Status: ACTIVE | Noted: 2017-02-17

## 2018-08-09 ENCOUNTER — APPOINTMENT (OUTPATIENT)
Dept: RHEUMATOLOGY | Facility: CLINIC | Age: 83
End: 2018-08-09

## 2018-08-15 ENCOUNTER — FORM ENCOUNTER (OUTPATIENT)
Age: 83
End: 2018-08-15

## 2018-08-16 ENCOUNTER — APPOINTMENT (OUTPATIENT)
Dept: ULTRASOUND IMAGING | Facility: HOSPITAL | Age: 83
End: 2018-08-16

## 2018-08-16 ENCOUNTER — OUTPATIENT (OUTPATIENT)
Dept: OUTPATIENT SERVICES | Facility: HOSPITAL | Age: 83
LOS: 1 days | End: 2018-08-16
Payer: MEDICARE

## 2018-08-16 DIAGNOSIS — Z95.5 PRESENCE OF CORONARY ANGIOPLASTY IMPLANT AND GRAFT: Chronic | ICD-10-CM

## 2018-08-16 PROBLEM — I50.32 CHRONIC DIASTOLIC (CONGESTIVE) HEART FAILURE: Chronic | Status: ACTIVE | Noted: 2017-07-27

## 2018-08-16 PROCEDURE — 93970 EXTREMITY STUDY: CPT

## 2018-08-16 PROCEDURE — 93970 EXTREMITY STUDY: CPT | Mod: 26

## 2018-08-21 ENCOUNTER — APPOINTMENT (OUTPATIENT)
Dept: ENDOCRINOLOGY | Facility: CLINIC | Age: 83
End: 2018-08-21

## 2018-10-18 ENCOUNTER — APPOINTMENT (OUTPATIENT)
Dept: RHEUMATOLOGY | Facility: CLINIC | Age: 83
End: 2018-10-18

## 2018-10-22 ENCOUNTER — OUTPATIENT (OUTPATIENT)
Dept: OUTPATIENT SERVICES | Facility: HOSPITAL | Age: 83
LOS: 1 days | End: 2018-10-22
Payer: MEDICARE

## 2018-10-22 ENCOUNTER — APPOINTMENT (OUTPATIENT)
Dept: ULTRASOUND IMAGING | Facility: HOSPITAL | Age: 83
End: 2018-10-22
Payer: MEDICARE

## 2018-10-22 DIAGNOSIS — Z95.5 PRESENCE OF CORONARY ANGIOPLASTY IMPLANT AND GRAFT: Chronic | ICD-10-CM

## 2018-10-22 PROCEDURE — 77080 DXA BONE DENSITY AXIAL: CPT

## 2018-10-22 PROCEDURE — 76830 TRANSVAGINAL US NON-OB: CPT

## 2018-10-22 PROCEDURE — 76830 TRANSVAGINAL US NON-OB: CPT | Mod: 26

## 2018-10-22 PROCEDURE — 76856 US EXAM PELVIC COMPLETE: CPT | Mod: 26

## 2018-10-22 PROCEDURE — 76856 US EXAM PELVIC COMPLETE: CPT

## 2018-10-22 PROCEDURE — 77080 DXA BONE DENSITY AXIAL: CPT | Mod: 26

## 2018-10-30 ENCOUNTER — APPOINTMENT (OUTPATIENT)
Dept: RHEUMATOLOGY | Facility: CLINIC | Age: 83
End: 2018-10-30

## 2018-11-12 ENCOUNTER — APPOINTMENT (OUTPATIENT)
Dept: ENDOCRINOLOGY | Facility: CLINIC | Age: 83
End: 2018-11-12

## 2018-11-20 ENCOUNTER — APPOINTMENT (OUTPATIENT)
Dept: RHEUMATOLOGY | Facility: CLINIC | Age: 83
End: 2018-11-20

## 2018-12-04 ENCOUNTER — APPOINTMENT (OUTPATIENT)
Dept: ENDOCRINOLOGY | Facility: CLINIC | Age: 83
End: 2018-12-04

## 2018-12-10 ENCOUNTER — APPOINTMENT (OUTPATIENT)
Dept: ENDOCRINOLOGY | Facility: CLINIC | Age: 83
End: 2018-12-10
Payer: MEDICARE

## 2018-12-10 VITALS
SYSTOLIC BLOOD PRESSURE: 176 MMHG | HEIGHT: 59 IN | BODY MASS INDEX: 22.18 KG/M2 | WEIGHT: 110 LBS | DIASTOLIC BLOOD PRESSURE: 84 MMHG | HEART RATE: 69 BPM

## 2018-12-10 PROCEDURE — 99213 OFFICE O/P EST LOW 20 MIN: CPT

## 2018-12-18 ENCOUNTER — LABORATORY RESULT (OUTPATIENT)
Age: 83
End: 2018-12-18

## 2018-12-20 ENCOUNTER — APPOINTMENT (OUTPATIENT)
Dept: NEPHROLOGY | Facility: CLINIC | Age: 83
End: 2018-12-20
Payer: MEDICARE

## 2018-12-20 VITALS — WEIGHT: 110 LBS | BODY MASS INDEX: 22.22 KG/M2

## 2018-12-20 VITALS — HEART RATE: 68 BPM | SYSTOLIC BLOOD PRESSURE: 160 MMHG | DIASTOLIC BLOOD PRESSURE: 80 MMHG

## 2018-12-20 DIAGNOSIS — R10.84 GENERALIZED ABDOMINAL PAIN: ICD-10-CM

## 2018-12-20 LAB
CREAT SPEC-SCNC: 172 MG/DL
HBA1C MFR BLD HPLC: 5.8
MICROALBUMIN 24H UR DL<=1MG/L-MCNC: 3.5 MG/DL
MICROALBUMIN/CREAT 24H UR-RTO: 20 MG/G

## 2018-12-20 PROCEDURE — 99214 OFFICE O/P EST MOD 30 MIN: CPT

## 2018-12-20 RX ORDER — TRAVOPROST 0.04 MG/ML
0 SOLUTION/ DROPS OPHTHALMIC
Qty: 1 | Refills: 5 | Status: DISCONTINUED | COMMUNITY
Start: 2017-12-14 | End: 2018-12-20

## 2018-12-20 RX ORDER — TRAMADOL HYDROCHLORIDE AND ACETAMINOPHEN 37.5; 325 MG/1; MG/1
37.5-325 TABLET, FILM COATED ORAL
Qty: 60 | Refills: 0 | Status: DISCONTINUED | COMMUNITY
Start: 2018-04-30 | End: 2018-12-20

## 2019-01-10 ENCOUNTER — APPOINTMENT (OUTPATIENT)
Dept: RADIOLOGY | Facility: HOSPITAL | Age: 84
End: 2019-01-10

## 2019-04-17 ENCOUNTER — APPOINTMENT (OUTPATIENT)
Dept: NEPHROLOGY | Facility: CLINIC | Age: 84
End: 2019-04-17
Payer: MEDICARE

## 2019-04-17 VITALS
WEIGHT: 113 LBS | DIASTOLIC BLOOD PRESSURE: 80 MMHG | BODY MASS INDEX: 22.82 KG/M2 | HEART RATE: 70 BPM | SYSTOLIC BLOOD PRESSURE: 138 MMHG

## 2019-04-17 PROCEDURE — 99214 OFFICE O/P EST MOD 30 MIN: CPT

## 2019-04-17 RX ORDER — MIRTAZAPINE 15 MG/1
15 TABLET, FILM COATED ORAL
Qty: 30 | Refills: 1 | Status: DISCONTINUED | COMMUNITY
Start: 2018-07-19 | End: 2019-04-17

## 2019-04-17 NOTE — PHYSICAL EXAM
[General Appearance - Alert] : alert [Outer Ear] : the ears and nose were normal in appearance [Sclera] : the sclera and conjunctiva were normal [Examination Of The Oral Cavity] : the lips and gums were normal [Neck Cervical Mass (___cm)] : no neck mass was observed [Heart Rate And Rhythm] : heart rate was normal and rhythm regular [] : no respiratory distress [Auscultation Breath Sounds / Voice Sounds] : lungs were clear to auscultation bilaterally [Heart Sounds] : normal S1 and S2 [Heart Sounds Gallop] : no gallops [Heart Sounds Pericardial Friction Rub] : no pericardial rub [Murmurs] : no murmurs [Cervical Lymph Nodes Enlarged Posterior Bilaterally] : posterior cervical [Supraclavicular Lymph Nodes Enlarged Bilaterally] : supraclavicular [Cervical Lymph Nodes Enlarged Anterior Bilaterally] : anterior cervical [No CVA Tenderness] : no ~M costovertebral angle tenderness [No Spinal Tenderness] : no spinal tenderness [Involuntary Movements] : no involuntary movements were seen [Skin Color & Pigmentation] : normal skin color and pigmentation [Oriented To Time, Place, And Person] : oriented to person, place, and time [No Focal Deficits] : no focal deficits [Impaired Insight] : insight and judgment were intact [Affect] : the affect was normal [FreeTextEntry1] : 1+ lower leg edema

## 2019-04-17 NOTE — HISTORY OF PRESENT ILLNESS
[FreeTextEntry1] : 82 yo woman here for f/u evaluation of  HTN, CKD, CAD, DMT2, hyperlipidemia.\par reports that she is still suffering from her frequent BPs, periumbilical discomfort and urine frequency\par weight stabilized- but still lower than prior baseline appetite okay- not robust- thinks it is a bit less than last OV\par FROM LAST OV:Had colonoscopy 10/19/2018 + diverticular disease\par + microhematuria hematuria, \par From her paperwork- to undergo urodynamics  soon at [facility]-\par also for MRI brain possible "ministrokes" or early dementia\par Denies flank pain, dysuria, hematuria or frothy urine \par no SOB, DELUCA\par \par PMH: Has 4 cardiac and 4 left leg and 3 right leg stents

## 2019-04-17 NOTE — REVIEW OF SYSTEMS
[Eyesight Problems] : eyesight problems [Feeling Tired] : feeling tired [As Noted in HPI] : as noted in HPI [Arthralgias] : arthralgias [Joint Pain] : joint pain [Negative] : Psychiatric [FreeTextEntry8] : increased creatinine

## 2019-04-17 NOTE — ASSESSMENT
[FreeTextEntry1] : rediscussed labs from 12/18/2018- reports that she had new recently with Dr. Billings- will obtain\par \par 82 yo woman with CKD 3, HTN, LE edema- ongoing GI and urine frequency/incontinence issues- \par for  followup as above\par -CKD 3- clinically stable- some variability of creat may be related to bladder issues- await  findings\par - LE edema- advised that it is okay to use extra lasix once weekly as needed if edema worsens\par -hematuria- for  follow up\par -proteinuria- still need to quantify- will try to leave sample today\par  send serologies if  hematuria and proteinuria persist\par -CKD 3-  concern that  issues contributing to azotemia- for  f/u as above\par \par f/u 6 months

## 2019-05-13 ENCOUNTER — APPOINTMENT (OUTPATIENT)
Dept: ENDOCRINOLOGY | Facility: CLINIC | Age: 84
End: 2019-05-13
Payer: SELF-PAY

## 2019-05-13 PROCEDURE — SNS01: CPT

## 2019-06-26 ENCOUNTER — APPOINTMENT (OUTPATIENT)
Dept: ENDOCRINOLOGY | Facility: CLINIC | Age: 84
End: 2019-06-26

## 2019-06-26 ENCOUNTER — INPATIENT (INPATIENT)
Facility: HOSPITAL | Age: 84
LOS: 2 days | Discharge: HOME HEALTH | DRG: 247 | End: 2019-06-29
Attending: INTERNAL MEDICINE | Admitting: INTERNAL MEDICINE
Payer: MEDICARE

## 2019-06-26 ENCOUNTER — APPOINTMENT (OUTPATIENT)
Dept: HEART AND VASCULAR | Facility: CLINIC | Age: 84
End: 2019-06-26
Payer: MEDICARE

## 2019-06-26 VITALS
DIASTOLIC BLOOD PRESSURE: 77 MMHG | OXYGEN SATURATION: 97 % | SYSTOLIC BLOOD PRESSURE: 164 MMHG | RESPIRATION RATE: 16 BRPM | HEART RATE: 67 BPM | TEMPERATURE: 98 F

## 2019-06-26 VITALS
HEIGHT: 59 IN | DIASTOLIC BLOOD PRESSURE: 96 MMHG | HEART RATE: 74 BPM | SYSTOLIC BLOOD PRESSURE: 174 MMHG | BODY MASS INDEX: 22.18 KG/M2 | WEIGHT: 110 LBS

## 2019-06-26 DIAGNOSIS — R73.03 PREDIABETES: ICD-10-CM

## 2019-06-26 DIAGNOSIS — Z95.5 PRESENCE OF CORONARY ANGIOPLASTY IMPLANT AND GRAFT: Chronic | ICD-10-CM

## 2019-06-26 DIAGNOSIS — E78.5 HYPERLIPIDEMIA, UNSPECIFIED: ICD-10-CM

## 2019-06-26 DIAGNOSIS — I21.4 NON-ST ELEVATION (NSTEMI) MYOCARDIAL INFARCTION: ICD-10-CM

## 2019-06-26 DIAGNOSIS — E04.1 NONTOXIC SINGLE THYROID NODULE: ICD-10-CM

## 2019-06-26 DIAGNOSIS — I50.32 CHRONIC DIASTOLIC (CONGESTIVE) HEART FAILURE: ICD-10-CM

## 2019-06-26 DIAGNOSIS — I99.8 OTHER DISORDER OF CIRCULATORY SYSTEM: ICD-10-CM

## 2019-06-26 DIAGNOSIS — Z00.00 ENCOUNTER FOR GENERAL ADULT MEDICAL EXAMINATION WITHOUT ABNORMAL FINDINGS: ICD-10-CM

## 2019-06-26 DIAGNOSIS — Z91.89 OTHER SPECIFIED PERSONAL RISK FACTORS, NOT ELSEWHERE CLASSIFIED: ICD-10-CM

## 2019-06-26 DIAGNOSIS — I10 ESSENTIAL (PRIMARY) HYPERTENSION: ICD-10-CM

## 2019-06-26 DIAGNOSIS — I25.10 ATHEROSCLEROTIC HEART DISEASE OF NATIVE CORONARY ARTERY WITHOUT ANGINA PECTORIS: ICD-10-CM

## 2019-06-26 LAB
ALBUMIN SERPL ELPH-MCNC: 4.2 G/DL — SIGNIFICANT CHANGE UP (ref 3.3–5)
ALP SERPL-CCNC: 78 U/L — SIGNIFICANT CHANGE UP (ref 40–120)
ALT FLD-CCNC: 20 U/L — SIGNIFICANT CHANGE UP (ref 10–45)
ANION GAP SERPL CALC-SCNC: 10 MMOL/L — SIGNIFICANT CHANGE UP (ref 5–17)
APTT BLD: 33.4 SEC — SIGNIFICANT CHANGE UP (ref 27.5–36.3)
APTT BLD: >200 SEC — CRITICAL HIGH (ref 27.5–36.3)
AST SERPL-CCNC: 29 U/L — SIGNIFICANT CHANGE UP (ref 10–40)
BASOPHILS # BLD AUTO: 0.03 K/UL — SIGNIFICANT CHANGE UP (ref 0–0.2)
BASOPHILS NFR BLD AUTO: 0.4 % — SIGNIFICANT CHANGE UP (ref 0–2)
BILIRUB SERPL-MCNC: 0.5 MG/DL — SIGNIFICANT CHANGE UP (ref 0.2–1.2)
BUN SERPL-MCNC: 17 MG/DL — SIGNIFICANT CHANGE UP (ref 7–23)
CALCIUM SERPL-MCNC: 9.9 MG/DL — SIGNIFICANT CHANGE UP (ref 8.4–10.5)
CHLORIDE SERPL-SCNC: 103 MMOL/L — SIGNIFICANT CHANGE UP (ref 96–108)
CK MB CFR SERPL CALC: 5 NG/ML — SIGNIFICANT CHANGE UP (ref 0–6.7)
CK MB CFR SERPL CALC: 6.7 NG/ML — SIGNIFICANT CHANGE UP (ref 0–6.7)
CK MB CFR SERPL CALC: 7.2 NG/ML — HIGH (ref 0–6.7)
CK SERPL-CCNC: 103 U/L — SIGNIFICANT CHANGE UP (ref 25–170)
CK SERPL-CCNC: 124 U/L — SIGNIFICANT CHANGE UP (ref 25–170)
CO2 SERPL-SCNC: 27 MMOL/L — SIGNIFICANT CHANGE UP (ref 22–31)
CREAT SERPL-MCNC: 0.98 MG/DL — SIGNIFICANT CHANGE UP (ref 0.5–1.3)
EOSINOPHIL # BLD AUTO: 0.09 K/UL — SIGNIFICANT CHANGE UP (ref 0–0.5)
EOSINOPHIL NFR BLD AUTO: 1.2 % — SIGNIFICANT CHANGE UP (ref 0–6)
GLUCOSE SERPL-MCNC: 99 MG/DL — SIGNIFICANT CHANGE UP (ref 70–99)
HCT VFR BLD CALC: 41.8 % — SIGNIFICANT CHANGE UP (ref 34.5–45)
HGB BLD-MCNC: 13.4 G/DL — SIGNIFICANT CHANGE UP (ref 11.5–15.5)
IMM GRANULOCYTES NFR BLD AUTO: 0.3 % — SIGNIFICANT CHANGE UP (ref 0–1.5)
INR BLD: 1.01 — SIGNIFICANT CHANGE UP (ref 0.88–1.16)
LYMPHOCYTES # BLD AUTO: 1.82 K/UL — SIGNIFICANT CHANGE UP (ref 1–3.3)
LYMPHOCYTES # BLD AUTO: 23.7 % — SIGNIFICANT CHANGE UP (ref 13–44)
MCHC RBC-ENTMCNC: 29.1 PG — SIGNIFICANT CHANGE UP (ref 27–34)
MCHC RBC-ENTMCNC: 32.1 GM/DL — SIGNIFICANT CHANGE UP (ref 32–36)
MCV RBC AUTO: 90.7 FL — SIGNIFICANT CHANGE UP (ref 80–100)
MONOCYTES # BLD AUTO: 0.56 K/UL — SIGNIFICANT CHANGE UP (ref 0–0.9)
MONOCYTES NFR BLD AUTO: 7.3 % — SIGNIFICANT CHANGE UP (ref 2–14)
NEUTROPHILS # BLD AUTO: 5.17 K/UL — SIGNIFICANT CHANGE UP (ref 1.8–7.4)
NEUTROPHILS NFR BLD AUTO: 67.1 % — SIGNIFICANT CHANGE UP (ref 43–77)
NRBC # BLD: 0 /100 WBCS — SIGNIFICANT CHANGE UP (ref 0–0)
NT-PROBNP SERPL-SCNC: 1810 PG/ML — HIGH (ref 0–300)
PLATELET # BLD AUTO: 192 K/UL — SIGNIFICANT CHANGE UP (ref 150–400)
POTASSIUM SERPL-MCNC: 4.2 MMOL/L — SIGNIFICANT CHANGE UP (ref 3.5–5.3)
POTASSIUM SERPL-SCNC: 4.2 MMOL/L — SIGNIFICANT CHANGE UP (ref 3.5–5.3)
PROT SERPL-MCNC: 7.9 G/DL — SIGNIFICANT CHANGE UP (ref 6–8.3)
PROTHROM AB SERPL-ACNC: 11.4 SEC — SIGNIFICANT CHANGE UP (ref 10–12.9)
RBC # BLD: 4.61 M/UL — SIGNIFICANT CHANGE UP (ref 3.8–5.2)
RBC # FLD: 13.3 % — SIGNIFICANT CHANGE UP (ref 10.3–14.5)
SODIUM SERPL-SCNC: 140 MMOL/L — SIGNIFICANT CHANGE UP (ref 135–145)
TROPONIN T SERPL-MCNC: 0.04 NG/ML — CRITICAL HIGH (ref 0–0.01)
TROPONIN T SERPL-MCNC: 0.04 NG/ML — CRITICAL HIGH (ref 0–0.01)
TROPONIN T SERPL-MCNC: 0.05 NG/ML — CRITICAL HIGH (ref 0–0.01)
WBC # BLD: 7.69 K/UL — SIGNIFICANT CHANGE UP (ref 3.8–10.5)
WBC # FLD AUTO: 7.69 K/UL — SIGNIFICANT CHANGE UP (ref 3.8–10.5)

## 2019-06-26 PROCEDURE — 93000 ELECTROCARDIOGRAM COMPLETE: CPT

## 2019-06-26 PROCEDURE — 99285 EMERGENCY DEPT VISIT HI MDM: CPT

## 2019-06-26 PROCEDURE — 99204 OFFICE O/P NEW MOD 45 MIN: CPT | Mod: 25

## 2019-06-26 PROCEDURE — 99223 1ST HOSP IP/OBS HIGH 75: CPT | Mod: AI

## 2019-06-26 PROCEDURE — 71046 X-RAY EXAM CHEST 2 VIEWS: CPT | Mod: 26

## 2019-06-26 PROCEDURE — 93306 TTE W/DOPPLER COMPLETE: CPT

## 2019-06-26 PROCEDURE — ZZZZZ: CPT

## 2019-06-26 RX ORDER — CEFAZOLIN SODIUM 1 G
1000 VIAL (EA) INJECTION EVERY 12 HOURS
Refills: 0 | Status: DISCONTINUED | OUTPATIENT
Start: 2019-06-26 | End: 2019-06-29

## 2019-06-26 RX ORDER — ASPIRIN/CALCIUM CARB/MAGNESIUM 324 MG
81 TABLET ORAL DAILY
Refills: 0 | Status: DISCONTINUED | OUTPATIENT
Start: 2019-06-27 | End: 2019-06-29

## 2019-06-26 RX ORDER — KETOROLAC TROMETHAMINE 0.5 %
1 DROPS OPHTHALMIC (EYE)
Refills: 0 | Status: DISCONTINUED | OUTPATIENT
Start: 2019-06-26 | End: 2019-06-26

## 2019-06-26 RX ORDER — CLOPIDOGREL BISULFATE 75 MG/1
300 TABLET, FILM COATED ORAL ONCE
Refills: 0 | Status: DISCONTINUED | OUTPATIENT
Start: 2019-06-26 | End: 2019-06-26

## 2019-06-26 RX ORDER — CITALOPRAM 10 MG/1
10 TABLET, FILM COATED ORAL DAILY
Refills: 0 | Status: DISCONTINUED | OUTPATIENT
Start: 2019-06-26 | End: 2019-06-26

## 2019-06-26 RX ORDER — ASPIRIN/CALCIUM CARB/MAGNESIUM 324 MG
325 TABLET ORAL ONCE
Refills: 0 | Status: COMPLETED | OUTPATIENT
Start: 2019-06-26 | End: 2019-06-26

## 2019-06-26 RX ORDER — FOLIC ACID 0.8 MG
1 TABLET ORAL DAILY
Refills: 0 | Status: DISCONTINUED | OUTPATIENT
Start: 2019-06-26 | End: 2019-06-29

## 2019-06-26 RX ORDER — CLOPIDOGREL BISULFATE 75 MG/1
600 TABLET, FILM COATED ORAL ONCE
Refills: 0 | Status: COMPLETED | OUTPATIENT
Start: 2019-06-26 | End: 2019-06-26

## 2019-06-26 RX ORDER — CLOPIDOGREL BISULFATE 75 MG/1
75 TABLET, FILM COATED ORAL DAILY
Refills: 0 | Status: DISCONTINUED | OUTPATIENT
Start: 2019-06-27 | End: 2019-06-29

## 2019-06-26 RX ORDER — HEPARIN SODIUM 5000 [USP'U]/ML
450 INJECTION INTRAVENOUS; SUBCUTANEOUS
Qty: 25000 | Refills: 0 | Status: DISCONTINUED | OUTPATIENT
Start: 2019-06-26 | End: 2019-06-27

## 2019-06-26 RX ORDER — LISINOPRIL 2.5 MG/1
20 TABLET ORAL EVERY 24 HOURS
Refills: 0 | Status: DISCONTINUED | OUTPATIENT
Start: 2019-06-26 | End: 2019-06-29

## 2019-06-26 RX ORDER — ATORVASTATIN CALCIUM 80 MG/1
80 TABLET, FILM COATED ORAL AT BEDTIME
Refills: 0 | Status: DISCONTINUED | OUTPATIENT
Start: 2019-06-26 | End: 2019-06-29

## 2019-06-26 RX ORDER — HEPARIN SODIUM 5000 [USP'U]/ML
2900 INJECTION INTRAVENOUS; SUBCUTANEOUS ONCE
Refills: 0 | Status: COMPLETED | OUTPATIENT
Start: 2019-06-26 | End: 2019-06-26

## 2019-06-26 RX ORDER — HEPARIN SODIUM 5000 [USP'U]/ML
INJECTION INTRAVENOUS; SUBCUTANEOUS
Qty: 25000 | Refills: 0 | Status: DISCONTINUED | OUTPATIENT
Start: 2019-06-26 | End: 2019-06-26

## 2019-06-26 RX ORDER — PANTOPRAZOLE SODIUM 20 MG/1
40 TABLET, DELAYED RELEASE ORAL
Refills: 0 | Status: DISCONTINUED | OUTPATIENT
Start: 2019-06-26 | End: 2019-06-29

## 2019-06-26 RX ORDER — METOPROLOL TARTRATE 50 MG
25 TABLET ORAL EVERY 12 HOURS
Refills: 0 | Status: DISCONTINUED | OUTPATIENT
Start: 2019-06-26 | End: 2019-06-28

## 2019-06-26 RX ADMIN — Medication 325 MILLIGRAM(S): at 12:52

## 2019-06-26 RX ADMIN — ATORVASTATIN CALCIUM 80 MILLIGRAM(S): 80 TABLET, FILM COATED ORAL at 21:53

## 2019-06-26 RX ADMIN — Medication 25 MILLIGRAM(S): at 16:21

## 2019-06-26 RX ADMIN — CLOPIDOGREL BISULFATE 600 MILLIGRAM(S): 75 TABLET, FILM COATED ORAL at 13:50

## 2019-06-26 RX ADMIN — HEPARIN SODIUM 600 UNIT(S)/HR: 5000 INJECTION INTRAVENOUS; SUBCUTANEOUS at 16:36

## 2019-06-26 RX ADMIN — HEPARIN SODIUM 4.5 UNIT(S)/HR: 5000 INJECTION INTRAVENOUS; SUBCUTANEOUS at 22:01

## 2019-06-26 RX ADMIN — Medication 1 DROP(S): at 21:54

## 2019-06-26 RX ADMIN — HEPARIN SODIUM 2900 UNIT(S): 5000 INJECTION INTRAVENOUS; SUBCUTANEOUS at 16:32

## 2019-06-26 RX ADMIN — LISINOPRIL 20 MILLIGRAM(S): 2.5 TABLET ORAL at 16:21

## 2019-06-26 NOTE — ED ADULT NURSE NOTE - NSIMPLEMENTINTERV_GEN_ALL_ED
Implemented All Fall with Harm Risk Interventions:  [place] to call system. Call bell, personal items and telephone within reach. Instruct patient to call for assistance. Room bathroom lighting operational. Non-slip footwear when patient is off stretcher. Physically safe environment: no spills, clutter or unnecessary equipment. Stretcher in lowest position, wheels locked, appropriate side rails in place. Provide visual cue, wrist band, yellow gown, etc. Monitor gait and stability. Monitor for mental status changes and reorient to person, place, and time. Review medications for side effects contributing to fall risk. Reinforce activity limits and safety measures with patient and family. Provide visual clues: red socks.

## 2019-06-26 NOTE — CONSULT NOTE ADULT - ASSESSMENT
83F with PMH of CAD (s/p CABG 2003) and multiple PCIs with stents, peripheral vascular disease (multiple stents in b/l LEs), CHF, HTN, HLD, Gout, admitted to the hospital for an NSTEMI, with concern for ischemic leg.    Recommendations:  -INCOMPLETE- 83F with PMH of CAD (s/p CABG 2003) and multiple PCIs with stents, peripheral vascular disease (multiple stents in b/l LEs), CHF, HTN, HLD, Gout, admitted to the hospital for an NSTEMI, with concern for ischemic leg.    Recommendations:  - No acute vascular surgery intervention at this time, as patient's leg is not ischemic, palpable pulses  - Recommend IV abx of choice for RLE cellulitis  - Recommend Dermatology consult for biopsy of RLE mole  - Seen and examined with Chief Resident  - Please call [phone] with questions

## 2019-06-26 NOTE — H&P ADULT - PROBLEM SELECTOR PLAN 3
- RLE tender to palpation out of proportion. - RLE tender to palpation out of proportion + "funny feeling in leg"   - color of RLE is slightly different. More erythematous, not mottled  - 2+ peripheral pulses B/L, warm to palpation,   - 2/6 - RLE tender to palpation out of proportion + "funny feeling in leg"   - color of RLE is slightly different. More erythematous, not mottled  - 2+ peripheral pulses B/L, warm to palpation,   - 2/6 classic P's risk factors  - vascular consult  - arterial doppler LE b/l    # Black skin lesion  - pt states she was supposed to get it biopsed  - concerning for melenoma  - can be workup up outpatient

## 2019-06-26 NOTE — H&P ADULT - PROBLEM SELECTOR PLAN 8
- h/o 3 thyroid nodules, pt reports good follow up. Was getting trended imaging to monitor size of nodules.

## 2019-06-26 NOTE — PHYSICAL EXAM
[Alert] : alert [Normal Sclera/Conjunctiva] : normal sclera/conjunctiva [Normal Outer Ear/Nose] : the ears and nose were normal in appearance [No Respiratory Distress] : no respiratory distress [Normal PMI] : the apical impulse was normal [Normal Bowel Sounds] : normal bowel sounds [Kyphosis] : kyphosis present [No Stigmata of Cushings Syndrome] : no stigmata of cushings syndrome [Cranial Nerves Intact] : cranial nerves 2-12 were intact [Oriented x3] : oriented to person, place, and time [de-identified] : thyroid nodules

## 2019-06-26 NOTE — H&P ADULT - NSHPLABSRESULTS_GEN_ALL_CORE
13.4   7.69  )-----------( 192      ( 26 Jun 2019 12:03 )             41.8       06-26    140  |  103  |  17  ----------------------------<  99  4.2   |  27  |  0.98    Ca    9.9      26 Jun 2019 12:03    TPro  7.9  /  Alb  4.2  /  TBili  0.5  /  DBili  x   /  AST  29  /  ALT  20  /  AlkPhos  78  06-26                  PT/INR - ( 26 Jun 2019 12:03 )   PT: 11.4 sec;   INR: 1.01          PTT - ( 26 Jun 2019 12:03 )  PTT:33.4 sec    Lactate Trend      CARDIAC MARKERS ( 26 Jun 2019 12:03 )  x     / 0.05 ng/mL / 144 U/L / x     / 7.2 ng/mL        CAPILLARY BLOOD GLUCOSE

## 2019-06-26 NOTE — H&P ADULT - PROBLEM SELECTOR PLAN 2
- s/p multiple MI's, PCI's w/ stents and CABG 2003  - c/w aspirin 81, plavix 75, lipitor 80, isosorbide 30 PO daily, quinapril 20

## 2019-06-26 NOTE — HISTORY OF PRESENT ILLNESS
[FreeTextEntry1] : 8/22/17, A1c 6%, ldl-c 152, serum creatinine 1.09\par 10/2017 A1c 6%\par 12/10/18 POCT A1c 5.8%\par 12/18/18 iPTH 79, Ca 10.1, cholesterol 257, LDL-c 167, s. creat 1.08, Vit D 25-OH 22, \par 2/8/19 vit b12 753, s. creat 1.15, cholesterol 212, LDL-c 138, Vit D 25-OH 25.1, \par \par 84 y/o F pt, /96, BMI 22.22, with Hx of T2DM (dx in 2016) and DM related complications of: peripheral neuropathy, CAD- cardiac stent (in 2017)\par Significant PMHx: thyroid nodules with negative FNA biopsy.\par Other PMHx: hypercholesterolemia, \par Follows with nephrologist, rheumatologist, cardiologist.\par Referred to see psychiatrist for depression and forgetfulness\par \par 6/26/19 \par Today pt presents for endocrine f/u, \par \par \par \par Current Medications: Metformin (was d/c in 2017), Statins 40 mg daily

## 2019-06-26 NOTE — REVIEW OF SYSTEMS
[Decreased Appetite] : ~L decreased appetite [Back Pain] : back pain [Negative] : Endocrine [Palpitations] : no palpitations [Chest Pain] : no chest pain [Joint Pain] : no joint pain

## 2019-06-26 NOTE — ED PROVIDER NOTE - CLINICAL SUMMARY MEDICAL DECISION MAKING FREE TEXT BOX
82 yo F with pmh of HTN, HLD, DM, asthma, CHF, glaucoma, CAD s/p CABG 2003 and stents c/o midsternal sharp chest pain that started around 10am. Lasted seconds and resolved. EKG NSR @ 72, TWI V4-V6. Cardio rrr, nml s1s2. Lungs cta b/l. +b/l LE 2+ pitting edema. r/o acs r/o chf

## 2019-06-26 NOTE — END OF VISIT
[FreeTextEntry3] : All medical record entries made by the Scribe were at my, Dr. Denton Mckenzie, direction and personally dictated by me on 06/26/2019. I have reviewed the chart and agree that the record accurately reflects my personal performance of the history, physical exam, assessment and plan. I have also personally directed, reviewed and agreed with the chart.\par

## 2019-06-26 NOTE — H&P ADULT - PROBLEM SELECTOR PLAN 9
.  F: none  E: PRN  N: DASH fluid limit 1.5  GI: PPI  DVT: hep ggt  glucose: ISS  bowel: none  dispo: 5L  code: full

## 2019-06-26 NOTE — DISCUSSION/SUMMARY
[FreeTextEntry1] : EKG: NSR  74/min, LAE, VCP, lateral ischemia, ASWMI (chr)\par ECHO: LV EF 25-30%, global hypokinesis, moderate MR, TR, mild AR, calcified AV, no stenosis\par \par 1. CAD - extensive CAD hx with worsening sx and non-compliance on meds. Pt ref to ED by EMS for possible unstable angina and need for invasive work up.\par I emphasize to pt need for compliance on medications.\par 2. CKD - f/u with nephrology\par 3. DM - f/u with endo.\par 4. f/u with Dr. Billings after hospitalization\par

## 2019-06-26 NOTE — ED PROVIDER NOTE - PHYSICAL EXAMINATION
CONSTITUTIONAL: Well-appearing; well-nourished; in no apparent distress.   HEAD: Normocephalic; atraumatic.   EYES: PERRL; EOM intact; conjunctiva and sclera clear  ENT: normal nose; no rhinorrhea; normal pharynx with no erythema or lesions.   NECK: Supple; non-tender; no LAD  CARDIOVASCULAR: Normal S1, S2; no murmurs, rubs, or gallops. Regular rate and rhythm.   RESPIRATORY: Breathing easily; breath sounds clear and equal bilaterally; no wheezes, rhonchi, or rales.  GI: Soft; non-distended; non-tender; no palpable organomegaly.   MSK: FROM at all extremities, normal tone   EXT: 2+ b/l pitting edema and tenderness   SKIN: Normal for age and race; warm; dry; good turgor; no apparent lesions or rash.   NEURO: A & O x 3; face symmetric; grossly unremarkable.   PSYCHOLOGICAL: The patient’s mood and manner are appropriate.

## 2019-06-26 NOTE — PATIENT PROFILE ADULT - NSPROEDALEARNPREF_GEN_A_NUR
group instruction/individual instruction/verbal instruction/audio/pictorial/skill demonstration/computer/internet/written material/video

## 2019-06-26 NOTE — H&P ADULT - NSHPPHYSICALEXAM_GEN_ALL_CORE
PHYSICAL EXAM:  GENERAL: NAD, well-developed  HEAD:  Atraumatic, Normocephalic  EYES: EOMI, PERRLA, conjunctiva and sclera clear  NECK: Supple, No JVD  CHEST/LUNG: Clear to auscultation bilaterally; No wheeze  HEART: Regular rate and rhythm; No murmurs, rubs, or gallops  ABDOMEN: Soft, Nontender, Nondistended; Bowel sounds present  EXTREMITIES:, RLE pain out of proportion, erythematous. 2+ peripheral pulse. + LE edema  PSYCH: AAOx3  NEUROLOGY: non-focal  SKIN: No rashes or lesions PHYSICAL EXAM:  GENERAL: NAD, well-developed  HEAD:  Atraumatic, Normocephalic  EYES: EOMI, PERRLA, conjunctiva and sclera clear  NECK: Supple, No JVD  CHEST/LUNG: Clear to auscultation bilaterally; No wheeze  HEART: Regular rate and rhythm; No murmurs, rubs, or gallops  ABDOMEN: Soft, Nontender, Nondistended; Bowel sounds present  EXTREMITIES:, RLE pain out of proportion, erythematous. 2+ peripheral pulse. + LE edema  PSYCH: AAOx3  NEUROLOGY: non-focal  SKIN: black macular lesion anterior aspect of RLE PHYSICAL EXAM:  GENERAL: NAD, well-developed  HEAD:  Atraumatic, Normocephalic  EYES: EOMI, PERRLA, conjunctiva and sclera clear  NECK: Supple, No JVD  CHEST/LUNG: Clear to auscultation bilaterally; No wheeze  HEART: Regular rate and rhythm; No murmurs, rubs, or gallops  ABDOMEN: Soft, Nontender, Nondistended; Bowel sounds present  EXTREMITIES:, RLE pain out of proportion, erythematous. 2+ peripheral pulse. no edema. Legs same size  PSYCH: AAOx3  NEUROLOGY: non-focal  SKIN: black macular lesion anterior aspect of RLE PHYSICAL EXAM:  GENERAL: NAD, well-developed  NECK: no JVD, +right thyroid nodules palpable  HEAD:  Atraumatic, Normocephalic  EYES: EOMI, PERRLA, conjunctiva and sclera clear  NECK: Supple, No JVD  CHEST/LUNG: Clear to auscultation bilaterally; No wheeze  HEART: Regular rate and rhythm; No murmurs, rubs, or gallops  ABDOMEN: Soft, Nontender, Nondistended; Bowel sounds present  EXTREMITIES:, RLE pain out of proportion, erythematous. 2+ peripheral pulse. no edema. Legs same size  PSYCH: AAOx3  NEUROLOGY: non-focal  SKIN: black macular lesion anterior aspect of RLE

## 2019-06-26 NOTE — ED PROVIDER NOTE - OBJECTIVE STATEMENT
82 yo F with pmh of HTN, HLD, DM, asthma, CHF, glaucoma, CAD s/p CABG 2003 and stents c/o midsternal sharp chest pain that started around 10am while she was in her endocrinologists office. Pt states the pain lasted a few seconds. Pt was seen by the cardiologist in the office and sent to the ED. Pt states her pain has resolved. Pt reports intermittent L sided chest discomfort for the past month. Pt states she notices the discomfort while getting ready for bed and then it goes away on its own. Denies fever, chills, sob, palpitations, sweats, dizziness, n/v, recent travel. Reports chronic LE swelling.

## 2019-06-26 NOTE — H&P ADULT - HISTORY OF PRESENT ILLNESS
83yoF with a PMH of CAD c/b multiple MI's s/p CABG 2003 and multiple PCIs with stents placed, PVD (7 peripheral LE stents), CHF (EF 45-50% Jan 2014), HTN, HLD, asthma, depression who presents from her outpatient cardiologist's office with chest pain worsening over the last 3 weeks. Pain is 7/10, substernal, nonradiating. It feels like the chest pain she's had in the past. She also admits to pain to palpation and a "funny feeling" in her RLE.  Pt admits to not taking any of her medication for one month, including her DAPT and other cardiac meds, due to feeling depressed. ROS otherwise negative for fever, chills, SOB, cough, n/v/c/d.     ED: HR 67, /77, HR 67, RR 16 sat 97% on RA. EKG with new TWI in lateral leads and old Q waves in V1/V2. Trop 0.05, BNP 1810. Loaded with 600 plavix/ 325 ASA. No heparin given. CXR WNL.

## 2019-06-26 NOTE — H&P ADULT - ATTENDING COMMENTS
Assessment: Patient personally seen and examined myself during rounds with the   Resident  ON DATE 6/26/19    Note read, including vitals, physical findings, laboratory data, and radiological reports.   Agree with above and revisions, if any, included below.  - New ACS-NSTEMI  - Severe chest pain  - Unstable angina  - Plan of Care: continuous telemetry monitoring for arrhythmias, echocardiogram to evaluate ejection fraction and central pressures, daily weights and I/Os, serial EKGs and lab work to evaluate and replete potassium, magnesium. Troponin added to labs and will cycle. Will need to be seen by EP or Interv Cardio if intervention needed.

## 2019-06-26 NOTE — H&P ADULT - NSICDXPASTSURGICALHX_GEN_ALL_CORE_FT
PAST SURGICAL HISTORY:  H/O heart artery stent     History of total knee replacement S/P knee replacement, bilateral    Status post aorto-coronary artery bypass graft 2003

## 2019-06-26 NOTE — ED ADULT NURSE NOTE - OBJECTIVE STATEMENT
82 y/o F a&ox4 BIBA c/o L sided chest pain. Pt states " I was at my endocrine office and had chest pain." was seen at cardiology office after pain and was sent to [facility]. pt has had intermittent chest pain x 2 weeks at night, non radiating. while at office had 6/10 sharp chest pain. denies chest pain now. denies SOB, dizziness, numbness, tingling, weakness. placed on CCM, NSR, EKG complete. +1 pitting edema noted to LLE/RLE. Hx of MI in 2003, had coronary bypass surgery.

## 2019-06-26 NOTE — H&P ADULT - NSICDXPASTMEDICALHX_GEN_ALL_CORE_FT
PAST MEDICAL HISTORY:  Asthma dx Jan 2013, no intubations    Atherosclerosis of coronary artery s/p CABG 2003, s/p PCI with CHRISTI x 3 07/2013    Chronic diastolic CHF (congestive heart failure) last EF 45-50% Jan 2014    Essential hypertension HTN (hypertension)    Glaucoma Glaucoma    Hyperlipidemia HLD (hyperlipidemia)    Osteoarthritis Osteoarthritis    Osteomalacia Vitamin D deficient osteomalacia    Peripheral vascular disease PVD (peripheral vascular disease)

## 2019-06-26 NOTE — H&P ADULT - PROBLEM SELECTOR PLAN 5
- 's, currently asymptomatic. Pt no longer had CP by time of exam  - will restart pt's BB and ACE as above  - goal 140's/150's

## 2019-06-26 NOTE — CONSULT NOTE ADULT - SUBJECTIVE AND OBJECTIVE BOX
Attending: Sadie    HPI:  83F with PMH of CAD (s/p CABG 2003) and multiple PCIs with stents, peripheral vascular disease (multiple stents in b/l LEs), CHF, HTN, HLD, Gout, admitted to the hospital for an NSTEMI, with plans for cardiac cath tomorrow. During interview, patient reported pain in her RLE. Vascular consulted due to concern for "pain out of proportion" in her RLE with a history of PVD. Patient follows Dr. Simpson (Vascular Surgery) at [facility], last seen this past May. She has 3 stents in the RLE, 4 in the LLE. The patient states there were no issues at her last office visit. She denies symptoms of claudication, denies rest pain. Patient points to a hyperpigmented spot on her R anterior shin and states this is where the pain is focused. She states it is tender when anything touches it. Dr. Simpson had had given her 1 week of antibiotics which the patient completed, but she does not recall which medication. Otherwise states her CP has resolved. Denies SOB. Denies N/V.    In the ED, patient was afebrile, hypertensive to 180s, remaining vital signs stable. Labs WNL except for Troponins to 0.05.    PAST MEDICAL & SURGICAL HISTORY:  Chronic diastolic CHF (congestive heart failure): last EF 45-50% Jan 2014  Atherosclerosis of coronary artery: s/p CABG 2003, s/p PCI with CHRISTI x 3 07/2013  Asthma: dx Jan 2013, no intubations  Glaucoma: Glaucoma  Osteoarthritis: Osteoarthritis  Hyperlipidemia: HLD (hyperlipidemia)  Essential hypertension: HTN (hypertension)  Peripheral vascular disease: PVD (peripheral vascular disease)  Osteomalacia: Vitamin D deficient osteomalacia  H/O heart artery stent  History of total knee replacement: S/P knee replacement, bilateral  Status post aorto-coronary artery bypass graft: 2003    MEDICATIONS  (STANDING):  heparin  Infusion.  Unit(s)/Hr (6 mL/Hr) IV Continuous <Continuous>  heparin  Injectable 2900 Unit(s) IV Push once  lisinopril 20 milliGRAM(s) Oral every 24 hours  metoprolol succinate ER 25 milliGRAM(s) Oral every 12 hours    Allergies  colchicine (Other; Swelling)  erythromycin (Other)  Minocin (Swelling)    FAMILY HISTORY:  No pertinent family history in first degree relatives    Vital Signs Last 24 Hrs  T(C): 36.7 (26 Jun 2019 15:43), Max: 36.7 (26 Jun 2019 15:43)  T(F): 98 (26 Jun 2019 15:43), Max: 98 (26 Jun 2019 15:43)  HR: 74 (26 Jun 2019 15:31) (67 - 77)  BP: 189/86 (26 Jun 2019 15:31) (164/77 - 189/86)  BP(mean): 135 (26 Jun 2019 15:31) (135 - 135)  RR: 26 (26 Jun 2019 15:31) (16 - 26)  SpO2: 97% (26 Jun 2019 15:31) (97% - 98%)    I&O's Summary  26 Jun 2019 07:01  -  26 Jun 2019 16:16  --------------------------------------------------------  IN: 0 mL / OUT: 350 mL / NET: -350 mL    Physical Exam:  General: NAD, resting comfortably  Pulmonary: normal resp effort  Extremities:  RLE with 1x1cm circular mole at anterior shin; erythema from foot up to mid-shin; tender to palpation, not warm to touch, no open wounds or ulcerations; motor/sensation intact  LLE WWP, no wounds  b/l LE 1+ pitting edema  Neuro: A/O x 3, CNs II-XII grossly intact, normal sensation, no focal deficits  Pulses:  RLE Fem 2+, Pop Tri, DP 1+, PT Tri  LLE Fem 2+, Pop Tri, DP 2+, PT Tri    LABS:                      13.4   7.69  )-----------( 192      ( 26 Jun 2019 12:03 )             41.8     06-26  140  |  103  |  17  ----------------------------<  99  4.2   |  27  |  0.98    Ca    9.9      26 Jun 2019 12:03    TPro  7.9  /  Alb  4.2  /  TBili  0.5  /  DBili  x   /  AST  29  /  ALT  20  /  AlkPhos  78  06-26    PT/INR - ( 26 Jun 2019 12:03 )   PT: 11.4 sec;   INR: 1.01     PTT - ( 26 Jun 2019 12:03 )  PTT:33.4 sec    LIVER FUNCTIONS - ( 26 Jun 2019 12:03 )  Alb: 4.2 g/dL / Pro: 7.9 g/dL / ALK PHOS: 78 U/L / ALT: 20 U/L / AST: 29 U/L / GGT: x

## 2019-06-26 NOTE — H&P ADULT - ASSESSMENT
83yoF with a PMH of CAD c/b multiple MI's s/p CABG 2003 and multiple PCIs with stents placed, PVD (7 peripheral LE stents), CHF (EF 45-50% Jan 2014), HTN, HLD, asthma, depression who presents from her outpatient cardiologist's office with chest pain worsening over the last 3 weeks. Admitted for NSTEMI management.

## 2019-06-26 NOTE — ASSESSMENT
[Hypoglycemia Management] : hypoglycemia management [FreeTextEntry1] : t2 diabetes diet control\par Apetite decreased, will see RD\par A1c today 5.8%\par Continue diabetes diet control\par \par Followup with GI doctor\par Return in 6 months\par \par #2- thyroid nodule , had FNA biopsy\par Clinically euthyroid

## 2019-06-26 NOTE — ADDENDUM
[FreeTextEntry1] : I, Jameskatherine Jose, acted solely as a scribe for Dr. Denton Mckenzie on this date. 06/26/2019.\par

## 2019-06-26 NOTE — ED PROVIDER NOTE - ATTENDING CONTRIBUTION TO CARE
84 yo F with pmh of HTN, HLD, DM, asthma, CHF, glaucoma, CAD s/p CABG 2003 and stents 2017 here w/ complaint of CP that started while in her endocrinologists office, mid sternal, lasted a few seconds, but intermittent cp x 1 month. VSS, pt comfortable appearing, 2+ pitting edema bilaterally. troponin +, concerning for stemi. ekg reviewed by me - no stemi but new TWI laterally from prior but no dynamic changes from ekg in office and ekg . will need admission for nstemi.

## 2019-06-26 NOTE — PHYSICAL EXAM
[General Appearance - Well Developed] : well developed [Normal Oral Mucosa] : normal oral mucosa [Normal Oropharynx] : normal oropharynx [Respiration, Rhythm And Depth] : normal respiratory rhythm and effort [Normal Jugular Venous V Waves Present] : normal jugular venous V waves present [Heart Rate And Rhythm] : heart rate and rhythm were normal [Heart Sounds] : normal S1 and S2 [Arterial Pulses Normal] : the arterial pulses were normal [Abdomen Soft] : soft [Bowel Sounds] : normal bowel sounds [Abdomen Tenderness] : non-tender [Abnormal Walk] : normal gait [] : no hepato-splenomegaly [Nail Clubbing] : no clubbing of the fingernails [Skin Color & Pigmentation] : normal skin color and pigmentation [Oriented To Time, Place, And Person] : oriented to person, place, and time [FreeTextEntry1] : +3-4 edema

## 2019-06-26 NOTE — REVIEW OF SYSTEMS
[Lower Ext Edema] : lower extremity edema [Chest Pain] : chest pain [Fever] : no fever [Chills] : no chills [Blurry Vision] : no blurred vision [Earache] : no earache [Shortness Of Breath] : no shortness of breath [Dyspnea on exertion] : not dyspnea during exertion [Palpitations] : no palpitations [Cough] : no cough [Abdominal Pain] : no abdominal pain [Heartburn] : no heartburn [Dysphagia] : no dysphagia [Dysuria] : no dysuria [Skin: A Rash] : no rash: [Joint Pain] : no joint pain [Confusion] : no confusion was observed [Dizziness] : no dizziness [Excessive Thirst] : no polydipsia [Easy Bleeding] : no tendency for easy bleeding

## 2019-06-26 NOTE — H&P ADULT - PROBLEM SELECTOR PLAN 4
- Jun 2017 EF 45-50%, medication noncompliant. Was supposed to be taking quinapril 20, Toprol XL 25 BID, Isosorbide 30 daily, lasix 40mg PO daily, and lipitor 80  - repeat echo  - strict I/O, daily weight  - currently euvolemic, will hold off on diuretics

## 2019-06-26 NOTE — ED ADULT NURSE NOTE - CHPI ED NUR SYMPTOMS NEG
no diaphoresis/no fever/no vomiting/no syncope/no dizziness/no congestion/no back pain/no chills/no shortness of breath

## 2019-06-26 NOTE — H&P ADULT - PROBLEM SELECTOR PLAN 1
- h/o multiple MI's s/p PCI w/ stents and CABG. Pt has been off all medications for one month. Now presenting with substernal 7/10 CP  - TANJA 5, 26%  - EKG w/ TWI in lateral leads. Trop 0.5  - s/p load plavix 600/. No heparin  PLAN  - cath tomorrow  - c/w DAPT  - HTN and HLD management as below - h/o multiple MI's s/p PCI w/ stents and CABG. Pt has been off all medications for one month. Now presenting with substernal 7/10 CP  - TANJA 5, 26%  - EKG w/ TWI in lateral leads. Trop 0.5  - s/p load plavix 600/. No heparin  PLAN  - cath tomorrow  - c/w DAPT. Start hep ggt  - HTN and HLD management as below - h/o multiple MI's s/p PCI w/ stents and CABG. Pt has been off all medications for one month. Now presenting with substernal 7/10 CP  - TANJA 5, 26%  - EKG w/ TWI in lateral leads. Trop 0.5  - s/p load plavix 600/. No heparin  PLAN  - trend enzymes to peak  - cath tomorrow  - c/w DAPT. Start hep ggt. Goal PTT 60-90  - HTN and HLD management as below - h/o multiple MI's s/p PCI w/ stents and CABG. Pt has been off all medications for one month. Now presenting with substernal 7/10 CP  - TANJA 5, 26%  - EKG w/ TWI in lateral leads. Trop 0.5  - s/p load plavix 600/. No heparin  PLAN  - trend enzymes to peak  - cath tomorrow  - c/w DAPT. Start hep ggt. Goal PTT 53-73  - HTN and HLD management as below

## 2019-06-26 NOTE — HISTORY OF PRESENT ILLNESS
[FreeTextEntry1] : 84 y/o woman with hx of CABG, multiple PCIs, PVD, DM, CKD, non-complaint on meds developed chest pain about a week ago that intensify this am while waiting for endo visit. Patient describes it like pinching and poking. No associated sob, dizziness. She has chronic leg swelling. She is off medications for several months because she ran out of meds and confusion.

## 2019-06-26 NOTE — ED ADULT TRIAGE NOTE - CHIEF COMPLAINT QUOTE
elzbieta from Cardiologist office (Dr Segundo Callahan [phone]) being seen because patient has been c/o intermittent, nonradiating left chest pain that started 2 weeks ago.  Verbalized pain is relieved on arrival.

## 2019-06-26 NOTE — ED ADULT NURSE NOTE - CHIEF COMPLAINT QUOTE
elzbieta from Cardiologist office (Dr Segundo Callahan [phone]) being seen because patient has been c/o intermittent, nonradiating left chest pain that started 2 weeks ago.  Verbalized pain is relieved on arrival.

## 2019-06-27 DIAGNOSIS — I73.9 PERIPHERAL VASCULAR DISEASE, UNSPECIFIED: ICD-10-CM

## 2019-06-27 LAB
ANION GAP SERPL CALC-SCNC: 14 MMOL/L — SIGNIFICANT CHANGE UP (ref 5–17)
APPEARANCE UR: CLEAR — SIGNIFICANT CHANGE UP
APTT BLD: 82.1 SEC — HIGH (ref 27.5–36.3)
APTT BLD: 83.3 SEC — HIGH (ref 27.5–36.3)
BILIRUB UR-MCNC: NEGATIVE — SIGNIFICANT CHANGE UP
BUN SERPL-MCNC: 20 MG/DL — SIGNIFICANT CHANGE UP (ref 7–23)
CALCIUM SERPL-MCNC: 10 MG/DL — SIGNIFICANT CHANGE UP (ref 8.4–10.5)
CHLORIDE SERPL-SCNC: 104 MMOL/L — SIGNIFICANT CHANGE UP (ref 96–108)
CK MB CFR SERPL CALC: 5.4 NG/ML — SIGNIFICANT CHANGE UP (ref 0–6.7)
CK MB CFR SERPL CALC: 5.8 NG/ML — SIGNIFICANT CHANGE UP (ref 0–6.7)
CK SERPL-CCNC: 177 U/L — HIGH (ref 25–170)
CK SERPL-CCNC: 208 U/L — HIGH (ref 25–170)
CO2 SERPL-SCNC: 22 MMOL/L — SIGNIFICANT CHANGE UP (ref 22–31)
COLOR SPEC: YELLOW — SIGNIFICANT CHANGE UP
CREAT SERPL-MCNC: 0.99 MG/DL — SIGNIFICANT CHANGE UP (ref 0.5–1.3)
DIFF PNL FLD: NEGATIVE — SIGNIFICANT CHANGE UP
GLUCOSE BLDC GLUCOMTR-MCNC: 101 MG/DL — HIGH (ref 70–99)
GLUCOSE BLDC GLUCOMTR-MCNC: 64 MG/DL — LOW (ref 70–99)
GLUCOSE BLDC GLUCOMTR-MCNC: 69 MG/DL — LOW (ref 70–99)
GLUCOSE BLDC GLUCOMTR-MCNC: 85 MG/DL — SIGNIFICANT CHANGE UP (ref 70–99)
GLUCOSE SERPL-MCNC: 86 MG/DL — SIGNIFICANT CHANGE UP (ref 70–99)
GLUCOSE UR QL: NEGATIVE — SIGNIFICANT CHANGE UP
HBA1C BLD-MCNC: 6 % — HIGH (ref 4–5.6)
HCT VFR BLD CALC: 41.8 % — SIGNIFICANT CHANGE UP (ref 34.5–45)
HGB BLD-MCNC: 13.3 G/DL — SIGNIFICANT CHANGE UP (ref 11.5–15.5)
INR BLD: 1.03 — SIGNIFICANT CHANGE UP (ref 0.88–1.16)
KETONES UR-MCNC: 15 MG/DL
LEUKOCYTE ESTERASE UR-ACNC: ABNORMAL
MAGNESIUM SERPL-MCNC: 2 MG/DL — SIGNIFICANT CHANGE UP (ref 1.6–2.6)
MCHC RBC-ENTMCNC: 28.2 PG — SIGNIFICANT CHANGE UP (ref 27–34)
MCHC RBC-ENTMCNC: 31.8 GM/DL — LOW (ref 32–36)
MCV RBC AUTO: 88.7 FL — SIGNIFICANT CHANGE UP (ref 80–100)
NITRITE UR-MCNC: NEGATIVE — SIGNIFICANT CHANGE UP
NRBC # BLD: 0 /100 WBCS — SIGNIFICANT CHANGE UP (ref 0–0)
PH UR: 6 — SIGNIFICANT CHANGE UP (ref 5–8)
PLATELET # BLD AUTO: 204 K/UL — SIGNIFICANT CHANGE UP (ref 150–400)
POTASSIUM SERPL-MCNC: 4.1 MMOL/L — SIGNIFICANT CHANGE UP (ref 3.5–5.3)
POTASSIUM SERPL-SCNC: 4.1 MMOL/L — SIGNIFICANT CHANGE UP (ref 3.5–5.3)
PROT UR-MCNC: NEGATIVE MG/DL — SIGNIFICANT CHANGE UP
PROTHROM AB SERPL-ACNC: 11.6 SEC — SIGNIFICANT CHANGE UP (ref 10–12.9)
RBC # BLD: 4.71 M/UL — SIGNIFICANT CHANGE UP (ref 3.8–5.2)
RBC # FLD: 13.2 % — SIGNIFICANT CHANGE UP (ref 10.3–14.5)
SODIUM SERPL-SCNC: 140 MMOL/L — SIGNIFICANT CHANGE UP (ref 135–145)
SP GR SPEC: 1.01 — SIGNIFICANT CHANGE UP (ref 1–1.03)
TROPONIN T SERPL-MCNC: 0.05 NG/ML — CRITICAL HIGH (ref 0–0.01)
TROPONIN T SERPL-MCNC: 0.05 NG/ML — CRITICAL HIGH (ref 0–0.01)
UROBILINOGEN FLD QL: 0.2 E.U./DL — SIGNIFICANT CHANGE UP
WBC # BLD: 7.9 K/UL — SIGNIFICANT CHANGE UP (ref 3.8–10.5)
WBC # FLD AUTO: 7.9 K/UL — SIGNIFICANT CHANGE UP (ref 3.8–10.5)

## 2019-06-27 PROCEDURE — 93459 L HRT ART/GRFT ANGIO: CPT | Mod: 26,XU

## 2019-06-27 PROCEDURE — 93010 ELECTROCARDIOGRAM REPORT: CPT

## 2019-06-27 PROCEDURE — 74018 RADEX ABDOMEN 1 VIEW: CPT | Mod: 26

## 2019-06-27 PROCEDURE — 92978 ENDOLUMINL IVUS OCT C 1ST: CPT | Mod: 26,LM

## 2019-06-27 PROCEDURE — 93306 TTE W/DOPPLER COMPLETE: CPT | Mod: 26

## 2019-06-27 PROCEDURE — 92941 PRQ TRLML REVSC TOT OCCL AMI: CPT | Mod: LC

## 2019-06-27 PROCEDURE — 99232 SBSQ HOSP IP/OBS MODERATE 35: CPT

## 2019-06-27 PROCEDURE — 92979 ENDOLUMINL IVUS OCT C EA: CPT | Mod: 26,LC

## 2019-06-27 PROCEDURE — 92928 PRQ TCAT PLMT NTRAC ST 1 LES: CPT | Mod: LC

## 2019-06-27 RX ORDER — MULTIVIT WITH MIN/MFOLATE/K2 340-15/3 G
1 POWDER (GRAM) ORAL ONCE
Refills: 0 | Status: DISCONTINUED | OUTPATIENT
Start: 2019-06-28 | End: 2019-06-28

## 2019-06-27 RX ORDER — DEXTROSE 50 % IN WATER 50 %
12.5 SYRINGE (ML) INTRAVENOUS ONCE
Refills: 0 | Status: COMPLETED | OUTPATIENT
Start: 2019-06-27 | End: 2019-06-27

## 2019-06-27 RX ORDER — POLYETHYLENE GLYCOL 3350 17 G/17G
17 POWDER, FOR SOLUTION ORAL EVERY 24 HOURS
Refills: 0 | Status: DISCONTINUED | OUTPATIENT
Start: 2019-06-27 | End: 2019-06-29

## 2019-06-27 RX ORDER — INSULIN LISPRO 100/ML
VIAL (ML) SUBCUTANEOUS
Refills: 0 | Status: DISCONTINUED | OUTPATIENT
Start: 2019-06-27 | End: 2019-06-29

## 2019-06-27 RX ORDER — ACETAMINOPHEN 500 MG
650 TABLET ORAL EVERY 6 HOURS
Refills: 0 | Status: DISCONTINUED | OUTPATIENT
Start: 2019-06-27 | End: 2019-06-29

## 2019-06-27 RX ORDER — GLUCAGON INJECTION, SOLUTION 0.5 MG/.1ML
1 INJECTION, SOLUTION SUBCUTANEOUS ONCE
Refills: 0 | Status: DISCONTINUED | OUTPATIENT
Start: 2019-06-27 | End: 2019-06-29

## 2019-06-27 RX ORDER — SODIUM CHLORIDE 9 MG/ML
500 INJECTION, SOLUTION INTRAVENOUS
Refills: 0 | Status: DISCONTINUED | OUTPATIENT
Start: 2019-06-27 | End: 2019-06-28

## 2019-06-27 RX ORDER — DEXTROSE 50 % IN WATER 50 %
15 SYRINGE (ML) INTRAVENOUS ONCE
Refills: 0 | Status: DISCONTINUED | OUTPATIENT
Start: 2019-06-27 | End: 2019-06-29

## 2019-06-27 RX ORDER — SIMETHICONE 80 MG/1
80 TABLET, CHEWABLE ORAL
Refills: 0 | Status: DISCONTINUED | OUTPATIENT
Start: 2019-06-27 | End: 2019-06-29

## 2019-06-27 RX ORDER — HEPARIN SODIUM 5000 [USP'U]/ML
400 INJECTION INTRAVENOUS; SUBCUTANEOUS
Qty: 25000 | Refills: 0 | Status: DISCONTINUED | OUTPATIENT
Start: 2019-06-27 | End: 2019-06-27

## 2019-06-27 RX ORDER — SODIUM CHLORIDE 9 MG/ML
1000 INJECTION, SOLUTION INTRAVENOUS
Refills: 0 | Status: DISCONTINUED | OUTPATIENT
Start: 2019-06-27 | End: 2019-06-29

## 2019-06-27 RX ORDER — DOCUSATE SODIUM 100 MG
100 CAPSULE ORAL THREE TIMES A DAY
Refills: 0 | Status: DISCONTINUED | OUTPATIENT
Start: 2019-06-27 | End: 2019-06-29

## 2019-06-27 RX ORDER — DEXTROSE 50 % IN WATER 50 %
25 SYRINGE (ML) INTRAVENOUS ONCE
Refills: 0 | Status: DISCONTINUED | OUTPATIENT
Start: 2019-06-27 | End: 2019-06-29

## 2019-06-27 RX ORDER — DEXTROSE 50 % IN WATER 50 %
12.5 SYRINGE (ML) INTRAVENOUS ONCE
Refills: 0 | Status: DISCONTINUED | OUTPATIENT
Start: 2019-06-27 | End: 2019-06-29

## 2019-06-27 RX ADMIN — SIMETHICONE 80 MILLIGRAM(S): 80 TABLET, CHEWABLE ORAL at 12:07

## 2019-06-27 RX ADMIN — Medication 1 MILLIGRAM(S): at 17:53

## 2019-06-27 RX ADMIN — Medication 100 MILLIGRAM(S): at 21:31

## 2019-06-27 RX ADMIN — Medication 25 MILLIGRAM(S): at 17:53

## 2019-06-27 RX ADMIN — Medication 81 MILLIGRAM(S): at 09:40

## 2019-06-27 RX ADMIN — PANTOPRAZOLE SODIUM 40 MILLIGRAM(S): 20 TABLET, DELAYED RELEASE ORAL at 06:20

## 2019-06-27 RX ADMIN — Medication 12.5 GRAM(S): at 11:38

## 2019-06-27 RX ADMIN — LISINOPRIL 20 MILLIGRAM(S): 2.5 TABLET ORAL at 12:25

## 2019-06-27 RX ADMIN — SIMETHICONE 80 MILLIGRAM(S): 80 TABLET, CHEWABLE ORAL at 17:53

## 2019-06-27 RX ADMIN — ATORVASTATIN CALCIUM 80 MILLIGRAM(S): 80 TABLET, FILM COATED ORAL at 21:31

## 2019-06-27 RX ADMIN — Medication 100 MILLIGRAM(S): at 12:01

## 2019-06-27 RX ADMIN — Medication 100 MILLIGRAM(S): at 00:51

## 2019-06-27 RX ADMIN — HEPARIN SODIUM 4 UNIT(S)/HR: 5000 INJECTION INTRAVENOUS; SUBCUTANEOUS at 06:21

## 2019-06-27 RX ADMIN — Medication 100 MILLIGRAM(S): at 06:20

## 2019-06-27 RX ADMIN — CLOPIDOGREL BISULFATE 75 MILLIGRAM(S): 75 TABLET, FILM COATED ORAL at 09:40

## 2019-06-27 RX ADMIN — SODIUM CHLORIDE 75 MILLILITER(S): 9 INJECTION, SOLUTION INTRAVENOUS at 09:42

## 2019-06-27 NOTE — PROGRESS NOTE ADULT - SUBJECTIVE AND OBJECTIVE BOX
Vascular Surgery Consult - Progress Note    Subjective:  Patient reports a headache, says it might have started when her BP was high. Otherwise no complaints. She says the pain in her leg feels slightly improved.    Vital Signs Last 24 Hrs  T(C): 36.5 (27 Jun 2019 09:56), Max: 36.9 (26 Jun 2019 21:59)  T(F): 97.7 (27 Jun 2019 09:56), Max: 98.5 (26 Jun 2019 21:59)  HR: 66 (27 Jun 2019 11:28) (58 - 77)  BP: 152/72 (27 Jun 2019 11:28) (115/76 - 189/86)  BP(mean): 109 (27 Jun 2019 11:28) (90 - 135)  RR: 29 (27 Jun 2019 11:28) (16 - 33)  SpO2: 99% (27 Jun 2019 11:28) (92% - 100%)    I&O's Summary  26 Jun 2019 07:01  -  27 Jun 2019 07:00  --------------------------------------------------------  IN: 300 mL / OUT: 1350 mL / NET: -1050 mL    27 Jun 2019 07:01  -  27 Jun 2019 12:05  --------------------------------------------------------  IN: 162 mL / OUT: 0 mL / NET: 162 mL    Physical Exam:  General: NAD, resting comfortably  Pulmonary: normal resp effort  Extremities:  RLE with 1x1cm circular mole at anterior shin; improved erythema compared to yesterdays exam; tender to palpation, not warm to touch, no open wounds or ulcerations; motor/sensation intact  LLE WWP, no wounds; improved b/l LE edema  Neuro: A/O x 3, CNs II-XII grossly intact, normal sensation, no focal deficits  Pulses:  RLE Fem 2+, Pop Tri, DP 1+, PT Tri  LLE Fem 2+, Pop Tri, DP 2+, PT Tri    LABS:                    13.3   7.90  )-----------( 204      ( 27 Jun 2019 04:48 )             41.8     06-27  140  |  104  |  20  ----------------------------<  86  4.1   |  22  |  0.99    Ca    10.0      27 Jun 2019 04:48  Mg     2.0     06-27    TPro  7.9  /  Alb  4.2  /  TBili  0.5  /  DBili  x   /  AST  29  /  ALT  20  /  AlkPhos  78  06-26    PT/INR - ( 27 Jun 2019 04:48 )   PT: 11.6 sec;   INR: 1.03     PTT - ( 27 Jun 2019 04:48 )  PTT:83.3 sec    LIVER FUNCTIONS - ( 26 Jun 2019 12:03 )  Alb: 4.2 g/dL / Pro: 7.9 g/dL / ALK PHOS: 78 U/L / ALT: 20 U/L / AST: 29 U/L / GGT: x           CAPILLARY BLOOD GLUCOSE  POCT Blood Glucose.: 64 mg/dL (27 Jun 2019 11:07)

## 2019-06-27 NOTE — PROGRESS NOTE ADULT - SUBJECTIVE AND OBJECTIVE BOX
OVERNIGHT EVENTS:    SUBJECTIVE / INTERVAL HPI: Patient seen and examined at bedside.     VITAL SIGNS:  Vital Signs Last 24 Hrs  T(C): 36.5 (2019 09:56), Max: 36.9 (2019 21:59)  T(F): 97.7 (2019 09:56), Max: 98.5 (2019 21:59)  HR: 66 (2019 11:28) (58 - 77)  BP: 152/72 (2019 11:28) (115/76 - 189/86)  BP(mean): 109 (2019 11:28) (90 - 135)  RR: 29 (2019 11:28) (16 - 33)  SpO2: 99% (2019 11:28) (92% - 100%)    PHYSICAL EXAM:    General: WDWN  HEENT: NCAT; PERRL, anicteric sclera; MMM  Neck: supple, trachea midline  Cardiovascular: S1, S2 normal; RRR, no M/G/R  Respiratory: CTABL; no W/R/R  Gastrointestinal: soft, nontender, nondistended. bowel sounds present.  Skin: no ulcerations or visible rashes appreciated  Extremities: WWP; no edema, clubbing or cyanosis  Vascular: 2+ radial, DP/PT pulses B/L  Neurological: AAOx3; CN II-XII grossly intact; no focal deficits    MEDICATIONS:  MEDICATIONS  (STANDING):  aspirin enteric coated 81 milliGRAM(s) Oral daily  atorvastatin 80 milliGRAM(s) Oral at bedtime  ceFAZolin   IVPB 1000 milliGRAM(s) IV Intermittent every 12 hours  clopidogrel Tablet 75 milliGRAM(s) Oral daily  dextrose 5%. 1000 milliLiter(s) (50 mL/Hr) IV Continuous <Continuous>  dextrose 50% Injectable 12.5 Gram(s) IV Push once  dextrose 50% Injectable 25 Gram(s) IV Push once  dextrose 50% Injectable 25 Gram(s) IV Push once  docusate sodium 100 milliGRAM(s) Oral three times a day  folic acid 1 milliGRAM(s) Oral daily  heparin  Infusion 400 Unit(s)/Hr (4 mL/Hr) IV Continuous <Continuous>  insulin lispro (HumaLOG) corrective regimen sliding scale   SubCutaneous Before meals and at bedtime  lisinopril 20 milliGRAM(s) Oral every 24 hours  metoprolol succinate ER 25 milliGRAM(s) Oral every 12 hours  pantoprazole    Tablet 40 milliGRAM(s) Oral before breakfast  simethicone 80 milliGRAM(s) Chew two times a day  sodium chloride 0.45%. 500 milliLiter(s) (75 mL/Hr) IV Continuous <Continuous>    MEDICATIONS  (PRN):  acetaminophen   Tablet .. 650 milliGRAM(s) Oral every 6 hours PRN Moderate Pain (4 - 6)  dextrose 40% Gel 15 Gram(s) Oral once PRN Blood Glucose LESS THAN 70 milliGRAM(s)/deciliter  glucagon  Injectable 1 milliGRAM(s) IntraMuscular once PRN Glucose LESS THAN 70 milligrams/deciliter      ALLERGIES:  Allergies    colchicine (Other; Swelling)  erythromycin (Other)  Minocin (Swelling)    Intolerances        LABS:                        13.3   7.90  )-----------( 204      ( 2019 04:48 )             41.8     06-    140  |  104  |  20  ----------------------------<  86  4.1   |  22  |  0.99    Ca    10.0      2019 04:48  Mg     2.0     -    TPro  7.9  /  Alb  4.2  /  TBili  0.5  /  DBili  x   /  AST  29  /  ALT  20  /  AlkPhos  78  06-26    PT/INR - ( 2019 04:48 )   PT: 11.6 sec;   INR: 1.03          PTT - ( 2019 11:55 )  PTT:82.1 sec  Urinalysis Basic - ( 2019 12:23 )    Color: Yellow / Appearance: Clear / S.015 / pH: x  Gluc: x / Ketone: 15 mg/dL  / Bili: Negative / Urobili: 0.2 E.U./dL   Blood: x / Protein: NEGATIVE mg/dL / Nitrite: NEGATIVE   Leuk Esterase: Small / RBC: x / WBC x   Sq Epi: x / Non Sq Epi: x / Bacteria: x      CAPILLARY BLOOD GLUCOSE      POCT Blood Glucose.: 64 mg/dL (2019 11:07)      RADIOLOGY & ADDITIONAL TESTS: Reviewed. OVERNIGHT EVENTS: o/n had some RLE cramping but otherwise EMELY    SUBJECTIVE / INTERVAL HPI: Patient seen and examined at bedside. ROS reports continued RLE cramping and pain. She denies fever, chills, chest pain, SOB, n/v. Admits to chronic constipation stating she has not had a good quality BM since 2018.     VITAL SIGNS:  Vital Signs Last 24 Hrs  T(C): 36.5 (2019 09:56), Max: 36.9 (2019 21:59)  T(F): 97.7 (2019 09:56), Max: 98.5 (2019 21:59)  HR: 66 (2019 11:28) (58 - 77)  BP: 152/72 (2019 11:28) (115/76 - 189/86)  BP(mean): 109 (2019 11:28) (90 - 135)  RR: 29 (2019 11:28) (16 - 33)  SpO2: 99% (2019 11:28) (92% - 100%)    PHYSICAL EXAM:    General: WDWN  HEENT: NCAT; PERRL, anicteric sclera; MMM  Neck: supple, trachea midline  Cardiovascular: S1, S2 normal; RRR, no M/G/R  Respiratory: CTABL; no W/R/R  Gastrointestinal: soft, +tender RLQ without rebound, nondistended. bowel sounds present.  Skin: no ulcerations or visible rashes appreciated  Extremities: WWP; +1 edema at ankles b/l. No clubbing or cyanosis  Vascular: 2+ radial, DP/PT pulses B/L  Neurological: AAOx3; CN II-XII grossly intact; no focal deficits    MEDICATIONS:  MEDICATIONS  (STANDING):  aspirin enteric coated 81 milliGRAM(s) Oral daily  atorvastatin 80 milliGRAM(s) Oral at bedtime  ceFAZolin   IVPB 1000 milliGRAM(s) IV Intermittent every 12 hours  clopidogrel Tablet 75 milliGRAM(s) Oral daily  dextrose 5%. 1000 milliLiter(s) (50 mL/Hr) IV Continuous <Continuous>  dextrose 50% Injectable 12.5 Gram(s) IV Push once  dextrose 50% Injectable 25 Gram(s) IV Push once  dextrose 50% Injectable 25 Gram(s) IV Push once  docusate sodium 100 milliGRAM(s) Oral three times a day  folic acid 1 milliGRAM(s) Oral daily  heparin  Infusion 400 Unit(s)/Hr (4 mL/Hr) IV Continuous <Continuous>  insulin lispro (HumaLOG) corrective regimen sliding scale   SubCutaneous Before meals and at bedtime  lisinopril 20 milliGRAM(s) Oral every 24 hours  metoprolol succinate ER 25 milliGRAM(s) Oral every 12 hours  pantoprazole    Tablet 40 milliGRAM(s) Oral before breakfast  simethicone 80 milliGRAM(s) Chew two times a day  sodium chloride 0.45%. 500 milliLiter(s) (75 mL/Hr) IV Continuous <Continuous>    MEDICATIONS  (PRN):  acetaminophen   Tablet .. 650 milliGRAM(s) Oral every 6 hours PRN Moderate Pain (4 - 6)  dextrose 40% Gel 15 Gram(s) Oral once PRN Blood Glucose LESS THAN 70 milliGRAM(s)/deciliter  glucagon  Injectable 1 milliGRAM(s) IntraMuscular once PRN Glucose LESS THAN 70 milligrams/deciliter      ALLERGIES:  Allergies    colchicine (Other; Swelling)  erythromycin (Other)  Minocin (Swelling)    Intolerances        LABS:                        13.3   7.90  )-----------( 204      ( 2019 04:48 )             41.8     06-27    140  |  104  |  20  ----------------------------<  86  4.1   |  22  |  0.99    Ca    10.0      2019 04:48  Mg     2.0     06-27    TPro  7.9  /  Alb  4.2  /  TBili  0.5  /  DBili  x   /  AST  29  /  ALT  20  /  AlkPhos  78  06-26    PT/INR - ( 2019 04:48 )   PT: 11.6 sec;   INR: 1.03          PTT - ( 2019 11:55 )  PTT:82.1 sec  Urinalysis Basic - ( 2019 12:23 )    Color: Yellow / Appearance: Clear / S.015 / pH: x  Gluc: x / Ketone: 15 mg/dL  / Bili: Negative / Urobili: 0.2 E.U./dL   Blood: x / Protein: NEGATIVE mg/dL / Nitrite: NEGATIVE   Leuk Esterase: Small / RBC: x / WBC x   Sq Epi: x / Non Sq Epi: x / Bacteria: x      CAPILLARY BLOOD GLUCOSE      POCT Blood Glucose.: 64 mg/dL (2019 11:07)      RADIOLOGY & ADDITIONAL TESTS: Reviewed. HOSPITAL COURSE  83yoF with a PMH of CAD c/b multiple MI's s/p CABG  and multiple PCIs with stents placed, PVD (7 peripheral LE stents), CHF (EF 45-50% 2014), HTN, HLD, asthma, depression who presents from her outpatient cardiologist's office with chest pain worsening over the last 3 weeks. Admitted for ACS r/o. She was off all of her meds for 1mo due to feeling depressed per patient. EKG on arrival + for T wave inversions. Trop on arrival 0.5, which stayed flat. She was loaded with / Plavix 600 and a heparin drip was started. Echo revealed EF 40$ mid anteroseptal wall w/ severe hypokinesis, apical septal wall severe hypokinesis, moderately hypokinetic. PASP 37. Pt was catheterized 19, which revealed     OVERNIGHT EVENTS: o/n had some RLE cramping but otherwise EMELY    SUBJECTIVE / INTERVAL HPI: Patient seen and examined at bedside. ROS reports continued RLE cramping and pain. She denies fever, chills, chest pain, SOB, n/v. Admits to chronic constipation stating she has not had a good quality BM since 2018.     VITAL SIGNS:  Vital Signs Last 24 Hrs  T(C): 36.5 (2019 09:56), Max: 36.9 (2019 21:59)  T(F): 97.7 (2019 09:56), Max: 98.5 (2019 21:59)  HR: 66 (2019 11:28) (58 - 77)  BP: 152/72 (2019 11:28) (115/76 - 189/86)  BP(mean): 109 (2019 11:28) (90 - 135)  RR: 29 (2019 11:28) (16 - 33)  SpO2: 99% (2019 11:28) (92% - 100%)    PHYSICAL EXAM:    General: WDWN  HEENT: NCAT; PERRL, anicteric sclera; MMM  Neck: supple, trachea midline  Cardiovascular: S1, S2 normal; RRR, no M/G/R  Respiratory: CTABL; no W/R/R  Gastrointestinal: soft, +tender RLQ without rebound, nondistended. bowel sounds present.  Skin: no ulcerations or visible rashes appreciated  Extremities: WWP; +1 edema at ankles b/l. No clubbing or cyanosis  Vascular: 2+ radial, DP/PT pulses B/L  Neurological: AAOx3; CN II-XII grossly intact; no focal deficits    MEDICATIONS:  MEDICATIONS  (STANDING):  aspirin enteric coated 81 milliGRAM(s) Oral daily  atorvastatin 80 milliGRAM(s) Oral at bedtime  ceFAZolin   IVPB 1000 milliGRAM(s) IV Intermittent every 12 hours  clopidogrel Tablet 75 milliGRAM(s) Oral daily  dextrose 5%. 1000 milliLiter(s) (50 mL/Hr) IV Continuous <Continuous>  dextrose 50% Injectable 12.5 Gram(s) IV Push once  dextrose 50% Injectable 25 Gram(s) IV Push once  dextrose 50% Injectable 25 Gram(s) IV Push once  docusate sodium 100 milliGRAM(s) Oral three times a day  folic acid 1 milliGRAM(s) Oral daily  heparin  Infusion 400 Unit(s)/Hr (4 mL/Hr) IV Continuous <Continuous>  insulin lispro (HumaLOG) corrective regimen sliding scale   SubCutaneous Before meals and at bedtime  lisinopril 20 milliGRAM(s) Oral every 24 hours  metoprolol succinate ER 25 milliGRAM(s) Oral every 12 hours  pantoprazole    Tablet 40 milliGRAM(s) Oral before breakfast  simethicone 80 milliGRAM(s) Chew two times a day  sodium chloride 0.45%. 500 milliLiter(s) (75 mL/Hr) IV Continuous <Continuous>    MEDICATIONS  (PRN):  acetaminophen   Tablet .. 650 milliGRAM(s) Oral every 6 hours PRN Moderate Pain (4 - 6)  dextrose 40% Gel 15 Gram(s) Oral once PRN Blood Glucose LESS THAN 70 milliGRAM(s)/deciliter  glucagon  Injectable 1 milliGRAM(s) IntraMuscular once PRN Glucose LESS THAN 70 milligrams/deciliter      ALLERGIES:  Allergies    colchicine (Other; Swelling)  erythromycin (Other)  Minocin (Swelling)    Intolerances        LABS:                        13.3   7.90  )-----------( 204      ( 2019 04:48 )             41.8     06-27    140  |  104  |  20  ----------------------------<  86  4.1   |  22  |  0.99    Ca    10.0      2019 04:48  Mg     2.0         TPro  7.9  /  Alb  4.2  /  TBili  0.5  /  DBili  x   /  AST  29  /  ALT  20  /  AlkPhos  78  06-    PT/INR - ( 2019 04:48 )   PT: 11.6 sec;   INR: 1.03          PTT - ( 2019 11:55 )  PTT:82.1 sec  Urinalysis Basic - ( 2019 12:23 )    Color: Yellow / Appearance: Clear / S.015 / pH: x  Gluc: x / Ketone: 15 mg/dL  / Bili: Negative / Urobili: 0.2 E.U./dL   Blood: x / Protein: NEGATIVE mg/dL / Nitrite: NEGATIVE   Leuk Esterase: Small / RBC: x / WBC x   Sq Epi: x / Non Sq Epi: x / Bacteria: x      CAPILLARY BLOOD GLUCOSE      POCT Blood Glucose.: 64 mg/dL (2019 11:07)      RADIOLOGY & ADDITIONAL TESTS: Reviewed. HOSPITAL COURSE  83yoF with a PMH of CAD c/b multiple MI's s/p CABG  and multiple PCIs with stents placed, PVD (7 peripheral LE stents), CHF (EF 45-50% 2014), HTN, HLD, asthma, depression who presents from her outpatient cardiologist's office with chest pain worsening over the last 3 weeks. Admitted for ACS r/o. She was off all of her meds for 1mo due to feeling depressed per patient. EKG on arrival + for T wave inversions. Trop on arrival 0.5, which stayed flat. She was loaded with / Plavix 600 and a heparin drip was started. Echo revealed EF 40$ mid anteroseptal wall w/ severe hypokinesis, apical septal wall severe hypokinesis, moderately hypokinetic. PASP 37. Pt was catheterized 19, CHRISTI to LCx 95% stenosed, all else patent, right radial access, recommended to c/w asa/plavix.     OVERNIGHT EVENTS: o/n had some RLE cramping but otherwise EMELY    SUBJECTIVE / INTERVAL HPI: Patient seen and examined at bedside. ROS reports continued RLE cramping and pain. She denies fever, chills, chest pain, SOB, n/v. Admits to chronic constipation stating she has not had a good quality BM since 2018.     VITAL SIGNS:  Vital Signs Last 24 Hrs  T(C): 36.5 (2019 09:56), Max: 36.9 (2019 21:59)  T(F): 97.7 (2019 09:56), Max: 98.5 (2019 21:59)  HR: 66 (2019 11:28) (58 - 77)  BP: 152/72 (2019 11:28) (115/76 - 189/86)  BP(mean): 109 (2019 11:28) (90 - 135)  RR: 29 (2019 11:28) (16 - 33)  SpO2: 99% (2019 11:28) (92% - 100%)    PHYSICAL EXAM:    General: WDWN  HEENT: NCAT; PERRL, anicteric sclera; MMM  Neck: supple, trachea midline  Cardiovascular: S1, S2 normal; RRR, no M/G/R  Respiratory: CTABL; no W/R/R  Gastrointestinal: soft, +tender RLQ without rebound, nondistended. bowel sounds present.  Skin: no ulcerations or visible rashes appreciated  Extremities: WWP; +1 edema at ankles b/l. No clubbing or cyanosis  Vascular: 2+ radial, DP/PT pulses B/L  Neurological: AAOx3; CN II-XII grossly intact; no focal deficits    MEDICATIONS:  MEDICATIONS  (STANDING):  aspirin enteric coated 81 milliGRAM(s) Oral daily  atorvastatin 80 milliGRAM(s) Oral at bedtime  ceFAZolin   IVPB 1000 milliGRAM(s) IV Intermittent every 12 hours  clopidogrel Tablet 75 milliGRAM(s) Oral daily  dextrose 5%. 1000 milliLiter(s) (50 mL/Hr) IV Continuous <Continuous>  dextrose 50% Injectable 12.5 Gram(s) IV Push once  dextrose 50% Injectable 25 Gram(s) IV Push once  dextrose 50% Injectable 25 Gram(s) IV Push once  docusate sodium 100 milliGRAM(s) Oral three times a day  folic acid 1 milliGRAM(s) Oral daily  heparin  Infusion 400 Unit(s)/Hr (4 mL/Hr) IV Continuous <Continuous>  insulin lispro (HumaLOG) corrective regimen sliding scale   SubCutaneous Before meals and at bedtime  lisinopril 20 milliGRAM(s) Oral every 24 hours  metoprolol succinate ER 25 milliGRAM(s) Oral every 12 hours  pantoprazole    Tablet 40 milliGRAM(s) Oral before breakfast  simethicone 80 milliGRAM(s) Chew two times a day  sodium chloride 0.45%. 500 milliLiter(s) (75 mL/Hr) IV Continuous <Continuous>    MEDICATIONS  (PRN):  acetaminophen   Tablet .. 650 milliGRAM(s) Oral every 6 hours PRN Moderate Pain (4 - 6)  dextrose 40% Gel 15 Gram(s) Oral once PRN Blood Glucose LESS THAN 70 milliGRAM(s)/deciliter  glucagon  Injectable 1 milliGRAM(s) IntraMuscular once PRN Glucose LESS THAN 70 milligrams/deciliter      ALLERGIES:  Allergies    colchicine (Other; Swelling)  erythromycin (Other)  Minocin (Swelling)    Intolerances        LABS:                        13.3   7.90  )-----------( 204      ( 2019 04:48 )             41.8     06-    140  |  104  |  20  ----------------------------<  86  4.1   |  22  |  0.99    Ca    10.0      2019 04:48  Mg     2.0         TPro  7.9  /  Alb  4.2  /  TBili  0.5  /  DBili  x   /  AST  29  /  ALT  20  /  AlkPhos  78  06-    PT/INR - ( 2019 04:48 )   PT: 11.6 sec;   INR: 1.03          PTT - ( 2019 11:55 )  PTT:82.1 sec  Urinalysis Basic - ( 2019 12:23 )    Color: Yellow / Appearance: Clear / S.015 / pH: x  Gluc: x / Ketone: 15 mg/dL  / Bili: Negative / Urobili: 0.2 E.U./dL   Blood: x / Protein: NEGATIVE mg/dL / Nitrite: NEGATIVE   Leuk Esterase: Small / RBC: x / WBC x   Sq Epi: x / Non Sq Epi: x / Bacteria: x      CAPILLARY BLOOD GLUCOSE      POCT Blood Glucose.: 64 mg/dL (2019 11:07)      RADIOLOGY & ADDITIONAL TESTS: Reviewed. HOSPITAL COURSE  83yoF with a PMH of CAD c/b multiple MI's s/p CABG  and multiple PCIs with stents placed, PVD (7 peripheral LE stents), CHF (EF 45-50% 2014), HTN, HLD, asthma, depression who presents from her outpatient cardiologist's office with chest pain worsening over the last 3 weeks. Admitted for ACS r/o. She was off all of her meds for 1mo due to feeling depressed per patient. EKG on arrival + for T wave inversions. Trop on arrival 0.5, which stayed flat. She was loaded with / Plavix 600 and a heparin drip was started. Echo revealed EF 40% mid anteroseptal wall w/ severe hypokinesis, apical septal wall severe hypokinesis, moderately hypokinetic. PASP 37. Pt was catheterized 19, CHRISTI to LCx 95% stenosed, all else patent, right radial access, recommended to c/w asa/plavix.     OVERNIGHT EVENTS: o/n had some RLE cramping but otherwise EMELY    SUBJECTIVE / INTERVAL HPI: Patient seen and examined at bedside. ROS reports continued RLE cramping and pain. She denies fever, chills, chest pain, SOB, n/v. Admits to chronic constipation stating she has not had a good quality BM since 2018.     VITAL SIGNS:  Vital Signs Last 24 Hrs  T(C): 36.5 (2019 09:56), Max: 36.9 (2019 21:59)  T(F): 97.7 (2019 09:56), Max: 98.5 (2019 21:59)  HR: 66 (2019 11:28) (58 - 77)  BP: 152/72 (2019 11:28) (115/76 - 189/86)  BP(mean): 109 (2019 11:28) (90 - 135)  RR: 29 (2019 11:28) (16 - 33)  SpO2: 99% (2019 11:28) (92% - 100%)    PHYSICAL EXAM:    General: WDWN  HEENT: NCAT; PERRL, anicteric sclera; MMM  Neck: supple, trachea midline  Cardiovascular: S1, S2 normal; RRR, no M/G/R  Respiratory: CTABL; no W/R/R  Gastrointestinal: soft, +tender RLQ without rebound, nondistended. bowel sounds present.  Skin: no ulcerations or visible rashes appreciated  Extremities: WWP; +1 edema at ankles b/l. No clubbing or cyanosis  Vascular: 2+ radial, DP/PT pulses B/L  Neurological: AAOx3; CN II-XII grossly intact; no focal deficits    MEDICATIONS:  MEDICATIONS  (STANDING):  aspirin enteric coated 81 milliGRAM(s) Oral daily  atorvastatin 80 milliGRAM(s) Oral at bedtime  ceFAZolin   IVPB 1000 milliGRAM(s) IV Intermittent every 12 hours  clopidogrel Tablet 75 milliGRAM(s) Oral daily  dextrose 5%. 1000 milliLiter(s) (50 mL/Hr) IV Continuous <Continuous>  dextrose 50% Injectable 12.5 Gram(s) IV Push once  dextrose 50% Injectable 25 Gram(s) IV Push once  dextrose 50% Injectable 25 Gram(s) IV Push once  docusate sodium 100 milliGRAM(s) Oral three times a day  folic acid 1 milliGRAM(s) Oral daily  heparin  Infusion 400 Unit(s)/Hr (4 mL/Hr) IV Continuous <Continuous>  insulin lispro (HumaLOG) corrective regimen sliding scale   SubCutaneous Before meals and at bedtime  lisinopril 20 milliGRAM(s) Oral every 24 hours  metoprolol succinate ER 25 milliGRAM(s) Oral every 12 hours  pantoprazole    Tablet 40 milliGRAM(s) Oral before breakfast  simethicone 80 milliGRAM(s) Chew two times a day  sodium chloride 0.45%. 500 milliLiter(s) (75 mL/Hr) IV Continuous <Continuous>    MEDICATIONS  (PRN):  acetaminophen   Tablet .. 650 milliGRAM(s) Oral every 6 hours PRN Moderate Pain (4 - 6)  dextrose 40% Gel 15 Gram(s) Oral once PRN Blood Glucose LESS THAN 70 milliGRAM(s)/deciliter  glucagon  Injectable 1 milliGRAM(s) IntraMuscular once PRN Glucose LESS THAN 70 milligrams/deciliter      ALLERGIES:  Allergies    colchicine (Other; Swelling)  erythromycin (Other)  Minocin (Swelling)    Intolerances        LABS:                        13.3   7.90  )-----------( 204      ( 2019 04:48 )             41.8     06-    140  |  104  |  20  ----------------------------<  86  4.1   |  22  |  0.99    Ca    10.0      2019 04:48  Mg     2.0         TPro  7.9  /  Alb  4.2  /  TBili  0.5  /  DBili  x   /  AST  29  /  ALT  20  /  AlkPhos  78  06-    PT/INR - ( 2019 04:48 )   PT: 11.6 sec;   INR: 1.03          PTT - ( 2019 11:55 )  PTT:82.1 sec  Urinalysis Basic - ( 2019 12:23 )    Color: Yellow / Appearance: Clear / S.015 / pH: x  Gluc: x / Ketone: 15 mg/dL  / Bili: Negative / Urobili: 0.2 E.U./dL   Blood: x / Protein: NEGATIVE mg/dL / Nitrite: NEGATIVE   Leuk Esterase: Small / RBC: x / WBC x   Sq Epi: x / Non Sq Epi: x / Bacteria: x      CAPILLARY BLOOD GLUCOSE      POCT Blood Glucose.: 64 mg/dL (2019 11:07)      RADIOLOGY & ADDITIONAL TESTS: Reviewed.

## 2019-06-27 NOTE — PROGRESS NOTE ADULT - SUBJECTIVE AND OBJECTIVE BOX
Interventional Cardiology PA Precath Note      HPI: 83yoF with a PMHx of CAD c/b multiple MI's s/p CABG 2003 and multiple PCIs with stents placed, PVD (7 peripheral LE stents), CHF (EF 45-50% Jan 2014), HTN, HLD, asthma and depression who presents from her outpatient cardiologist's office with chest pain worsening over the last 3 weeks. Pain is 7/10, substernal, nonradiating. It feels like the chest pain she's had in the past. She also admits to pain to palpation and a "funny feeling" in her RLE.  Pt admits to not taking any of her medication for one month, including her DAPT and other cardiac meds, due to feeling depressed. ROS otherwise negative for fever, chills, SOB, cough, n/v/c/d. In the ED: HR 67, /77, HR 67, RR 16 sat 97% on RA. EKG with new TWI in lateral leads and old Q waves in V1/V2. Trop 0.05, BNP 1810. Loaded with 600 plavix/325 ASA, no heparin given, CXR WNL. Pt admitted to [facility] for further management of NSTEMI.   CE trended with a peak trop of 0.05 and pt was started on a Heparin gtt. Vascular was consulted for questionable ischemic leg, and found to not have an ischemic leg, however pt was started on cefazolin 1gram for cellulitis. Pt now referred for recommended cardiac catheterization with possible intervention if clinically indicated, in light of pts risk factors, CCS class III anginal symptoms and known NSTEMI.      ALL:  colchicine (Other; Swelling)  erythromycin (Other)  Minocin (Swelling)       MEDS:   aspirin enteric coated 81 milliGRAM(s) Oral daily  atorvastatin 80 milliGRAM(s) Oral at bedtime  ceFAZolin   IVPB 1000 milliGRAM(s) IV Intermittent every 12 hours  clopidogrel Tablet 75 milliGRAM(s) Oral daily  docusate sodium 100 milliGRAM(s) Oral three times a day  folic acid 1 milliGRAM(s) Oral daily  heparin  Infusion 400 Unit(s)/Hr IV Continuous <Continuous>  lisinopril 20 milliGRAM(s) Oral every 24 hours  metoprolol succinate ER 25 milliGRAM(s) Oral every 12 hours  pantoprazole    Tablet 40 milliGRAM(s) Oral before breakfast      PHYSICAL EXAM:  T(C): 36.7 (06-27-19 @ 05:34), Max: 36.9 (06-26-19 @ 21:59)  HR: 60 (06-27-19 @ 05:06) (58 - 77)  BP: 120/72 (06-27-19 @ 05:06) (120/72 - 189/86)  RR: 24 (06-27-19 @ 05:06) (16 - 27)  SpO2: 99% (06-27-19 @ 05:06) (92% - 99%)    HEENT: NCAT, EOMI, PERRLA  NECK: No JVD, No carotid bruits B/L, +2 Carotid pulses B/L  PULM:  CTA B/L No W/R/R  CARD: RRR, +S1, +S2, No M/R/G  ABD: ND, +BS, NT, no masses  EXT: Warm, no pedal edema   NEURO: A & O x 3, no focal neurologic deficits  PULSES: R        FEM        DP      PT      LABS:                     13.3   7.90  )-----------( 204      ( 27 Jun 2019 04:48 )             41.8     140  |  104  |  20  ----------------------------<  86  4.1   |  22  |  0.99    Ca    10.0      27 Jun 2019 04:48  Mg     2.0     06-27  TPro  7.9  /  Alb  4.2  /  TBili  0.5  /  DBili  x   /  AST  29  /  ALT  20  /  AlkPhos  78  06-26  CARDIAC MARKERS ( 27 Jun 2019 04:48 )  x     / 0.05 ng/mL / 208 U/L / x     / 5.8 ng/mL  CARDIAC MARKERS ( 26 Jun 2019 20:14 )  x     / 0.04 ng/mL / 103 U/L / x     / 5.0 ng/mL  CARDIAC MARKERS ( 26 Jun 2019 16:39 )  x     / 0.04 ng/mL / 124 U/L / x     / 6.7 ng/mL  CARDIAC MARKERS ( 26 Jun 2019 12:03 )  x     / 0.05 ng/mL / 144 U/L / x     / 7.2 ng/mL      A/P: 83yoF with a PMHx of CAD c/b multiple MI's s/p CABG 2003 and multiple PCIs with stents placed, PVD (7 peripheral LE stents), CHF (EF 45-50% Jan 2014), HTN, HLD, asthma and depression who now presents for cardiac catheterization with possible intervention if clinically indicated, in light of pts risk factors, CCS class III anginal symptoms and known NSTEMI.    -ASA 3, Mallampati 3  -pt loaded with ASA 325mg and Plavix 600mg yesterday on arrival, will give ASA 81mg and Plavix 75mg prior to cath  -IVF hydration 1/2 NS @ 75cc/hour, given CHF hx with an EF 45-50%  -pre-cath consented  -case discussed with Dr. Rose and made aware of cellulitis     Sedation Plan:  Moderate  Patient Is Suitable Candidate For Sedation?  Yes      Risks & benefits of procedure and sedation and risks and benefits for the alternative therapy have been explained to the patient in layman’s terms including but not limited to: allergic reaction, bleeding, infection, arrhythmia, respiratory compromise, renal and vascular compromise, limb damage, MI, CVA, emergent CABG/Vascular Surgery and death. Informed consent obtained and in chart. Interventional Cardiology PA Precath Note      HPI: 83yoF with a PMHx of CAD c/b multiple MI's s/p CABG 2003 and multiple PCIs with stents placed, PVD (7 peripheral LE stents), CHF (EF 45-50% in 7/27/17), HTN, HLD, asthma and depression who presents from her outpatient cardiologist's office with chest pain worsening over the last 3 weeks. Pain is 7/10, substernal, nonradiating. It feels like the chest pain she's had in the past. She also admits to pain to palpation and a "funny feeling" in her RLE.  Pt admits to not taking any of her medication for one month, including her DAPT and other cardiac meds, due to feeling depressed. ROS otherwise negative for fever, chills, SOB, cough, n/v/c/d. In the ED: HR 67, /77, HR 67, RR 16 sat 97% on RA. EKG with new TWI in lateral leads and old Q waves in V1/V2. Trop 0.05, BNP 1810. Loaded with 600 plavix/325 ASA, no heparin given, CXR WNL. Pt admitted to [facility] for further management of NSTEMI.   CE trended with a peak trop of 0.05 and pt was started on a Heparin gtt. Vascular was consulted for questionable ischemic leg, and found to not have an ischemic leg, however pt was started on cefazolin 1gram for cellulitis. Pt now referred for recommended cardiac catheterization with possible intervention if clinically indicated, in light of pts risk factors, CCS class III anginal symptoms and known NSTEMI.      ALL:  colchicine (Other; Swelling)  erythromycin (Other)  Minocin (Swelling)       MEDS:   aspirin enteric coated 81 milliGRAM(s) Oral daily  atorvastatin 80 milliGRAM(s) Oral at bedtime  ceFAZolin   IVPB 1000 milliGRAM(s) IV Intermittent every 12 hours  clopidogrel Tablet 75 milliGRAM(s) Oral daily  docusate sodium 100 milliGRAM(s) Oral three times a day  folic acid 1 milliGRAM(s) Oral daily  heparin  Infusion 400 Unit(s)/Hr IV Continuous <Continuous>  lisinopril 20 milliGRAM(s) Oral every 24 hours  metoprolol succinate ER 25 milliGRAM(s) Oral every 12 hours  pantoprazole    Tablet 40 milliGRAM(s) Oral before breakfast      PHYSICAL EXAM:  T(C): 36.7 (06-27-19 @ 05:34), Max: 36.9 (06-26-19 @ 21:59)  HR: 60 (06-27-19 @ 05:06) (58 - 77)  BP: 120/72 (06-27-19 @ 05:06) (120/72 - 189/86)  RR: 24 (06-27-19 @ 05:06) (16 - 27)  SpO2: 99% (06-27-19 @ 05:06) (92% - 99%)    HEENT: NCAT, EOMI, PERRLA  NECK: No JVD, No carotid bruits B/L, +2 Carotid pulses B/L  PULM:  CTA B/L No W/R/R  CARD: RRR, +S1, +S2, No M/R/G  ABD: ND, +BS, NT, no masses  EXT: Warm, no pedal edema   NEURO: A & O x 3, no focal neurologic deficits  PULSES: R        FEM        DP      PT      LABS:                     13.3   7.90  )-----------( 204      ( 27 Jun 2019 04:48 )             41.8     140  |  104  |  20  ----------------------------<  86  4.1   |  22  |  0.99    Ca    10.0      27 Jun 2019 04:48  Mg     2.0     06-27  TPro  7.9  /  Alb  4.2  /  TBili  0.5  /  DBili  x   /  AST  29  /  ALT  20  /  AlkPhos  78  06-26  CARDIAC MARKERS ( 27 Jun 2019 04:48 )  x     / 0.05 ng/mL / 208 U/L / x     / 5.8 ng/mL  CARDIAC MARKERS ( 26 Jun 2019 20:14 )  x     / 0.04 ng/mL / 103 U/L / x     / 5.0 ng/mL  CARDIAC MARKERS ( 26 Jun 2019 16:39 )  x     / 0.04 ng/mL / 124 U/L / x     / 6.7 ng/mL  CARDIAC MARKERS ( 26 Jun 2019 12:03 )  x     / 0.05 ng/mL / 144 U/L / x     / 7.2 ng/mL      A/P: 83yoF with a PMHx of CAD c/b multiple MI's s/p CABG 2003 and multiple PCIs with stents placed, PVD (7 peripheral LE stents), CHF (EF 45-50% Jan 2014), HTN, HLD, asthma and depression who now presents for cardiac catheterization with possible intervention if clinically indicated, in light of pts risk factors, CCS class III anginal symptoms and known NSTEMI.    -ASA 3, Mallampati 3  -pt loaded with ASA 325mg and Plavix 600mg yesterday on arrival, will give ASA 81mg and Plavix 75mg prior to cath  -IVF hydration 1/2 NS @ 75cc/hour, given CHF hx with an EF 45-50%  -pre-cath consented  -case discussed with Dr. Rose and made aware of cellulitis and med non compliance    Sedation Plan:  Moderate  Patient Is Suitable Candidate For Sedation?  Yes      Risks & benefits of procedure and sedation and risks and benefits for the alternative therapy have been explained to the patient in layman’s terms including but not limited to: allergic reaction, bleeding, infection, arrhythmia, respiratory compromise, renal and vascular compromise, limb damage, MI, CVA, emergent CABG/Vascular Surgery and death. Informed consent obtained and in chart. Interventional Cardiology PA Precath Note      HPI: 83yoF with a PMHx of CAD c/b multiple MI's s/p CABG 2003 and multiple PCIs with stents placed, PVD (7 peripheral LE stents), CHF (EF 45-50% in 7/27/17), HTN, HLD, asthma and depression who presents from her outpatient cardiologist's office with chest pain worsening over the last 3 weeks. Pain is 7/10, substernal, nonradiating. It feels like the chest pain she's had in the past. She also admits to pain to palpation and a "funny feeling" in her RLE.  Pt admits to not taking any of her medication for one month, including her DAPT and other cardiac meds, due to feeling depressed. ROS otherwise negative for fever, chills, SOB, cough, n/v/c/d. In the ED: HR 67, /77, HR 67, RR 16 sat 97% on RA. EKG with new TWI in lateral leads and old Q waves in V1/V2. Trop 0.05, BNP 1810. Loaded with 600 plavix/325 ASA, no heparin given, CXR WNL. Pt admitted to [facility] for further management of NSTEMI.   CE trended with a peak trop of 0.05 and pt was started on a Heparin gtt. Vascular was consulted for questionable ischemic leg, and found to not have an ischemic leg, however pt was started on cefazolin 1gram for cellulitis. Pt now referred for recommended cardiac catheterization with possible intervention if clinically indicated, in light of pts risk factors, CCS class III anginal symptoms and known NSTEMI.      ALL:  colchicine (Other; Swelling)  erythromycin (Other)  Minocin (Swelling)       MEDS:   aspirin enteric coated 81 milliGRAM(s) Oral daily  atorvastatin 80 milliGRAM(s) Oral at bedtime  ceFAZolin   IVPB 1000 milliGRAM(s) IV Intermittent every 12 hours  clopidogrel Tablet 75 milliGRAM(s) Oral daily  docusate sodium 100 milliGRAM(s) Oral three times a day  folic acid 1 milliGRAM(s) Oral daily  heparin  Infusion 400 Unit(s)/Hr IV Continuous <Continuous>  lisinopril 20 milliGRAM(s) Oral every 24 hours  metoprolol succinate ER 25 milliGRAM(s) Oral every 12 hours  pantoprazole    Tablet 40 milliGRAM(s) Oral before breakfast      PHYSICAL EXAM:  T(C): 36.7 (06-27-19 @ 05:34), Max: 36.9 (06-26-19 @ 21:59)  HR: 60 (06-27-19 @ 05:06) (58 - 77)  BP: 120/72 (06-27-19 @ 05:06) (120/72 - 189/86)  RR: 24 (06-27-19 @ 05:06) (16 - 27)  SpO2: 99% (06-27-19 @ 05:06) (92% - 99%)    HEENT: NCAT, EOMI, PERRLA  NECK: No JVD, No carotid bruits B/L, +2 Carotid pulses B/L  PULM:  CTA B/L No W/R/R  CARD: RRR, +S1, +S2, No M/R/G  ABD: ND, +BS, NT, no masses  EXT: Warm, no pedal edema   NEURO: A & O x 3, no focal neurologic deficits  PULSES: R 2+ B/L       FEM 2+ no bruits B/L      DP 1+ B/L      PT R faint L 1+      LABS:                     13.3   7.90  )-----------( 204      ( 27 Jun 2019 04:48 )             41.8     140  |  104  |  20  ----------------------------<  86  4.1   |  22  |  0.99    Ca    10.0      27 Jun 2019 04:48  Mg     2.0     06-27  TPro  7.9  /  Alb  4.2  /  TBili  0.5  /  DBili  x   /  AST  29  /  ALT  20  /  AlkPhos  78  06-26  CARDIAC MARKERS ( 27 Jun 2019 04:48 )  x     / 0.05 ng/mL / 208 U/L / x     / 5.8 ng/mL  CARDIAC MARKERS ( 26 Jun 2019 20:14 )  x     / 0.04 ng/mL / 103 U/L / x     / 5.0 ng/mL  CARDIAC MARKERS ( 26 Jun 2019 16:39 )  x     / 0.04 ng/mL / 124 U/L / x     / 6.7 ng/mL  CARDIAC MARKERS ( 26 Jun 2019 12:03 )  x     / 0.05 ng/mL / 144 U/L / x     / 7.2 ng/mL      A/P: 83yoF with a PMHx of CAD c/b multiple MI's s/p CABG 2003 and multiple PCIs with stents placed, PVD (7 peripheral LE stents), CHF (EF 45-50% Jan 2014), HTN, HLD, asthma and depression who now presents for cardiac catheterization with possible intervention if clinically indicated, in light of pts risk factors, CCS class III anginal symptoms and known NSTEMI.    -ASA 3, Mallampati 3  -pt loaded with ASA 325mg and Plavix 600mg yesterday on arrival, will give ASA 81mg and Plavix 75mg prior to cath  -IVF hydration 1/2 NS @ 75cc/hour, given CHF hx with an EF 45-50%  -pre-cath consented  -case discussed with Dr. Rose and made aware of cellulitis and med non compliance    Sedation Plan:  Moderate  Patient Is Suitable Candidate For Sedation?  Yes      Risks & benefits of procedure and sedation and risks and benefits for the alternative therapy have been explained to the patient in layman’s terms including but not limited to: allergic reaction, bleeding, infection, arrhythmia, respiratory compromise, renal and vascular compromise, limb damage, MI, CVA, emergent CABG/Vascular Surgery and death. Informed consent obtained and in chart.

## 2019-06-28 ENCOUNTER — TRANSCRIPTION ENCOUNTER (OUTPATIENT)
Age: 84
End: 2019-06-28

## 2019-06-28 LAB
ANION GAP SERPL CALC-SCNC: 10 MMOL/L — SIGNIFICANT CHANGE UP (ref 5–17)
BUN SERPL-MCNC: 19 MG/DL — SIGNIFICANT CHANGE UP (ref 7–23)
CALCIUM SERPL-MCNC: 9.4 MG/DL — SIGNIFICANT CHANGE UP (ref 8.4–10.5)
CHLORIDE SERPL-SCNC: 106 MMOL/L — SIGNIFICANT CHANGE UP (ref 96–108)
CO2 SERPL-SCNC: 22 MMOL/L — SIGNIFICANT CHANGE UP (ref 22–31)
CREAT SERPL-MCNC: 0.96 MG/DL — SIGNIFICANT CHANGE UP (ref 0.5–1.3)
CULTURE RESULTS: NO GROWTH — SIGNIFICANT CHANGE UP
GLUCOSE BLDC GLUCOMTR-MCNC: 100 MG/DL — HIGH (ref 70–99)
GLUCOSE BLDC GLUCOMTR-MCNC: 110 MG/DL — HIGH (ref 70–99)
GLUCOSE BLDC GLUCOMTR-MCNC: 111 MG/DL — HIGH (ref 70–99)
GLUCOSE BLDC GLUCOMTR-MCNC: 139 MG/DL — HIGH (ref 70–99)
GLUCOSE SERPL-MCNC: 135 MG/DL — HIGH (ref 70–99)
HCT VFR BLD CALC: 38.8 % — SIGNIFICANT CHANGE UP (ref 34.5–45)
HGB BLD-MCNC: 12.4 G/DL — SIGNIFICANT CHANGE UP (ref 11.5–15.5)
MAGNESIUM SERPL-MCNC: 1.9 MG/DL — SIGNIFICANT CHANGE UP (ref 1.6–2.6)
MCHC RBC-ENTMCNC: 29.2 PG — SIGNIFICANT CHANGE UP (ref 27–34)
MCHC RBC-ENTMCNC: 32 GM/DL — SIGNIFICANT CHANGE UP (ref 32–36)
MCV RBC AUTO: 91.3 FL — SIGNIFICANT CHANGE UP (ref 80–100)
NRBC # BLD: 0 /100 WBCS — SIGNIFICANT CHANGE UP (ref 0–0)
PLATELET # BLD AUTO: 188 K/UL — SIGNIFICANT CHANGE UP (ref 150–400)
POTASSIUM SERPL-MCNC: 4.2 MMOL/L — SIGNIFICANT CHANGE UP (ref 3.5–5.3)
POTASSIUM SERPL-SCNC: 4.2 MMOL/L — SIGNIFICANT CHANGE UP (ref 3.5–5.3)
RBC # BLD: 4.25 M/UL — SIGNIFICANT CHANGE UP (ref 3.8–5.2)
RBC # FLD: 13.5 % — SIGNIFICANT CHANGE UP (ref 10.3–14.5)
SODIUM SERPL-SCNC: 138 MMOL/L — SIGNIFICANT CHANGE UP (ref 135–145)
SPECIMEN SOURCE: SIGNIFICANT CHANGE UP
WBC # BLD: 8.11 K/UL — SIGNIFICANT CHANGE UP (ref 3.8–10.5)
WBC # FLD AUTO: 8.11 K/UL — SIGNIFICANT CHANGE UP (ref 3.8–10.5)

## 2019-06-28 PROCEDURE — 99232 SBSQ HOSP IP/OBS MODERATE 35: CPT | Mod: 25

## 2019-06-28 PROCEDURE — 99223 1ST HOSP IP/OBS HIGH 75: CPT | Mod: GC

## 2019-06-28 PROCEDURE — 99232 SBSQ HOSP IP/OBS MODERATE 35: CPT

## 2019-06-28 RX ORDER — METOPROLOL TARTRATE 50 MG
25 TABLET ORAL ONCE
Refills: 0 | Status: COMPLETED | OUTPATIENT
Start: 2019-06-28 | End: 2019-06-28

## 2019-06-28 RX ORDER — LATANOPROST 0.05 MG/ML
1 SOLUTION/ DROPS OPHTHALMIC; TOPICAL AT BEDTIME
Refills: 0 | Status: DISCONTINUED | OUTPATIENT
Start: 2019-06-28 | End: 2019-06-29

## 2019-06-28 RX ORDER — METOPROLOL TARTRATE 50 MG
50 TABLET ORAL DAILY
Refills: 0 | Status: DISCONTINUED | OUTPATIENT
Start: 2019-06-29 | End: 2019-06-29

## 2019-06-28 RX ORDER — LACTULOSE 10 G/15ML
10 SOLUTION ORAL ONCE
Refills: 0 | Status: COMPLETED | OUTPATIENT
Start: 2019-06-28 | End: 2019-06-28

## 2019-06-28 RX ORDER — ENOXAPARIN SODIUM 100 MG/ML
40 INJECTION SUBCUTANEOUS EVERY 24 HOURS
Refills: 0 | Status: DISCONTINUED | OUTPATIENT
Start: 2019-06-28 | End: 2019-06-29

## 2019-06-28 RX ADMIN — LATANOPROST 1 DROP(S): 0.05 SOLUTION/ DROPS OPHTHALMIC; TOPICAL at 21:30

## 2019-06-28 RX ADMIN — Medication 100 MILLIGRAM(S): at 11:45

## 2019-06-28 RX ADMIN — SIMETHICONE 80 MILLIGRAM(S): 80 TABLET, CHEWABLE ORAL at 09:20

## 2019-06-28 RX ADMIN — POLYETHYLENE GLYCOL 3350 17 GRAM(S): 17 POWDER, FOR SOLUTION ORAL at 06:50

## 2019-06-28 RX ADMIN — Medication 1 MILLIGRAM(S): at 11:45

## 2019-06-28 RX ADMIN — Medication 25 MILLIGRAM(S): at 18:46

## 2019-06-28 RX ADMIN — Medication 100 MILLIGRAM(S): at 00:05

## 2019-06-28 RX ADMIN — Medication 100 MILLIGRAM(S): at 06:50

## 2019-06-28 RX ADMIN — LISINOPRIL 20 MILLIGRAM(S): 2.5 TABLET ORAL at 16:42

## 2019-06-28 RX ADMIN — Medication 100 MILLIGRAM(S): at 21:30

## 2019-06-28 RX ADMIN — SIMETHICONE 80 MILLIGRAM(S): 80 TABLET, CHEWABLE ORAL at 21:30

## 2019-06-28 RX ADMIN — ENOXAPARIN SODIUM 40 MILLIGRAM(S): 100 INJECTION SUBCUTANEOUS at 15:18

## 2019-06-28 RX ADMIN — Medication 81 MILLIGRAM(S): at 11:45

## 2019-06-28 RX ADMIN — PANTOPRAZOLE SODIUM 40 MILLIGRAM(S): 20 TABLET, DELAYED RELEASE ORAL at 06:50

## 2019-06-28 RX ADMIN — LACTULOSE 10 GRAM(S): 10 SOLUTION ORAL at 15:19

## 2019-06-28 RX ADMIN — Medication 100 MILLIGRAM(S): at 15:18

## 2019-06-28 RX ADMIN — CLOPIDOGREL BISULFATE 75 MILLIGRAM(S): 75 TABLET, FILM COATED ORAL at 11:46

## 2019-06-28 RX ADMIN — Medication 100 MILLIGRAM(S): at 23:56

## 2019-06-28 RX ADMIN — ATORVASTATIN CALCIUM 80 MILLIGRAM(S): 80 TABLET, FILM COATED ORAL at 21:30

## 2019-06-28 RX ADMIN — Medication 25 MILLIGRAM(S): at 09:20

## 2019-06-28 NOTE — PROGRESS NOTE ADULT - PROBLEM SELECTOR PLAN 8
- h/o 3 thyroid nodules, pt reports good follow up. Was getting trended imaging to monitor size of nodules.
- h/o 3 thyroid nodules, pt reports good follow up. Was getting trended imaging to monitor size of nodules.

## 2019-06-28 NOTE — PROGRESS NOTE ADULT - PROBLEM SELECTOR PLAN 5
- 's, currently asymptomatic. Pt no longer had CP by time of exam  - will restart pt's BB and ACE as above  - goal 140's/150's
- 's, currently asymptomatic. Pt no longer had CP by time of exam  - will restart pt's BB and ACE as above  - goal 140's/150's

## 2019-06-28 NOTE — PROGRESS NOTE ADULT - ASSESSMENT
83F with PMH of CAD (s/p CABG 2003) and multiple PCIs with stents, peripheral vascular disease (multiple stents in b/l LEs), CHF, HTN, HLD, Gout, admitted to the hospital for an NSTEMI, with concern for ischemic leg.    Recommendations:  - No acute vascular surgery intervention at this time, as patient's leg is not ischemic, palpable pulses  - Recommend continuing IV abx of choice for RLE cellulitis  - Recommend Dermatology consult for biopsy of RLE mole  - Vascular signing off at this time  - Please call [phone] with questions or reconsult
83yoF with a PMH of CAD c/b multiple MI's s/p CABG 2003 and multiple PCIs with stents placed, PVD (7 peripheral LE stents), CHF (EF 45-50% Jan 2014), HTN, HLD, asthma, depression who presents from her outpatient cardiologist's office with chest pain worsening over the last 3 weeks. Admitted for NSTEMI management.     - RLE tender to palpation out of proportion + "funny feeling in leg"   - color of RLE is slightly different. More erythematous, not mottled  - 2+ peripheral pulses B/L, warm to palpation,   - 2/6 classic P's risk factors  - vascular consult  - arterial doppler LE b/l    # Black skin lesion  - pt states she was supposed to get it biopsed  - concerning for melenoma  - can be workup up outpatient
83yoF with a PMH of CAD c/b multiple MI's s/p CABG 2003 and multiple PCIs with stents placed, PVD (7 peripheral LE stents), CHF (EF 45-50% Jan 2014), HTN, HLD, asthma, depression who presents from her outpatient cardiologist's office with chest pain worsening over the last 3 weeks. Admitted for NSTEMI management.     - RLE tender to palpation out of proportion + "funny feeling in leg"   - color of RLE is slightly different. More erythematous, not mottled  - 2+ peripheral pulses B/L, warm to palpation,   - 2/6 classic P's risk factors  - vascular consult  - arterial doppler LE b/l    # Black skin lesion  - pt states she was supposed to get it biopsed  - concerning for melenoma  - can be workup up outpatient

## 2019-06-28 NOTE — DISCHARGE NOTE PROVIDER - HOSPITAL COURSE
83yoF with a PMH of CAD c/b multiple MI's s/p CABG 2003 and multiple PCIs with stents placed, PVD (7 peripheral LE stents), CHF (EF 45-50% Jan 2014), HTN, HLD, asthma, depression who presents from her outpatient cardiologist's office with chest pain worsening over the last 3 weeks. Admitted for ACS r/o. She was off all of her meds for 1mo due to feeling depressed per patient. EKG on arrival + for T wave inversions. Trop on arrival 0.5, which stayed flat. She was loaded with / Plavix 600 and a heparin drip was started. Echo revealed EF 40% mid anteroseptal wall w/ severe hypokinesis, apical septal wall severe hypokinesis, moderately hypokinetic. PASP 37. Pt was catheterized 6/27/19, CHRISTI to LCx 95% stenosed, all else patent, right radial access, recommended to c/w asa/plavix. 83yoF with a PMH of CAD c/b multiple MI's s/p CABG 2003 and multiple PCIs with stents placed, PVD (7 peripheral LE stents), CHF (EF 45-50% Jan 2014), HTN, HLD, asthma, depression who presents from her outpatient cardiologist's office with chest pain worsening over the last 3 weeks. Admitted for ACS r/o. She was off all of her meds for 1mo due to feeling depressed per patient. EKG on arrival + for T wave inversions. Trop on arrival 0.5, which stayed flat. She was loaded with / Plavix 600 and a heparin drip was started. Echo revealed EF 40% mid anteroseptal wall w/ severe hypokinesis, apical septal wall severe hypokinesis, moderately hypokinetic. PASP 37. Pt was catheterized 6/27/19, CHRISTI to LCx 95% stenosed, all else patent, right radial access, recommended to c/w asa/plavix.  Beta blocker increased to 50mg qD. Patient noted to have LE discoloration and 2cm skin lesion. Vascular consulted for LE discoloration r/o ischemic etiology but recommended no intervention. Patient to follow up as outpatient for derm evaluation and biopsy of skin lesion. patient started on IV antibiotics for cellulits and has completed 4 days- will send for additional 3 days of keflex. Patient seen by PT and determined stable for discharge home with no needs. Patient remains hemodynamically stable for further management as outpatient and follow up with cardiologist.

## 2019-06-28 NOTE — PROGRESS NOTE ADULT - PROBLEM SELECTOR PLAN 10
1) PCP Contacted on Admission: (Y/N) --> Name & Phone #:  2) Date of Contact with PCP: TBD  3) PCP Contacted at Discharge: TBD  4) Summary of Handoff Given to PCP: TBD   5) Post-Discharge Appointment Date: TBD
1) PCP Contacted on Admission: (Y/N) --> Name & Phone #:  2) Date of Contact with PCP: TBD  3) PCP Contacted at Discharge: TBD  4) Summary of Handoff Given to PCP: TBD   5) Post-Discharge Appointment Date: TBD

## 2019-06-28 NOTE — PROGRESS NOTE ADULT - PROBLEM SELECTOR PLAN 1
- h/o multiple MI's s/p PCI w/ stents and CABG. Pt has been off all medications for one month. Now presenting with substernal 7/10 CP  - TANJA 5, 26%  - EKG w/ TWI in lateral leads  - s/p load plavix 600/  - trops flat at 0.4/0.5  PLAN  - cath 6/27: CHRISTI pLCx (95%), dLM 30-50% (IVUS >8.5), patent pLAD and mLAD stents, Ramus 99% (with patent graft), pRCA  (L-R collaterals), LIMA-LAD graft atretic, Radial-Ramus graft patent, SVG-RCA graft occluded (known), EDP 18. Access R radial  - c/w DAPT.   -Discontinued Heparin  - HTN and HLD management as below
- h/o multiple MI's s/p PCI w/ stents and CABG. Pt has been off all medications for one month. Now presenting with substernal 7/10 CP  - TANJA 5, 26%  - EKG w/ TWI in lateral leads  - s/p load plavix 600/  - trops flat at 0.4/0.5  PLAN  - cath today  - c/w DAPT. Hep ggt. Goal PTT 53-73  - HTN and HLD management as below

## 2019-06-28 NOTE — DISCHARGE NOTE PROVIDER - NSDCCPCAREPLAN_GEN_ALL_CORE_FT
PRINCIPAL DISCHARGE DIAGNOSIS  Diagnosis: Non-ST elevation MI (NSTEMI)  Assessment and Plan of Treatment: -When you came to the hospital, you were found to have a myocardial infarction.  You received a stent to one of your coronary arteries.  Please continue to take aspirin 81mg daily, plavix 75mg daily and lipitor 80mg daily and follow up with Dr. Rodriguez.      SECONDARY DISCHARGE DIAGNOSES  Diagnosis: Prediabetes  Assessment and Plan of Treatment: -You have a history of prediabetes, continue metformin 500mg daily    Diagnosis: HLD (hyperlipidemia)  Assessment and Plan of Treatment: -You have a history of hyperlipidemia, please continue lipitor 80mg daily, aspirin 81mg daily and plavix 75mg daily.    Diagnosis: HTN (hypertension)  Assessment and Plan of Treatment: -You have a history of high blood pressure, please continue to take quina    Diagnosis: Congestive heart failure  Assessment and Plan of Treatment: PRINCIPAL DISCHARGE DIAGNOSIS  Diagnosis: Non-ST elevation MI (NSTEMI)  Assessment and Plan of Treatment: -When you came to the hospital, you were found to have a myocardial infarction.  You received a stent to one of your coronary arteries.  Please continue to take aspirin 81mg daily, plavix 75mg daily and lipitor 80mg daily. Your Toprol was switched from 25mg tablet twice a day to a 50mg tablet once a day.  Follow up with your cardiologist Dr. Rosa Billings.      SECONDARY DISCHARGE DIAGNOSES  Diagnosis: Cellulitis  Assessment and Plan of Treatment: You were found to have an infection of the soft tissues of your right lower extremity. You were started on IV antibiotics. You were seen by the vascular surgeons with no recommendation for intervention. You will be sent home on 3 additional days of keflex. Please follow up with your primary doctor for further evaluation.    Diagnosis: Prediabetes  Assessment and Plan of Treatment: -You have a history of prediabetes, continue metformin 500mg daily    Diagnosis: HLD (hyperlipidemia)  Assessment and Plan of Treatment: -You have a history of hyperlipidemia, please continue lipitor 80mg daily.    Diagnosis: HTN (hypertension)  Assessment and Plan of Treatment: -You have a history of high blood pressure, please continue to take your quinapril and your Toprol was switched from 25 mg twice a day to 50 mg once a day. Your imdur was held and restart as tolerated by the direction of your cardiologist.

## 2019-06-28 NOTE — PHYSICAL THERAPY INITIAL EVALUATION ADULT - PERTINENT HX OF CURRENT PROBLEM, REHAB EVAL
83yoF with a PMH of CAD c/b multiple MI's s/p CABG 2003 and multiple PCIs with stents placed, PVD (7 peripheral LE stents), CHF (EF 45-50% Jan 2014), HTN, HLD, asthma, depression who presents from her outpatient cardiologist's office with chest pain worsening over the last 3 weeks.

## 2019-06-28 NOTE — DISCHARGE NOTE PROVIDER - CARE PROVIDER_API CALL
Cami Rodriguez)  Internal Medicine  [address] [place] [id number]  Phone: [phone]  Fax: [phone]  Follow Up Time:

## 2019-06-28 NOTE — PROGRESS NOTE ADULT - PROBLEM SELECTOR PLAN 4
- Jun 2017 EF 45-50%, medication noncompliant. Was supposed to be taking quinapril 20, Toprol XL 25 BID, Isosorbide 30 daily, lasix 40mg PO daily, and lipitor 80  - repeat echo 6/27: :EF 40%.mid anteroseptal wall severe hypokinesis, apical septal wall severe hypokinesis, moderately hypokinetic, PASP 37.  - strict I/O, daily weight  - currently euvolemic, will hold off on diuretics - Jun 2017 EF 45-50%, medication noncompliant. Was supposed to be taking lisinopril 20, Toprol XL 25 BID, lasix 40mg PO daily, and lipitor 80  - repeat echo 6/27: :EF 40%.mid anteroseptal wall severe hypokinesis, apical septal wall severe hypokinesis, moderately hypokinetic, PASP 37.  - strict I/O, daily weight  - currently euvolemic, will hold off on diuretics

## 2019-06-28 NOTE — PROGRESS NOTE ADULT - PROBLEM SELECTOR PLAN 7
- h/o preDM. Was on metformin 500 ER once daily  - ISS and FS's
- h/o preDM. Was on metformin 500 ER once daily  - ISS and FS's

## 2019-06-28 NOTE — PROGRESS NOTE ADULT - PROBLEM SELECTOR PLAN 9
.  F: none  E: PRN  N: DASH fluid limit 1.5  GI: PPI  DVT: Lovenox  glucose: ISS  bowel: none  dispo: 5L  code: full
.  F: none  E: PRN  N: DASH fluid limit 1.5  GI: PPI  DVT: hep ggt  glucose: ISS  bowel: none  dispo: 5L  code: full

## 2019-06-28 NOTE — PROGRESS NOTE ADULT - PROBLEM SELECTOR PLAN 2
- s/p multiple MI's, PCI's w/ stents and CABG 2003  - c/w aspirin 81, plavix 75, lipitor 80, isosorbide 30 PO daily, quinapril 20 - s/p multiple MI's, PCI's w/ stents and CABG 2003  - c/w aspirin 81, plavix 75, lipitor 80, atorvastatin 80 mg daily

## 2019-06-28 NOTE — PROGRESS NOTE ADULT - SUBJECTIVE AND OBJECTIVE BOX
24 hour/ OVERNIGHT EVENTS: Patient sent for echo and cardiac cath on . Echo:EF 40%.mid anteroseptal wall severe hypokinesis, apical septal wall severe hypokinesis, moderately hypokinetic, PASP 37. Cath: CHRISTI pLCx (95%), dLM 30-50% (IVUS >8.5), patent pLAD and mLAD stents, Ramus 99% (with patent graft), pRCA  (L-R collaterals), LIMA-LAD graft atretic, Radial-Ramus graft patent, SVG-RCA graft occluded (known), EDP 18. Access R radial @ 630pm. No Events overnight.     SUBJECTIVE / INTERVAL HPI: Patient seen and examined at bedside. No new acute complaints. Patient reports improvement of RLE leg cramping and continued constipation not improved since yesterday. Patient denies CP, cough, sob, palpitations, light-headedness, fever, N/V.     Review of systems negative except as noted above.     VITAL SIGNS:  Vital Signs Last 24 Hrs  T(C): 37.1 (2019 05:18), Max: 37.3 (2019 21:55)  T(F): 98.7 (2019 05:18), Max: 99.1 (2019 21:55)  HR: 72 (2019 09:11) (52 - 72)  BP: 151/104 (2019 09:11) (119/57 - 158/81)  BP(mean): 139 (2019 09:11) (79 - 139)  RR: 31 (2019 09:11) (17 - 64)  SpO2: 99% (2019 09:11) (96% - 99%)    19 @ 07:01  -  19 @ 07:00  --------------------------------------------------------  IN: 412 mL / OUT: 1125 mL / NET: -713 mL      PHYSICAL EXAM:    General: WDWN, resting comfortably on bed at time of exam in NAD  HEENT: NC/AT; anicteric sclera  Cardiovascular: +S1/S2; RRR no m/g/r, No JVD  Respiratory: CTA B/L; no R/W/R  Gastrointestinal: soft, ND, +BS, TTP over midline, no rebounding/guarding   Extremities: WWP; small hematoma over r radial artery-no bruit, No edema  Vascular: 2+ radial, DP pulses B/L  Neurological: AAOx3; no focal deficits    MEDICATIONS:  MEDICATIONS  (STANDING):  artificial  tears Solution 1 Drop(s) Both EYES two times a day  aspirin enteric coated 81 milliGRAM(s) Oral daily  atorvastatin 80 milliGRAM(s) Oral at bedtime  ceFAZolin   IVPB 1000 milliGRAM(s) IV Intermittent every 12 hours  clopidogrel Tablet 75 milliGRAM(s) Oral daily  dextrose 5%. 1000 milliLiter(s) (50 mL/Hr) IV Continuous <Continuous>  dextrose 50% Injectable 12.5 Gram(s) IV Push once  dextrose 50% Injectable 25 Gram(s) IV Push once  dextrose 50% Injectable 25 Gram(s) IV Push once  docusate sodium 100 milliGRAM(s) Oral three times a day  folic acid 1 milliGRAM(s) Oral daily  insulin lispro (HumaLOG) corrective regimen sliding scale   SubCutaneous Before meals and at bedtime  lisinopril 20 milliGRAM(s) Oral every 24 hours  metoprolol succinate ER 25 milliGRAM(s) Oral every 12 hours  pantoprazole    Tablet 40 milliGRAM(s) Oral before breakfast  polyethylene glycol 3350 17 Gram(s) Oral every 24 hours  simethicone 80 milliGRAM(s) Chew two times a day    MEDICATIONS  (PRN):  acetaminophen   Tablet .. 650 milliGRAM(s) Oral every 6 hours PRN Moderate Pain (4 - 6)  dextrose 40% Gel 15 Gram(s) Oral once PRN Blood Glucose LESS THAN 70 milliGRAM(s)/deciliter  glucagon  Injectable 1 milliGRAM(s) IntraMuscular once PRN Glucose LESS THAN 70 milligrams/deciliter      ALLERGIES:  Allergies    colchicine (Other; Swelling)  erythromycin (Other)  Minocin (Swelling)    Intolerances        LABS:                        13.3   7.90  )-----------( 204      ( 2019 04:48 )             41.8     06-27    140  |  104  |  20  ----------------------------<  86  4.1   |  22  |  0.99    Ca    10.0      2019 04:48  Mg     2.0         TPro  7.9  /  Alb  4.2  /  TBili  0.5  /  DBili  x   /  AST  29  /  ALT  20  /  AlkPhos  78  06-    PT/INR - ( 2019 04:48 )   PT: 11.6 sec;   INR: 1.03          PTT - ( 2019 11:55 )  PTT:82.1 sec  Urinalysis Basic - ( 2019 12:23 )    Color: Yellow / Appearance: Clear / S.015 / pH: x  Gluc: x / Ketone: 15 mg/dL  / Bili: Negative / Urobili: 0.2 E.U./dL   Blood: x / Protein: NEGATIVE mg/dL / Nitrite: NEGATIVE   Leuk Esterase: Small / RBC: < 5 /HPF / WBC > 10 /HPF   Sq Epi: x / Non Sq Epi: 0-5 /HPF / Bacteria: Present /HPF      CAPILLARY BLOOD GLUCOSE      POCT Blood Glucose.: 100 mg/dL (2019 06:57)    CARDIAC MARKERS ( 2019 11:55 )  x     / 0.05 ng/mL / 177 U/L / x     / 5.4 ng/mL  CARDIAC MARKERS ( 2019 04:48 )  x     / 0.05 ng/mL / 208 U/L / x     / 5.8 ng/mL  CARDIAC MARKERS ( 2019 20:14 )  x     / 0.04 ng/mL / 103 U/L / x     / 5.0 ng/mL  CARDIAC MARKERS ( 2019 16:39 )  x     / 0.04 ng/mL / 124 U/L / x     / 6.7 ng/mL  CARDIAC MARKERS ( 2019 12:03 )  x     / 0.05 ng/mL / 144 U/L / x     / 7.2 ng/mL        RADIOLOGY & ADDITIONAL TESTS: Reviewed.

## 2019-06-29 ENCOUNTER — INBOUND DOCUMENT (OUTPATIENT)
Age: 84
End: 2019-06-29

## 2019-06-29 ENCOUNTER — TRANSCRIPTION ENCOUNTER (OUTPATIENT)
Age: 84
End: 2019-06-29

## 2019-06-29 VITALS
SYSTOLIC BLOOD PRESSURE: 122 MMHG | RESPIRATION RATE: 15 BRPM | HEART RATE: 58 BPM | DIASTOLIC BLOOD PRESSURE: 65 MMHG | OXYGEN SATURATION: 98 %

## 2019-06-29 DIAGNOSIS — I50.9 HEART FAILURE, UNSPECIFIED: ICD-10-CM

## 2019-06-29 LAB
ANION GAP SERPL CALC-SCNC: 8 MMOL/L — SIGNIFICANT CHANGE UP (ref 5–17)
BUN SERPL-MCNC: 18 MG/DL — SIGNIFICANT CHANGE UP (ref 7–23)
CALCIUM SERPL-MCNC: 9.6 MG/DL — SIGNIFICANT CHANGE UP (ref 8.4–10.5)
CHLORIDE SERPL-SCNC: 106 MMOL/L — SIGNIFICANT CHANGE UP (ref 96–108)
CO2 SERPL-SCNC: 25 MMOL/L — SIGNIFICANT CHANGE UP (ref 22–31)
CREAT SERPL-MCNC: 1.02 MG/DL — SIGNIFICANT CHANGE UP (ref 0.5–1.3)
GLUCOSE BLDC GLUCOMTR-MCNC: 100 MG/DL — HIGH (ref 70–99)
GLUCOSE BLDC GLUCOMTR-MCNC: 100 MG/DL — HIGH (ref 70–99)
GLUCOSE SERPL-MCNC: 104 MG/DL — HIGH (ref 70–99)
HCT VFR BLD CALC: 37.5 % — SIGNIFICANT CHANGE UP (ref 34.5–45)
HGB BLD-MCNC: 12.1 G/DL — SIGNIFICANT CHANGE UP (ref 11.5–15.5)
MAGNESIUM SERPL-MCNC: 1.9 MG/DL — SIGNIFICANT CHANGE UP (ref 1.6–2.6)
MCHC RBC-ENTMCNC: 29.2 PG — SIGNIFICANT CHANGE UP (ref 27–34)
MCHC RBC-ENTMCNC: 32.3 GM/DL — SIGNIFICANT CHANGE UP (ref 32–36)
MCV RBC AUTO: 90.4 FL — SIGNIFICANT CHANGE UP (ref 80–100)
NRBC # BLD: 0 /100 WBCS — SIGNIFICANT CHANGE UP (ref 0–0)
PLATELET # BLD AUTO: 162 K/UL — SIGNIFICANT CHANGE UP (ref 150–400)
POTASSIUM SERPL-MCNC: 4.7 MMOL/L — SIGNIFICANT CHANGE UP (ref 3.5–5.3)
POTASSIUM SERPL-SCNC: 4.7 MMOL/L — SIGNIFICANT CHANGE UP (ref 3.5–5.3)
RBC # BLD: 4.15 M/UL — SIGNIFICANT CHANGE UP (ref 3.8–5.2)
RBC # FLD: 13.5 % — SIGNIFICANT CHANGE UP (ref 10.3–14.5)
SODIUM SERPL-SCNC: 139 MMOL/L — SIGNIFICANT CHANGE UP (ref 135–145)
WBC # BLD: 7.57 K/UL — SIGNIFICANT CHANGE UP (ref 3.8–10.5)
WBC # FLD AUTO: 7.57 K/UL — SIGNIFICANT CHANGE UP (ref 3.8–10.5)

## 2019-06-29 PROCEDURE — 99239 HOSP IP/OBS DSCHRG MGMT >30: CPT

## 2019-06-29 RX ORDER — LACTULOSE 10 G/15ML
15 SOLUTION ORAL
Qty: 450 | Refills: 0
Start: 2019-06-29 | End: 2019-07-13

## 2019-06-29 RX ORDER — LACTULOSE 10 G/15ML
15 SOLUTION ORAL
Qty: 1 | Refills: 0
Start: 2019-06-29 | End: 2019-07-13

## 2019-06-29 RX ORDER — SIMETHICONE 80 MG/1
1 TABLET, CHEWABLE ORAL
Qty: 0 | Refills: 0 | DISCHARGE
Start: 2019-06-29

## 2019-06-29 RX ORDER — DOCUSATE SODIUM 100 MG
1 CAPSULE ORAL
Qty: 0 | Refills: 0 | DISCHARGE
Start: 2019-06-29

## 2019-06-29 RX ORDER — MAGNESIUM SULFATE 500 MG/ML
1 VIAL (ML) INJECTION ONCE
Refills: 0 | Status: COMPLETED | OUTPATIENT
Start: 2019-06-29 | End: 2019-06-29

## 2019-06-29 RX ORDER — POLYETHYLENE GLYCOL 3350 17 G/17G
17 POWDER, FOR SOLUTION ORAL
Qty: 0 | Refills: 0 | DISCHARGE
Start: 2019-06-29

## 2019-06-29 RX ORDER — METOPROLOL TARTRATE 50 MG
1 TABLET ORAL
Qty: 30 | Refills: 0
Start: 2019-06-29 | End: 2019-07-28

## 2019-06-29 RX ORDER — CEPHALEXIN 500 MG
1 CAPSULE ORAL
Qty: 12 | Refills: 0
Start: 2019-06-29 | End: 2019-07-01

## 2019-06-29 RX ORDER — METOPROLOL TARTRATE 50 MG
1 TABLET ORAL
Qty: 0 | Refills: 0 | DISCHARGE

## 2019-06-29 RX ADMIN — PANTOPRAZOLE SODIUM 40 MILLIGRAM(S): 20 TABLET, DELAYED RELEASE ORAL at 06:33

## 2019-06-29 RX ADMIN — Medication 100 MILLIGRAM(S): at 12:03

## 2019-06-29 RX ADMIN — CLOPIDOGREL BISULFATE 75 MILLIGRAM(S): 75 TABLET, FILM COATED ORAL at 11:57

## 2019-06-29 RX ADMIN — Medication 100 MILLIGRAM(S): at 06:33

## 2019-06-29 RX ADMIN — Medication 50 MILLIGRAM(S): at 12:02

## 2019-06-29 RX ADMIN — Medication 650 MILLIGRAM(S): at 00:01

## 2019-06-29 RX ADMIN — Medication 81 MILLIGRAM(S): at 11:57

## 2019-06-29 RX ADMIN — Medication 650 MILLIGRAM(S): at 01:00

## 2019-06-29 RX ADMIN — ENOXAPARIN SODIUM 40 MILLIGRAM(S): 100 INJECTION SUBCUTANEOUS at 12:03

## 2019-06-29 RX ADMIN — Medication 1 MILLIGRAM(S): at 11:57

## 2019-06-29 RX ADMIN — Medication 100 GRAM(S): at 09:14

## 2019-06-29 RX ADMIN — SIMETHICONE 80 MILLIGRAM(S): 80 TABLET, CHEWABLE ORAL at 10:27

## 2019-06-29 NOTE — DISCHARGE NOTE NURSING/CASE MANAGEMENT/SOCIAL WORK - NSDCDPATPORTLINK_GEN_ALL_CORE
You can access the [facility] Portal, offered by [facility], by registering with the following website: [email or web address]

## 2019-07-03 ENCOUNTER — APPOINTMENT (OUTPATIENT)
Age: 84
End: 2019-07-03
Payer: MEDICARE

## 2019-07-03 VITALS
DIASTOLIC BLOOD PRESSURE: 70 MMHG | OXYGEN SATURATION: 98 % | TEMPERATURE: 99.5 F | BODY MASS INDEX: 21.42 KG/M2 | HEART RATE: 78 BPM | WEIGHT: 106.25 LBS | SYSTOLIC BLOOD PRESSURE: 160 MMHG | RESPIRATION RATE: 18 BRPM | HEIGHT: 59 IN

## 2019-07-03 PROCEDURE — 99348 HOME/RES VST EST LOW MDM 30: CPT

## 2019-07-03 NOTE — COUNSELING
[Weight management counseling provided] : Weight management [Fall prevention counseling provided] : fall prevention  [Activity counseling provided] : activity [Healthy eating counseling provided] : healthy eating [Adequate lighting] : Adequate lighting

## 2019-07-08 RX ORDER — CAPSAICIN 0.03 G/100G
0.03 CREAM TOPICAL 4 TIMES DAILY
Qty: 1 | Refills: 2 | Status: DISCONTINUED | COMMUNITY
Start: 2018-03-08 | End: 2019-07-08

## 2019-07-08 RX ORDER — MIRTAZAPINE 7.5 MG/1
7.5 TABLET, FILM COATED ORAL
Qty: 30 | Refills: 3 | Status: DISCONTINUED | COMMUNITY
Start: 2017-10-19 | End: 2019-07-08

## 2019-07-08 RX ORDER — FOLIC ACID 1 MG/1
1 TABLET ORAL
Refills: 0 | Status: DISCONTINUED | COMMUNITY
End: 2019-07-08

## 2019-07-08 RX ORDER — CITALOPRAM HYDROBROMIDE 10 MG/1
10 TABLET, FILM COATED ORAL
Refills: 0 | Status: ACTIVE | COMMUNITY

## 2019-07-08 RX ORDER — FUROSEMIDE 40 MG/1
40 TABLET ORAL
Refills: 0 | Status: DISCONTINUED | COMMUNITY
End: 2019-07-08

## 2019-07-08 RX ORDER — PANTOPRAZOLE 20 MG/1
20 TABLET, DELAYED RELEASE ORAL DAILY
Qty: 30 | Refills: 3 | Status: DISCONTINUED | COMMUNITY
End: 2019-07-08

## 2019-07-08 RX ORDER — BLOOD-GLUCOSE METER
KIT MISCELLANEOUS
Qty: 1 | Refills: 0 | Status: DISCONTINUED | COMMUNITY
Start: 2017-11-06 | End: 2019-07-08

## 2019-07-08 RX ORDER — DICLOFENAC SODIUM 10 MG/G
1 GEL TOPICAL DAILY
Qty: 1 | Refills: 2 | Status: DISCONTINUED | COMMUNITY
Start: 2018-02-08 | End: 2019-07-08

## 2019-07-09 ENCOUNTER — APPOINTMENT (OUTPATIENT)
Dept: INTERNAL MEDICINE | Facility: CLINIC | Age: 84
End: 2019-07-09

## 2019-07-09 DIAGNOSIS — E04.1 NONTOXIC SINGLE THYROID NODULE: ICD-10-CM

## 2019-07-09 DIAGNOSIS — I50.32 CHRONIC DIASTOLIC (CONGESTIVE) HEART FAILURE: ICD-10-CM

## 2019-07-09 DIAGNOSIS — I25.2 OLD MYOCARDIAL INFARCTION: ICD-10-CM

## 2019-07-09 DIAGNOSIS — I25.110 ATHEROSCLEROTIC HEART DISEASE OF NATIVE CORONARY ARTERY WITH UNSTABLE ANGINA PECTORIS: ICD-10-CM

## 2019-07-09 DIAGNOSIS — I25.710 ATHEROSCLEROSIS OF AUTOLOGOUS VEIN CORONARY ARTERY BYPASS GRAFT(S) WITH UNSTABLE ANGINA PECTORIS: ICD-10-CM

## 2019-07-09 DIAGNOSIS — Z79.82 LONG TERM (CURRENT) USE OF ASPIRIN: ICD-10-CM

## 2019-07-09 DIAGNOSIS — H40.9 UNSPECIFIED GLAUCOMA: ICD-10-CM

## 2019-07-09 DIAGNOSIS — Z88.8 ALLERGY STATUS TO OTHER DRUGS, MEDICAMENTS AND BIOLOGICAL SUBSTANCES: ICD-10-CM

## 2019-07-09 DIAGNOSIS — I11.0 HYPERTENSIVE HEART DISEASE WITH HEART FAILURE: ICD-10-CM

## 2019-07-09 DIAGNOSIS — F32.9 MAJOR DEPRESSIVE DISORDER, SINGLE EPISODE, UNSPECIFIED: ICD-10-CM

## 2019-07-09 DIAGNOSIS — L98.9 DISORDER OF THE SKIN AND SUBCUTANEOUS TISSUE, UNSPECIFIED: ICD-10-CM

## 2019-07-09 DIAGNOSIS — Z91.14 PATIENT'S OTHER NONCOMPLIANCE WITH MEDICATION REGIMEN: ICD-10-CM

## 2019-07-09 DIAGNOSIS — M90.80 OSTEOPATHY IN DISEASES CLASSIFIED ELSEWHERE, UNSPECIFIED SITE: ICD-10-CM

## 2019-07-09 DIAGNOSIS — I73.9 PERIPHERAL VASCULAR DISEASE, UNSPECIFIED: ICD-10-CM

## 2019-07-09 DIAGNOSIS — Z79.02 LONG TERM (CURRENT) USE OF ANTITHROMBOTICS/ANTIPLATELETS: ICD-10-CM

## 2019-07-09 DIAGNOSIS — Z95.820 PERIPHERAL VASCULAR ANGIOPLASTY STATUS WITH IMPLANTS AND GRAFTS: ICD-10-CM

## 2019-07-09 DIAGNOSIS — R73.03 PREDIABETES: ICD-10-CM

## 2019-07-09 DIAGNOSIS — L03.115 CELLULITIS OF RIGHT LOWER LIMB: ICD-10-CM

## 2019-07-09 DIAGNOSIS — M10.9 GOUT, UNSPECIFIED: ICD-10-CM

## 2019-07-09 DIAGNOSIS — I21.4 NON-ST ELEVATION (NSTEMI) MYOCARDIAL INFARCTION: ICD-10-CM

## 2019-07-09 DIAGNOSIS — E83.31 FAMILIAL HYPOPHOSPHATEMIA: ICD-10-CM

## 2019-07-09 DIAGNOSIS — J45.909 UNSPECIFIED ASTHMA, UNCOMPLICATED: ICD-10-CM

## 2019-07-09 DIAGNOSIS — Z79.84 LONG TERM (CURRENT) USE OF ORAL HYPOGLYCEMIC DRUGS: ICD-10-CM

## 2019-07-09 DIAGNOSIS — Z95.5 PRESENCE OF CORONARY ANGIOPLASTY IMPLANT AND GRAFT: ICD-10-CM

## 2019-07-09 DIAGNOSIS — Z96.653 PRESENCE OF ARTIFICIAL KNEE JOINT, BILATERAL: ICD-10-CM

## 2019-07-09 DIAGNOSIS — I25.790 ATHEROSCLEROSIS OF OTHER CORONARY ARTERY BYPASS GRAFT(S) WITH UNSTABLE ANGINA PECTORIS: ICD-10-CM

## 2019-07-09 DIAGNOSIS — E78.5 HYPERLIPIDEMIA, UNSPECIFIED: ICD-10-CM

## 2019-07-09 DIAGNOSIS — I25.82 CHRONIC TOTAL OCCLUSION OF CORONARY ARTERY: ICD-10-CM

## 2019-07-09 NOTE — HEALTH RISK ASSESSMENT
[Low Fat Diet] : low fat [Poor Adherence] : poor adherence [Alone] : lives alone [Feels Safe at Home] : Feels safe at home [Fully functional (bathing, dressing, toileting, transferring, walking, feeding)] : Fully functional (bathing, dressing, toileting, transferring, walking, feeding) [] : No [FreeTextEntry2] : Admits to using salt when cooking, reinforced to follow a no or low sodium diet.

## 2019-07-09 NOTE — REVIEW OF SYSTEMS
[Fatigue] : fatigue [Negative] : Heme/Lymph [de-identified] : Right LE cellulitis improved, small dry skin lesion noted, no drainage, non-tender, non- erythema

## 2019-07-09 NOTE — HISTORY OF PRESENT ILLNESS
[Post-hospitalization from ___ Hospital] : Post-hospitalization from [unfilled] Hospital [Discharge Summary] : discharge summary [Pertinent Labs] : pertinent labs [Discharge Med List] : discharge medication list [Med Reconciliation] : medication reconciliation has been completed [Patient Contacted By: ____] : and contacted by [unfilled] [Admitted on: ___] : The patient was admitted on [unfilled] [Discharged on ___] : discharged on [unfilled] [FreeTextEntry2] : As per [facility] Discharge Summary:\par 83yoF with a PMH of CAD c/b multiple MI's s/p CABG 2003 and multiple PCIs with stents placed, PVD (7 peripheral LE stents), CHF (EF 45-50% Jan 2014), HTN, HLD, asthma, depression who presents from her outpatient cardiologist's office with chest pain worsening over the last 3 weeks. Admitted for ACS r/o. She was off all of her meds for 1mo due to feeling depressed per patient. EKG on arrival + for T wave inversions. Trop on arrival 0.5, which stayed flat. She was loaded with / Plavix 600 and a heparin drip was started. Echo revealed EF 40% mid anteroseptal wall w/ severe hypokinesis, apical septal wall severe hypokinesis, moderately hypokinetic. PASP 37. Pt was catheterized 6/27/19, CHRISTI to LCx 95% stenosed, all else patent, right radial access, recommended to c/w asa/plavix. Beta blocker increased to 50mg qD. Patient noted to have LE discoloration and 2cm skin lesion. Vascular consulted for LE discoloration r/o ischemic etiology but recommended no intervention. Patient to follow up as outpatient for derm evaluation and biopsy of skin lesion. patient started on IV antibiotics for cellulitis and has completed 4 days- will send for additional 3 days of keflex. Patient seen by PT and determined stable for discharge home with no needs. Patient remains hemodynamically stable for further management as outpatient and follow up with cardiologist.  The patient notes fatigue.  She is in no acute distress with no c/o chest pain or SOB.

## 2019-07-11 PROCEDURE — 74018 RADEX ABDOMEN 1 VIEW: CPT

## 2019-07-11 PROCEDURE — 71046 X-RAY EXAM CHEST 2 VIEWS: CPT

## 2019-07-11 PROCEDURE — 99285 EMERGENCY DEPT VISIT HI MDM: CPT | Mod: 25

## 2019-07-11 PROCEDURE — 80048 BASIC METABOLIC PNL TOTAL CA: CPT

## 2019-07-11 PROCEDURE — 82962 GLUCOSE BLOOD TEST: CPT

## 2019-07-11 PROCEDURE — C1887: CPT

## 2019-07-11 PROCEDURE — 83880 ASSAY OF NATRIURETIC PEPTIDE: CPT

## 2019-07-11 PROCEDURE — C1753: CPT

## 2019-07-11 PROCEDURE — 81001 URINALYSIS AUTO W/SCOPE: CPT

## 2019-07-11 PROCEDURE — 85610 PROTHROMBIN TIME: CPT

## 2019-07-11 PROCEDURE — 85025 COMPLETE CBC W/AUTO DIFF WBC: CPT

## 2019-07-11 PROCEDURE — 83735 ASSAY OF MAGNESIUM: CPT

## 2019-07-11 PROCEDURE — 85027 COMPLETE CBC AUTOMATED: CPT

## 2019-07-11 PROCEDURE — C1769: CPT

## 2019-07-11 PROCEDURE — 97161 PT EVAL LOW COMPLEX 20 MIN: CPT

## 2019-07-11 PROCEDURE — 85730 THROMBOPLASTIN TIME PARTIAL: CPT

## 2019-07-11 PROCEDURE — 82553 CREATINE MB FRACTION: CPT

## 2019-07-11 PROCEDURE — 84484 ASSAY OF TROPONIN QUANT: CPT

## 2019-07-11 PROCEDURE — C1874: CPT

## 2019-07-11 PROCEDURE — 93005 ELECTROCARDIOGRAM TRACING: CPT

## 2019-07-11 PROCEDURE — 36415 COLL VENOUS BLD VENIPUNCTURE: CPT

## 2019-07-11 PROCEDURE — 93306 TTE W/DOPPLER COMPLETE: CPT

## 2019-07-11 PROCEDURE — 87086 URINE CULTURE/COLONY COUNT: CPT

## 2019-07-11 PROCEDURE — 80053 COMPREHEN METABOLIC PANEL: CPT

## 2019-07-11 PROCEDURE — 82550 ASSAY OF CK (CPK): CPT

## 2019-07-11 PROCEDURE — C1725: CPT

## 2019-07-11 PROCEDURE — C1894: CPT

## 2019-07-11 PROCEDURE — 83036 HEMOGLOBIN GLYCOSYLATED A1C: CPT

## 2019-07-22 ENCOUNTER — APPOINTMENT (OUTPATIENT)
Dept: HEART AND VASCULAR | Facility: CLINIC | Age: 84
End: 2019-07-22

## 2019-07-30 VITALS
WEIGHT: 106 LBS | HEART RATE: 68 BPM | RESPIRATION RATE: 18 BRPM | BODY MASS INDEX: 21.37 KG/M2 | OXYGEN SATURATION: 99 % | DIASTOLIC BLOOD PRESSURE: 60 MMHG | TEMPERATURE: 97.6 F | HEIGHT: 59 IN | SYSTOLIC BLOOD PRESSURE: 106 MMHG

## 2019-07-31 RX ORDER — CEPHALEXIN 500 MG/1
500 CAPSULE ORAL
Refills: 0 | Status: DISCONTINUED | COMMUNITY
End: 2019-07-31

## 2019-07-31 RX ORDER — ISOSORBIDE MONONITRATE 30 MG
30 TABLET, EXTENDED RELEASE 24 HR ORAL DAILY
Refills: 0 | Status: DISCONTINUED | COMMUNITY
End: 2019-07-31

## 2019-07-31 NOTE — ASSESSMENT
[FreeTextEntry1] : 83yoF with a PMH of CAD c/b multiple MI's s/p CABG 2003 and multiple PCIs with stents placed, PVD (7 peripheral LE stents), CHF (EF 45-50% Jan 2014), HTN, HLD, asthma, depression who presents from her outpatient cardiologist's office with chest pain worsening over the last 3 weeks.  The patient +NSTEMI, S/P cardiac catheterization on 6/27/19, CHRISTI to LCx.  The patient is stable and in no acute distress.\par

## 2019-07-31 NOTE — HISTORY OF PRESENT ILLNESS
[FreeTextEntry2] :  As per [facility] Discharge Summary:\par 83yoF with a PMH of CAD c/b multiple MI's s/p CABG 2003 and multiple PCIs with stents placed, PVD (7 peripheral LE stents), CHF (EF 45-50% Jan 2014), HTN, HLD, asthma, depression who presents from her outpatient cardiologist's office with chest pain worsening over the last 3 weeks. Admitted for ACS r/o. She was off all of her meds for 1mo due to feeling depressed per patient. EKG on arrival + for T wave inversions. Trop on arrival 0.5, which stayed flat. She was loaded with / Plavix 600 and a heparin drip was started. Echo revealed EF 40% mid anteroseptal wall w/ severe hypokinesis, apical septal wall severe hypokinesis, moderately hypokinetic. PASP 37. Pt was catheterized 6/27/19, CHRISTI to LCx 95% stenosed, all else patent, right radial access, recommended to c/w asa/plavix. Beta blocker increased to 50mg qD. Patient noted to have LE discoloration and 2cm skin lesion. Vascular consulted for LE discoloration r/o ischemic etiology but recommended no intervention. Patient to follow up as outpatient for derm evaluation and biopsy of skin lesion. patient started on IV antibiotics for cellulitis and has completed 4 days- will send for additional 3 days of keflex. Patient seen by PT and determined stable for discharge home with no needs. Patient remains hemodynamically stable for further management as outpatient and follow up with cardiologist. \par \par The patient was seen today with [place] Care Coordinator from [facility].  She is in no acute distress.  No c/o chest pain, palpitations, diaphoresis.  She notes when walks long distances she feels short of breath but resolves when she takes a rest.  The patient has seen her cardiologist Dr. Rosa Billings and her PCP Dr. Reyes.  Of note: she has an appointment to see her dermatologist re: right LE skin lesion.

## 2019-07-31 NOTE — PLAN
[FreeTextEntry1] : CV stable\par Continue current CV medication regimen\par [facility] ([phone] [place] or [phone]) initiated to assist the patient with her medication administration\par Reviewed with the patient the importance of attending her follow-up appointment with her Cardiologist and PCP\par She has confirmed her appointment to see the dermatologist re: right lower extremity skin lesion\par She has met her goals of care for the [facility] and reviewed with the patient that she will be discharged from [facility] and has [facility] of [facility] services that will continue services to include RN, PT.  I spoke with her nurse Forrest Mayes who will schedule a follow up visit with her this week.  All concerns can be followed up with her Cardiologist and PCP.  \par I called the patient's daughter to update her on this visit, unable to leave a message will follow up with Jaimee [phone].\par The patient verbalized adequate understanding with positive teach back.\par

## 2019-07-31 NOTE — HEALTH RISK ASSESSMENT
[de-identified] : Cardioogist and PCP [] : No [de-identified] : Ambulatory and walks [[id number]] : 0 [de-identified] : Regular, heart healthy diet [FreeTextEntry1] : Reinforced importance of low salt diet [Change in mental status noted] : No change in mental status noted [de-identified] : Periods of forgetfulness [de-identified] : Family involved with care [FreeTextEntry2] : Worked at [employer]

## 2019-10-08 ENCOUNTER — APPOINTMENT (OUTPATIENT)
Dept: ENDOCRINOLOGY | Facility: CLINIC | Age: 84
End: 2019-10-08
Payer: MEDICARE

## 2019-10-08 VITALS
DIASTOLIC BLOOD PRESSURE: 72 MMHG | SYSTOLIC BLOOD PRESSURE: 137 MMHG | HEART RATE: 76 BPM | WEIGHT: 110 LBS | BODY MASS INDEX: 22.18 KG/M2 | HEIGHT: 59 IN

## 2019-10-08 PROCEDURE — 82962 GLUCOSE BLOOD TEST: CPT

## 2019-10-08 PROCEDURE — 99213 OFFICE O/P EST LOW 20 MIN: CPT | Mod: 25

## 2019-10-10 NOTE — HISTORY OF PRESENT ILLNESS
[FreeTextEntry1] : 4/24/17 US FNA Preliminary ultrasound images confirm the presence of 1.0 cm nodule in the upper pole of the left lobe of the thyroid; Benign Findings: Adams Category II\par 8/22/17, A1c 6%, ldl-c 152, s.creat 1.09\par 10/2017 A1c 6%\par 12/10/18 POCT A1c 5.8%\par 12/18/18 iPTH 79, Ca 10.1, cholesterol 257, LDL-c 167, s. creat 1.08, Vit D 25-OH 22, \par 2/8/19 Vit B12 753, s. creat 1.15, cholesterol 212, LDL-c 138, Vit D 25-OH 25.1, \par \par 85 y/o F pt, /72, BMI 22.22, with Hx of T2DM (dx in 2016) and DM related complications of: peripheral neuropathy, CAD- cardiac stent (in 2017), CKD. \par Significant PMHx: thyroid nodules with negative FNA biopsy in 2017.\par Other PMHx: hypercholesterolemia, HTN, \par Follows with nephrologist, rheumatologist, cardiologist.\par Referred to see psychiatrist for depression and forgetfulness\par Last funduscopic visit: recently as per pt \par Last cardiologist visit: in 9/2019, as per pt\par Pt participated in the [facility], and was discharged from it to f/u with her PCP/Geriatric Dr. Reyes. \par  \par 10/8/19\par Pt's last endocrine f/u was in 12/2018 with a POCT A1c of 5.8% and was recommended to regulate her lifestyle and diet. She states she had her 3rd heart attack in 6/2019 during her appointment here; a paramedic was called and pt was taken to the hospital. Pt is being closely followed by cardiologist and geriatric team.\par \par Today pt presents for endocrine f/u, feeling "not right" with c/o "bowels giving her trouble recently." Pt states she had a colonoscopy done recently and did a blood test with her Cardiologist in 9/2019 (pt declined blood test today). \par Pt states she is not taking any medications for DM currently; she notes she used to be on Metformin in the past but DC/ed it 2/2 good BS.\par Denies polyuria and CP.\par \par Current Medications: Metformin (was DC/ed in 2017), Statins 40 mg daily

## 2019-10-10 NOTE — ASSESSMENT
[Hypoglycemia Management] : hypoglycemia management [FreeTextEntry1] : 85 y/o F pt with:\par 1. Hx of T2DM, with POCT 99:\par Pt had a general blood test done by Cardiologist; she declined labs today. \par Recommend pt continue with DM diet control, along with have a f/u with cardiologist, internist, and geriatric doctors. \par \par Return in 1 yr [Importance of Diet and Exercise] : importance of diet and exercise to improve glycemic control, achieve weight loss and improve cardiovascular health

## 2019-10-10 NOTE — PHYSICAL EXAM
[Normal Sclera/Conjunctiva] : normal sclera/conjunctiva [Alert] : alert [No Respiratory Distress] : no respiratory distress [Normal Outer Ear/Nose] : the ears and nose were normal in appearance [Normal PMI] : the apical impulse was normal [Normal Bowel Sounds] : normal bowel sounds [Kyphosis] : kyphosis present [No Stigmata of Cushings Syndrome] : no stigmata of cushings syndrome [Cranial Nerves Intact] : cranial nerves 2-12 were intact [Oriented x3] : oriented to person, place, and time [de-identified] : thyroid nodules [de-identified] : 1+ pitting edema on b/l lower extremities

## 2019-10-10 NOTE — ADDENDUM
[FreeTextEntry1] : I, Merritt Jose, acted solely as a scribe for Dr. Denton Mckenzie on this date. 10/08/2019.

## 2019-10-10 NOTE — END OF VISIT
[FreeTextEntry3] : All medical record entries made by the Scribe were at my, Dr. Denton Mckenzie, direction and personally dictated by me on 10/08/2019. I have reviewed the chart and agree that the record accurately reflects my personal performance of the history, physical exam, assessment and plan. I have also personally directed, reviewed and agreed with the chart.  [Time Spent: ___ minutes] : I have spent [unfilled] minutes of face to face time with the patient [>50% of Time Spent on Counseling for ____] : Greater than 50% of the encounter time was spent on counseling for [unfilled]

## 2019-11-20 ENCOUNTER — APPOINTMENT (OUTPATIENT)
Dept: NEPHROLOGY | Facility: CLINIC | Age: 84
End: 2019-11-20
Payer: MEDICARE

## 2019-11-20 VITALS
SYSTOLIC BLOOD PRESSURE: 140 MMHG | HEART RATE: 72 BPM | WEIGHT: 112.5 LBS | DIASTOLIC BLOOD PRESSURE: 70 MMHG | BODY MASS INDEX: 22.72 KG/M2

## 2019-11-20 PROCEDURE — 99214 OFFICE O/P EST MOD 30 MIN: CPT

## 2019-11-20 RX ORDER — METOPROLOL TARTRATE 25 MG/1
25 TABLET, FILM COATED ORAL
Refills: 0 | Status: DISCONTINUED | COMMUNITY
End: 2019-11-20

## 2019-11-20 RX ORDER — LACTULOSE 10 G/15ML
10 SOLUTION ORAL
Refills: 0 | Status: DISCONTINUED | COMMUNITY
End: 2019-11-20

## 2019-11-20 NOTE — REVIEW OF SYSTEMS
[Feeling Tired] : feeling tired [Arthralgias] : arthralgias [Eyesight Problems] : eyesight problems [As Noted in HPI] : as noted in HPI [Joint Pain] : joint pain [Negative] : Psychiatric [FreeTextEntry8] : increased creatinine

## 2019-11-20 NOTE — HISTORY OF PRESENT ILLNESS
[FreeTextEntry1] : 85 yo woman with HTN, CKD, CAD, DMT2, hyperlipidemia, here for f/u evaluation\par suffered MI  6/2019-  feeling weak at times.\par  going out less frequently\par weight down stabilized but she is concerned that it has dropped from 130s to 110s- appetite fair as she still gets abd pains- \par does have h/o diverticular disease, constipation and prior GERD- sees GI and Geriatrics regularly at [facility]\par + microhematuria hematuria, \par Denies flank pain, dysuria, hematuria or frothy urine \par no SOB, DELUCA\par \par PMH: \par Had colonoscopy 10/19/2018 + diverticular disease\par had urodynamics at [facility]- does not recall results\par  MRI brain possible "ministrokes" or early dementia\par Has 4 cardiac and 4 left leg and 3 right leg stents

## 2019-11-20 NOTE — PHYSICAL EXAM
[Sclera] : the sclera and conjunctiva were normal [General Appearance - Alert] : alert [Examination Of The Oral Cavity] : the lips and gums were normal [Outer Ear] : the ears and nose were normal in appearance [] : no respiratory distress [Neck Cervical Mass (___cm)] : no neck mass was observed [Heart Rate And Rhythm] : heart rate was normal and rhythm regular [Heart Sounds] : normal S1 and S2 [Auscultation Breath Sounds / Voice Sounds] : lungs were clear to auscultation bilaterally [Murmurs] : no murmurs [Heart Sounds Gallop] : no gallops [Heart Sounds Pericardial Friction Rub] : no pericardial rub [Cervical Lymph Nodes Enlarged Posterior Bilaterally] : posterior cervical [Cervical Lymph Nodes Enlarged Anterior Bilaterally] : anterior cervical [Supraclavicular Lymph Nodes Enlarged Bilaterally] : supraclavicular [No CVA Tenderness] : no ~M costovertebral angle tenderness [No Spinal Tenderness] : no spinal tenderness [Involuntary Movements] : no involuntary movements were seen [No Focal Deficits] : no focal deficits [Skin Color & Pigmentation] : normal skin color and pigmentation [Impaired Insight] : insight and judgment were intact [Oriented To Time, Place, And Person] : oriented to person, place, and time [Affect] : the affect was normal [FreeTextEntry1] : 1+ lower leg edema R>L

## 2019-11-20 NOTE — ASSESSMENT
[FreeTextEntry1] : all lab data was reviewed with patient in detail from 2/8/2019- sending for new labs now with urine sample as well\par 83 yo woman with CKD 3, HTN, LE edema- ongoing GI and urine frequency/incontinence issues- \par for  followup as above\par -CKD 3- clinically stable- her symptoms of N, V malaise unlikely to be related to her CKD 3-\par need reports from - she will forward\par - LE edema- reminded that it is okay to take extra lasix once weekly as needed if edema  increases\par -hematuria- sees - creat stable- on plavix\par -proteinuria-  send u/a Upro/creat ratio\par  send serologies if  hematuria and proteinuria persist\par -CKD 3-  concern that  issues contributing to azotemia- for  f/u as above\par f/u 6 months

## 2020-01-03 NOTE — ED PROVIDER NOTE - DIAGNOSTIC INTERPRETATION
Influenza Vaccination
CHEST XRAY INTERPRETATION: lungs clear, heart shadow normal, bony structures intact

## 2020-03-19 ENCOUNTER — APPOINTMENT (OUTPATIENT)
Dept: NEPHROLOGY | Facility: CLINIC | Age: 85
End: 2020-03-19

## 2020-04-08 LAB — GLUCOSE BLDC GLUCOMTR-MCNC: 99

## 2020-08-14 ENCOUNTER — LABORATORY RESULT (OUTPATIENT)
Age: 85
End: 2020-08-14

## 2020-08-14 ENCOUNTER — APPOINTMENT (OUTPATIENT)
Dept: NEPHROLOGY | Facility: CLINIC | Age: 85
End: 2020-08-14
Payer: MEDICARE

## 2020-08-14 VITALS
SYSTOLIC BLOOD PRESSURE: 164 MMHG | BODY MASS INDEX: 22.22 KG/M2 | HEART RATE: 68 BPM | DIASTOLIC BLOOD PRESSURE: 68 MMHG | WEIGHT: 110 LBS

## 2020-08-14 DIAGNOSIS — Z00.00 ENCOUNTER FOR GENERAL ADULT MEDICAL EXAMINATION W/OUT ABNORMAL FINDINGS: ICD-10-CM

## 2020-08-14 LAB
ALBUMIN SERPL ELPH-MCNC: 4.4 G/DL
ANION GAP SERPL CALC-SCNC: 14 MMOL/L
APPEARANCE: CLEAR
BACTERIA: NEGATIVE
BILIRUBIN URINE: NEGATIVE
BLOOD URINE: NEGATIVE
BUN SERPL-MCNC: 25 MG/DL
CALCIUM SERPL-MCNC: 10.1 MG/DL
CHLORIDE SERPL-SCNC: 105 MMOL/L
CO2 SERPL-SCNC: 24 MMOL/L
COLOR: YELLOW
CREAT SERPL-MCNC: 1.02 MG/DL
CREAT SPEC-SCNC: 134 MG/DL
GLUCOSE QUALITATIVE U: NEGATIVE
GLUCOSE SERPL-MCNC: 89 MG/DL
HYALINE CASTS: 0 /LPF
KETONES URINE: NEGATIVE
LEUKOCYTE ESTERASE URINE: NEGATIVE
MICROALBUMIN 24H UR DL<=1MG/L-MCNC: 5.1 MG/DL
MICROALBUMIN/CREAT 24H UR-RTO: 38 MG/G
MICROSCOPIC-UA: NORMAL
NITRITE URINE: NEGATIVE
PH URINE: 7
PHOSPHATE SERPL-MCNC: 3.1 MG/DL
POTASSIUM SERPL-SCNC: 4.2 MMOL/L
PROTEIN URINE: NORMAL
RED BLOOD CELLS URINE: 0 /HPF
SODIUM ?TM SUB UR QN: 125 MMOL/L
SODIUM SERPL-SCNC: 142 MMOL/L
SPECIFIC GRAVITY URINE: 1.02
SQUAMOUS EPITHELIAL CELLS: 1 /HPF
UROBILINOGEN URINE: NORMAL
WHITE BLOOD CELLS URINE: 1 /HPF

## 2020-08-14 PROCEDURE — 99214 OFFICE O/P EST MOD 30 MIN: CPT

## 2020-08-16 NOTE — PHYSICAL EXAM
[General Appearance - Alert] : alert [Outer Ear] : the ears and nose were normal in appearance [Sclera] : the sclera and conjunctiva were normal [Examination Of The Oral Cavity] : the lips and gums were normal [Neck Cervical Mass (___cm)] : no neck mass was observed [] : no respiratory distress [Auscultation Breath Sounds / Voice Sounds] : lungs were clear to auscultation bilaterally [Heart Rate And Rhythm] : heart rate was normal and rhythm regular [Heart Sounds] : normal S1 and S2 [Heart Sounds Gallop] : no gallops [Murmurs] : no murmurs [Heart Sounds Pericardial Friction Rub] : no pericardial rub [Cervical Lymph Nodes Enlarged Posterior Bilaterally] : posterior cervical [Cervical Lymph Nodes Enlarged Anterior Bilaterally] : anterior cervical [Supraclavicular Lymph Nodes Enlarged Bilaterally] : supraclavicular [No CVA Tenderness] : no ~M costovertebral angle tenderness [No Spinal Tenderness] : no spinal tenderness [Involuntary Movements] : no involuntary movements were seen [Skin Color & Pigmentation] : normal skin color and pigmentation [No Focal Deficits] : no focal deficits [Impaired Insight] : insight and judgment were intact [Oriented To Time, Place, And Person] : oriented to person, place, and time [Affect] : the affect was normal [FreeTextEntry1] : +tr lower leg edema R>L

## 2020-08-16 NOTE — HISTORY OF PRESENT ILLNESS
[FreeTextEntry1] : 85 yo woman here for f/u evaluation of HTN, CKD, CAD, DMT2, hyperlipidemia.\par inpatient [facility] in May for heart condition- not sure if it was an MI or not-  there for 5 days\par memory not strong- cannot recall details for me now\par a bit constipated now\par ongoing investigation for her abd. pain- \par + microhematuria hematuria, \par Denies flank pain, dysuria, hematuria or frothy urine \par no SOB, DELUCA\par \par PMH: \par Had colonoscopy 10/19/2018 + diverticular disease\par had urodynamics at [facility]- does not recall results\par  MRI brain possible "ministrokes" or early dementia\par Has 4 cardiac and 4 left leg and 3 right leg stents

## 2020-08-16 NOTE — ASSESSMENT
[FreeTextEntry1] : all lab data was reviewed with patient in detail from 8/12/2020\par 83 yo woman with CKD 3, HTN, LE edema-, DM\par -CKD 3- creat stable- electrolytes within limits\par unlikely GI symptoms related to her kidney disease- \par ?diabetic gastroparesis\par -HTN-  above goal, but no meds yet- encouraged to measure BP at home and forward results\par - LE edema- a bit better using lasix as needed\par -hematuria-  most recent u/a bland\par -proteinuria-  Umicroalb/creat 33-  controlled- c/w quinapril\par \par \par f/u 3-4 months- BP check months

## 2020-08-17 LAB
SARS-COV-2 IGG SERPL IA-ACNC: 0.08 INDEX
SARS-COV-2 IGG SERPL QL IA: NEGATIVE

## 2020-10-06 ENCOUNTER — APPOINTMENT (OUTPATIENT)
Dept: ENDOCRINOLOGY | Facility: CLINIC | Age: 85
End: 2020-10-06
Payer: MEDICARE

## 2020-10-06 ENCOUNTER — MED ADMIN CHARGE (OUTPATIENT)
Age: 85
End: 2020-10-06

## 2020-10-06 ENCOUNTER — LABORATORY RESULT (OUTPATIENT)
Age: 85
End: 2020-10-06

## 2020-10-06 VITALS
DIASTOLIC BLOOD PRESSURE: 96 MMHG | BODY MASS INDEX: 22.42 KG/M2 | HEART RATE: 87 BPM | WEIGHT: 111 LBS | SYSTOLIC BLOOD PRESSURE: 156 MMHG

## 2020-10-06 DIAGNOSIS — Z87.898 PERSONAL HISTORY OF OTHER SPECIFIED CONDITIONS: ICD-10-CM

## 2020-10-06 DIAGNOSIS — R53.83 OTHER FATIGUE: ICD-10-CM

## 2020-10-06 DIAGNOSIS — E04.1 NONTOXIC SINGLE THYROID NODULE: ICD-10-CM

## 2020-10-06 PROCEDURE — 96372 THER/PROPH/DIAG INJ SC/IM: CPT

## 2020-10-06 PROCEDURE — 99215 OFFICE O/P EST HI 40 MIN: CPT | Mod: 25

## 2020-10-06 RX ADMIN — COSYNTROPIN MG: 0.25 INJECTION, POWDER, FOR SOLUTION INTRAMUSCULAR; INTRAVENOUS at 00:00

## 2020-10-07 ENCOUNTER — MED ADMIN CHARGE (OUTPATIENT)
Age: 85
End: 2020-10-07

## 2020-10-07 RX ORDER — COSYNTROPIN 0.25 MG/ML
0.25 INJECTION, POWDER, LYOPHILIZED, FOR SOLUTION INTRAVENOUS
Qty: 0 | Refills: 0 | Status: COMPLETED | OUTPATIENT
Start: 2020-10-06

## 2020-10-09 PROBLEM — R53.83 FATIGUE: Status: ACTIVE | Noted: 2020-10-06

## 2020-10-09 NOTE — ADDENDUM
[FreeTextEntry1] : I, Merritt Jose, acted solely as a scribe for Dr. Denton Mckenzie on this date. 10/06/2020.

## 2020-10-09 NOTE — END OF VISIT
[FreeTextEntry3] : All medical record entries made by the Scribe were at my, Dr. Denton Mckenzie, direction and personally dictated by me on 10/06/2020. I have reviewed the chart and agree that the record accurately reflects my personal performance of the history, physical exam, assessment and plan. I have also personally directed, reviewed and agreed with the chart.  [Time Spent: ___ minutes] : I have spent [unfilled] minutes of time on the encounter. [>50% of the face to face encounter time was spent on counseling and/or coordination of care for ___] : Greater than 50% of the face to face encounter time was spent on counseling and/or coordination of care for [unfilled]

## 2020-10-09 NOTE — ASSESSMENT
[Importance of Diet and Exercise] : importance of diet and exercise to improve glycemic control, achieve weight loss and improve cardiovascular health [Hypoglycemia Management] : hypoglycemia management [FreeTextEntry1] : 84 y/o F pt with:\par 1. c/o weakness, fatigue, weight loss, decreased appetite, generalized body/joint aches, and abd pain for ~ 1 yr:\par These symptoms have been worsening to the point where pt is feeling exhausted. She does not have symptoms of osmotic diuresis or underactive/ overactive thyroid. Her clinical picture requires to assess her adrenal function tests. Will order ACTH stimulation test to assess cortisol levels today. \par \par 2. T2DM, diet controlled:\par Pt is asymptomatic, will order A1c today. \par \par 3. Hx of thyroid nodules:\par Unremarkable thyroid exams today. Pt already had 2 FNA biopsies which were reported negative (last FNA biopsy was in 2017).\par Send TFT\par \par Return next week.  [Carbohydrate Consistent Diet] : carbohydrate consistent diet

## 2020-10-09 NOTE — HISTORY OF PRESENT ILLNESS
[FreeTextEntry1] : 4/24/17 FNA Biopsy: Preliminary ultrasound images confirm the presence of 1.0 cm nodule in the upper pole of the left lobe of the thyroid; Benign Findings: [place] Category II (pt also had an FNA biopsy on 1/28/16)\par 8/22/17 A1c 6%, ldl-c 152, s.creat 1.09\par 10/2017 A1c 6%\par 12/10/18 POCT A1c 5.8%\par 12/18/18 iPTH 79, Ca 10.1, cholesterol 257, LDL-c 167, s. creat 1.08, Vit D 25-OH 22, \par 2/8/19 Vit B12 753, s. creat 1.15, cholesterol 212, LDL-c 138, Vit D 25-OH 25.1, \par 8/12/20 microalb/creat ratio 38, \par \par 84 y/o F pt, /96, BMI 22.42, with Hx of T2DM (dx in 2016) and DM related complications of: peripheral neuropathy, CAD- cardiac stent (in 2017), CKD. \par Significant PMHx: thyroid nodules with negative FNA biopsy in 2017.\par Other PMHx: hypercholesterolemia, HTN, \par Follows with nephrologist, rheumatologist, cardiologist.\par Referred to see psychiatrist for depression and forgetfulness\par Last cardiologist visit: in 9/2019, as per pt\par Pt participated in [facility], and was discharged from it to f/u with her PCP/Geriatric Dr. Reyes. \par  \par 10/8/19\par Pt's last endocrine f/u was in 12/2018 with a POCT A1c of 5.8% and was recommended to regulate her lifestyle and diet. She states she had her 3rd heart attack in 6/2019 during her appointment here; a paramedic was called and pt was taken to the hospital. Pt is being closely followed by cardiologist and geriatric team.\par \par Today pt presents for endocrine f/u, feeling "not right" with c/o "bowels giving her trouble recently." Pt states she had a colonoscopy done recently and did a blood test with her Cardiologist in 9/2019 (pt declined blood test today). \par Pt states she is not taking any medications for DM currently; she notes she used to be on Metformin in the past but DC/ed it 2/2 good BS.\par Denies polyuria and CP.\par \par 10/6/20\par Pt's last DM f/u was in 10/2019. Her DM is diet controlled. \par \par Today pt presents for endocrine f/u, feeling "unwell" with c/o decreased appetite for the past couple of months, loss of taste for the past couple of months, diarrhea and constipation, feeling down since 2019, generalized body aches since 2019, and weight loss since 2019. \par Pt reports she had a colonoscopy done in 2018. She states she took her COVID-19 test and it was normal. Pt notes she is going to see her Nephrologist soon.\par Denies nocturia and fever.\par \par Current Medications: Metformin (was DC/ed in 2017), Statins 40 mg daily

## 2020-10-09 NOTE — REVIEW OF SYSTEMS
[Recent Weight Gain (___ Lbs)] : recent weight gain: [unfilled] lbs [Negative] : Neurological [Decreased Appetite] : decreased appetite [Constipation] : constipation [Diarrhea] : diarrhea [As Noted in HPI] : as noted in HPI [Fever] : no fever [Nocturia] : no nocturia [FreeTextEntry2] : generalized body aches  [FreeTextEntry4] : c/o loss of taste  [de-identified] : feeling down

## 2020-10-09 NOTE — PHYSICAL EXAM
[Alert] : alert [Normal Sclera/Conjunctiva] : normal sclera/conjunctiva [Normal Outer Ear/Nose] : the ears and nose were normal in appearance [Clear to Auscultation] : lungs were clear to auscultation bilaterally [Normal PMI] : the apical impulse was normal [Normal Bowel Sounds] : normal bowel sounds [Kyphosis] : kyphosis present [No Stigmata of Cushings Syndrome] : no stigmata of Cushings Syndrome [Cranial Nerves Intact] : cranial nerves 2-12 were intact [Oriented x3] : oriented to person, place, and time [de-identified] : no thyroid nodules palpated  [de-identified] : 1+ pitting edema on BLE

## 2020-10-12 LAB
25(OH)D3 SERPL-MCNC: 23.2 NG/ML
ACTH STIM ACTH BASELINE: 5.3 PG/ML
ACTH STIM CORTISOL 30 MIN: 25.6 UG/DL
ACTH STIM CORTISOL BASELINE: 9 UG/DL
ACTH STIMULATION : CORTISOL 60 MIN: 29.3 UG/DL
ALBUMIN SERPL ELPH-MCNC: 4.2 G/DL
ALP BLD-CCNC: 86 U/L
ALT SERPL-CCNC: 12 U/L
ANION GAP SERPL CALC-SCNC: 12 MMOL/L
AST SERPL-CCNC: 21 U/L
BILIRUB SERPL-MCNC: 0.5 MG/DL
BUN SERPL-MCNC: 14 MG/DL
CALCIUM SERPL-MCNC: 10.2 MG/DL
CHLORIDE SERPL-SCNC: 104 MMOL/L
CHOLEST SERPL-MCNC: 234 MG/DL
CHOLEST/HDLC SERPL: 3.5 RATIO
CO2 SERPL-SCNC: 24 MMOL/L
CREAT SERPL-MCNC: 0.95 MG/DL
CREAT SPEC-SCNC: 155 MG/DL
ESTIMATED AVERAGE GLUCOSE: 123 MG/DL
GLUCOSE SERPL-MCNC: 90 MG/DL
HBA1C MFR BLD HPLC: 5.9 %
HDLC SERPL-MCNC: 68 MG/DL
LDLC SERPL CALC-MCNC: 156 MG/DL
MICROALBUMIN 24H UR DL<=1MG/L-MCNC: 3.9 MG/DL
MICROALBUMIN/CREAT 24H UR-RTO: 25 MG/G
POTASSIUM SERPL-SCNC: 4.3 MMOL/L
PROT SERPL-MCNC: 7.1 G/DL
SODIUM SERPL-SCNC: 140 MMOL/L
TRIGL SERPL-MCNC: 52 MG/DL
TSH SERPL-ACNC: 0.16 UIU/ML

## 2020-10-28 ENCOUNTER — APPOINTMENT (OUTPATIENT)
Dept: NEPHROLOGY | Facility: CLINIC | Age: 85
End: 2020-10-28
Payer: MEDICARE

## 2020-10-28 VITALS — SYSTOLIC BLOOD PRESSURE: 150 MMHG | OXYGEN SATURATION: 98 % | DIASTOLIC BLOOD PRESSURE: 66 MMHG | HEART RATE: 60 BPM

## 2020-10-28 PROCEDURE — 99214 OFFICE O/P EST MOD 30 MIN: CPT

## 2020-10-28 RX ORDER — ASCORBIC ACID 500 MG
500 TABLET ORAL
Refills: 0 | Status: ACTIVE | COMMUNITY

## 2020-10-28 RX ORDER — FOLIC ACID 1 MG/1
1 TABLET ORAL
Refills: 0 | Status: ACTIVE | COMMUNITY

## 2020-10-28 NOTE — HISTORY OF PRESENT ILLNESS
[FreeTextEntry1] : 86 yo woman with HTN, CKD, CAD, DMT2, hyperlipidemia, here for f/u evaluation\par still with some LE edema, but less than at last OV\par constipation also improving; lower abd pain same-\par for nuclear cardiac stress test\par prior microalbuminuria and hematuria\par Denies flank pain, dysuria, hematuria or frothy urine \par no SOB, DELUCA\par \par PMH: \par Had colonoscopy 10/19/2018 + diverticular disease\par had urodynamics at [facility]- does not recall results\par  MRI brain possible "ministrokes" or early dementia\par Has 4 cardiac and 4 left leg and 3 right leg stents

## 2020-10-28 NOTE — ASSESSMENT
[FreeTextEntry1] : all lab data was reviewed with patient in detail from 10/6 and 7/2020\par 84 yo woman with CKD 3, HTN, LE edema, DM\par -CKD 3- creat stable- electrolytes within limits\par -HTN-  BP's improving and may be acceptable given her age-\par reminded to measure her BP at home- she will try\par - LE edema- a bit better c/w lasix at 20 mg- reduced from 40 mg daily\par -hematuria-  resolved\par -proteinuria-   controlled- c/w quinapril\par \par \par f/u 4-6 months

## 2020-10-28 NOTE — PHYSICAL EXAM
[General Appearance - Alert] : alert [Sclera] : the sclera and conjunctiva were normal [Outer Ear] : the ears and nose were normal in appearance [Examination Of The Oral Cavity] : the lips and gums were normal [Neck Cervical Mass (___cm)] : no neck mass was observed [] : no respiratory distress [Auscultation Breath Sounds / Voice Sounds] : lungs were clear to auscultation bilaterally [Heart Rate And Rhythm] : heart rate was normal and rhythm regular [Heart Sounds] : normal S1 and S2 [Heart Sounds Gallop] : no gallops [Murmurs] : no murmurs [Heart Sounds Pericardial Friction Rub] : no pericardial rub [Cervical Lymph Nodes Enlarged Posterior Bilaterally] : posterior cervical [Cervical Lymph Nodes Enlarged Anterior Bilaterally] : anterior cervical [Supraclavicular Lymph Nodes Enlarged Bilaterally] : supraclavicular [No CVA Tenderness] : no ~M costovertebral angle tenderness [No Spinal Tenderness] : no spinal tenderness [Involuntary Movements] : no involuntary movements were seen [Skin Color & Pigmentation] : normal skin color and pigmentation [No Focal Deficits] : no focal deficits [Oriented To Time, Place, And Person] : oriented to person, place, and time [Impaired Insight] : insight and judgment were intact [Affect] : the affect was normal [FreeTextEntry1] : tr LE edema

## 2020-10-28 NOTE — REVIEW OF SYSTEMS
[Feeling Tired] : feeling tired [Eyesight Problems] : eyesight problems [As Noted in HPI] : as noted in HPI [Arthralgias] : arthralgias [Joint Pain] : joint pain [Negative] : Psychiatric [FreeTextEntry8] : increased creatinine

## 2020-11-25 ENCOUNTER — APPOINTMENT (OUTPATIENT)
Dept: HEART AND VASCULAR | Facility: CLINIC | Age: 85
End: 2020-11-25

## 2020-12-04 RX ORDER — COSYNTROPIN 0.25 MG/ML
0.25 INJECTION, POWDER, LYOPHILIZED, FOR SOLUTION INTRAVENOUS
Qty: 0 | Refills: 0 | Status: COMPLETED | OUTPATIENT
Start: 2020-10-06

## 2021-01-01 NOTE — ED ADULT NURSE NOTE - NS ED NURSE LEVEL OF CONSCIOUSNESS ORIENTATION
[facility]   Intensive Care Daily Progress Note      Name: Sagrario Cope  (Female-B Prince Cope)        MRN [id number]  Parents:  Prince Cope and Rigoberto Mosquera  YOB: 2020 10:23 AM  Date of Admission: 2021  ____    History of Present Illness   , appropriate for gestational age, Gestational Age: 28w6d, 2 lb 6.1 oz (1080 g)  female infant, Twin B, born due to maternal preeclampsia with renal involvement.     Patient Active Problem List   Diagnosis     Prematurity, birth weight 1,000-1,249 grams, with 28 completed weeks of gestation     Respiratory failure of      Respiratory distress syndrome in      Slow feeding in      Overnight events:  Stable      Assessment & Plan     Overall Status:    55 day old, , female infant, now at 36w5d PMA.     This patient whose weight is < 5000 grams is no longer critically ill, but requires cardiac/respiratory monitoring, vital sign monitoring, temperature maintenance, enteral feeding adjustments, lab and/or oxygen monitoring and constant observation by the health care team under direct physician supervision.     Vascular Access:  Low UVC - Out on   UAC removed     FEN:      Vitals:    21 1730 21 1710 21 1700   Weight: 2.4 kg (5 lb 4.7 oz) 2.44 kg (5 lb 6.1 oz) 2.43 kg (5 lb 5.7 oz)     125%  Weight change: -0.01 kg (-0.4 oz)    ~143 ml and 114 kcal/kg/day  Appropriate I/Os    Hx of hyperglycemia. Last insulin on . Resolved.    weight gain is tracking along 30%ile but length is lagging. OFC growth is improving. See clinical nutrition service consult on 10/8. Growth on 10/25, weight and length are 29th%ile and ofc is 49th%ile.  Immature feeding    Vit D deficiency: Vitamin D level on 10/4= 25 (vit D to 15). Follow up level - after 2 weeks- 45 (10/18). Stopped supplement   -anticiapte starting routine Vit D supplements using Polyvisol with Fe at discharge    Plan:  -  TF goal 160 ml/kg/day.   - On enteral feeds of MBM/sHMF 24 kcals/oz Stopped added LP 11/1.   Will go home on Neosure 24 or BM with Neosure to 24 kcal.  - On q3h NG feeds  - Start IDF 10/22 (mom not planning on protected BF). PO 33%  - OT consulted  - Consult lactation specialist and dietician.  - zinc 8.8mg/kg/d for poor linear growth, now improving, continue x6 weeks or discharge (whichever comes first)  - Monitor fluid status, glucoses, electrolytes        Lab Results   Component Value Date    ALKPHOS 338 (H) 2021    ALKPHOS 344 (H) 2021     No further AP levels required.      Respiratory:  Failure with RDS requiring mechanical ventilation x 1 day. Received surfactant x 1. Extubated to CPAP on 9/11 9/27 Failed trial off CPAP x 45 min    10/4 weaned from  bCPAP 5, FiO2 21%   10/4  HFNC @ 4 L/min of RA -25.  10/6 HFNC @ 3 L/min of RA .  10/7 HFNC @ 2 L/min of RA .  - Weaned to low flow oxygen 10/11.   - Weaned off flow 10/24  - Monitor respiratory status        Apnea of Prematurity:    At risk due to PMA <34 weeks. Occasional SR B/D events with feeds  - Occasional spells needing stim - increased on 10/19  - Stopped caffeine 10/16 - one time load given 10/20 due to increased spells - improved (mostly just after feeds).  Still with occasional spells needing stimulation -last 1031      Cardiovascular:    Initially required NS bolus x2 and dopamine x 3 hrs. Now hemodynamically stable.  - ECHO on 9/17 showed PFO and tiny pericardia effusion with no F/U needed.  - s/p indocin prophylaxis (started 9/11; not started 9/10 since on Dopamine)  - Obtain CCHD screen.   - Routine CR monitoring.  - intermittently tachycardic. Caffeine level on 10/6 =17    ID:    Potential for sepsis in the setting of twin A with PROM unknown length of time.  GBS positive. Received latency antibiotics (ampicillin, amoxicillin, zithromax)  9/10-17 Treated for culture negative sepsis x7 days with amp/gent due to neutropenia, hemodynamic  instability.    - routine IP surveillance tests for MRSA and SARS-CoV-2     Hematology:   >Risk for anemia of prematurity/phlebotomy.      Hemoglobin   Date Value Ref Range Status   2021 14.0 10.5 - 14.0 g/dL Final   2021 13.2 10.5 - 14.0 g/dL Final   2021 12.2 11.1 - 19.6 g/dL Final   2021 12.3 11.1 - 19.6 g/dL Final   2021 11.5 11.1 - 19.6 g/dL Final     Received PRBC on 9/15  - Previously on Darbepoietin (started 9/20). Last dose 10/25  - On Fe (11mg/kg) supplementation - increased 10/18.  Ferritn 50 on 10/18 and 54 on 10/25.  May need higher than usual dose of Fe at the time of discharge.  - Ferritin -11/8      Ferritin   Date Value Ref Range Status   2021 54 ng/mL Final   2021 50 ng/mL Final   2021 72 ng/mL Final   2021 113 ng/mL Final   2021 207 ng/mL Final        >Neutropenia  - resolved    >Platelets have been normal  Platelet Count   Date Value Ref Range Status   2021 300 150 - 450 10e3/uL Final   2021 208 150 - 450 10e3/uL Final   2021 228 150 - 450 10e3/uL Final   2021 224 150 - 450 10e3/uL Final   2021 222 150 - 450 10e3/uL Final         Renal:   At risk for SHANNON due to prematurity, transient hypotension, Cr down trending  - monitor UO closely.  Creatinine   Date Value Ref Range Status   2021 0.58 0.33 - 1.01 mg/dL Final   2021 0.79 0.33 - 1.01 mg/dL Final   2021 0.82 0.33 - 1.01 mg/dL Final   2021 0.87 0.33 - 1.01 mg/dL Final   2021 1.02 (H) 0.33 - 1.01 mg/dL Final       Jaundice:  Resolved  At risk for hyperbilirubinemia due to prematurity. Maternal blood type O+.  Baby O-, PIA neg  Stopped phototherapy 9/13. Resolved issue    CNS:    Exam wnl. At risk for IVH/PVL due to GA <34 weeks.  - Received prophylactic Indocin   - Obtain screening head ultrasounds on DOL 7 normal (9/16).     - Obtain screening head ultrasound at ~36w PMA or PTD. 10/30  - Developmental cares per NICU protocol.  -  Monitor clinical exam and weekly OFC measurements.      Toxicology:   No maternal risk factors for substance abuse. Infant does not meet criteria for toxicology screening.     Sedation/ Pain Control:  - Nonpharmacologic comfort measures. Sweetease with painful procedures.    Ophthalmology:   At risk for ROP due to prematurity (<31 weeks Birth GA) very low birth weight (<1500 gm)    - Schedule ROP exam with Peds Ophthalmology per protocol.   Oct 19: zone 3, stage 1, f/u 3 weeks     Thermoregulation:   - Monitor temperature and provide thermal support as indicated.    HCM and Discharge Planning:  - Screening tests  indicated PTD:  - [place] 24 hr - Borderline AA's  - Repeat NMS at 14 do- negative  - Final repeat NMS at 30 do - normal  - CCHD screen at 24-48 hr and on RA. ECHO  - Hearing screen at/after 35wk GA.  - Carseat trial just PTD   - OT input.  - Continue standard NICU cares and family education plan.      Immunizations   - Parents want hepatitis B at 2 months  - Received Synagis on 10/28 (with her sister (<29 wk GA)   - Referral PTD made on 10/29-  Immunization History   Administered Date(s) Administered     Synagis 2021          Medications   Current Facility-Administered Medications   Medication     Breast Milk label for barcode scanning 1 Bottle     cholecalciferol (D-VI-SOL, Vitamin D3) 10 mcg/mL (400 units/mL) liquid 10 mcg     cyclopentolate-phenylephrine (CYCLOMYDRYL) 0.2-1 % ophthalmic solution 1 drop     ferrous sulfate (SHLOMO-IN-SOL) oral drops 12 mg     glycerin (PEDI-LAX) Suppository 0.125 suppository     sucrose (SWEET-EASE) solution 0.1-2 mL     tetracaine (PONTOCAINE) 0.5 % ophthalmic solution 1 drop     zinc sulfate solution 14 mg        Physical Exam    GENERAL: NAD, female infant. Overall appearance c/w CGA. Well appearing  RESPIRATORY: Chest CTA, no retractions.   CV: RRR, no murmur, strong/sym pulses in UE/LE, good perfusion.   ABDOMEN: full but soft, +BS,  Oriented - self; Oriented - place; Oriented - time no HSM. Small umbilical hernia  CNS: Normal tone for GA. AFOF. MAEE.   Rest of exam unremarkable    Communications   Parents:  Updated  Extended Emergency Contact Information  Primary Emergency Contact: KANDACE ARCOS  Mobile Phone: [phone]  Relation: Parent  Secondary Emergency Contact: PRINCE COPE  Address: [address] [place] [address] [id number] [place] [address]  Home Phone: [phone]  Mobile Phone: [phone]  Relation: Mother    PCPs:   Infant PCP: Yeimi Carballo    Maternal OB PCP:  Brenda Meade MD. Updated via Compliance Control 10/1  MFM: Miley Beltre MD. Updated via Epic 10/1  Delivering Provider:  Jae Juárez MD. Updated via Compliance Control 10/1      Health Care Team:  Patient discussed with the care team. A/P, imaging studies, laboratory data, medications and family situation reviewed.    Female-B Prince Cope was seen and evaluated by me, Weston Lewis MD

## 2021-02-09 ENCOUNTER — APPOINTMENT (OUTPATIENT)
Dept: ENDOCRINOLOGY | Facility: CLINIC | Age: 86
End: 2021-02-09
Payer: MEDICARE

## 2021-02-09 VITALS
RESPIRATION RATE: 16 BRPM | SYSTOLIC BLOOD PRESSURE: 135 MMHG | WEIGHT: 114 LBS | HEIGHT: 59 IN | HEART RATE: 80 BPM | BODY MASS INDEX: 22.98 KG/M2 | OXYGEN SATURATION: 97 % | DIASTOLIC BLOOD PRESSURE: 74 MMHG

## 2021-02-09 LAB — GLUCOSE BLDC GLUCOMTR-MCNC: 117

## 2021-02-09 PROCEDURE — 99213 OFFICE O/P EST LOW 20 MIN: CPT | Mod: 25

## 2021-02-09 PROCEDURE — 82962 GLUCOSE BLOOD TEST: CPT

## 2021-02-12 LAB
ALBUMIN SERPL ELPH-MCNC: 4.3 G/DL
ALP BLD-CCNC: 87 U/L
ALT SERPL-CCNC: 13 U/L
ANION GAP SERPL CALC-SCNC: 12 MMOL/L
AST SERPL-CCNC: 23 U/L
BILIRUB SERPL-MCNC: 0.5 MG/DL
BUN SERPL-MCNC: 19 MG/DL
CALCIUM SERPL-MCNC: 10.4 MG/DL
CHLORIDE SERPL-SCNC: 103 MMOL/L
CO2 SERPL-SCNC: 24 MMOL/L
CREAT SERPL-MCNC: 1.1 MG/DL
ESTIMATED AVERAGE GLUCOSE: 128 MG/DL
GLUCOSE SERPL-MCNC: 97 MG/DL
HBA1C MFR BLD HPLC: 6.1 %
POTASSIUM SERPL-SCNC: 4 MMOL/L
PROT SERPL-MCNC: 7.3 G/DL
SODIUM SERPL-SCNC: 139 MMOL/L
T4 FREE SERPL-MCNC: 1.4 NG/DL
TSH SERPL-ACNC: 0.2 UIU/ML

## 2021-02-12 NOTE — ASSESSMENT
[Carbohydrate Consistent Diet] : carbohydrate consistent diet [FreeTextEntry1] : 84 y/o F pt with:\par \par 1. T2DM (dx in 2016), diet controlled since 2017 (recent A1c 5.9% in 10/2020):\par Pt recently gained weight, however, she is having trouble eating food 2/2 her implants coming out which makes it difficult for her to chew properly. Pt states most the time she is unable to eat the food she likes. She does not have symptoms of osmotic diuresis. For the past couple of yrs her A1c has not been higher than 6.6%.\par Recommend pt take to her family members to accommodate for her meals needs. Pt can be f/u with PCP. Return PRN.\par \par 2. Low TSH 0.16 in 10/2020: Variable levels: low normal and mild decrease levels\par Pt appears to be clinically euthyroid, with her thyroid neck exam unremarkable. \par Will send for TFTs and will call pt with report. \par \par 3. HLD:\par Noticed high levels of cholesterol which she is on Lipitor. \par Recommend decrease saturates/trans fat consumption\par \par Return in 6 months.  [Hypoglycemia Management] : hypoglycemia management [Importance of Diet and Exercise] : importance of diet and exercise to improve glycemic control, achieve weight loss and improve cardiovascular health

## 2021-02-12 NOTE — REVIEW OF SYSTEMS
[Decreased Appetite] : decreased appetite [Recent Weight Gain (___ Lbs)] : recent weight gain: [unfilled] lbs [As Noted in HPI] : as noted in HPI [Negative] : Heme/Lymph [FreeTextEntry4] : denies swallowing/breathing difficulties, denies voice changes

## 2021-02-12 NOTE — HISTORY OF PRESENT ILLNESS
[FreeTextEntry1] : 4/24/17 FNA Biopsy: Preliminary ultrasound images confirm the presence of 1.0 cm nodule in the upper pole of the left lobe of the thyroid; Benign Findings: [place] Category II (pt also had an FNA biopsy on 1/28/16)\par 8/22/17 A1c 6%, ldl-c 152, s.creat 1.09\par 10/2017 A1c 6%\par 12/10/18 POCT A1c 5.8%\par 12/18/18 iPTH 79, Ca 10.1, cholesterol 257, LDL-c 167, s. creat 1.08, Vit D 25-OH 22, \par 2/8/19 Vit B12 753, s. creat 1.15, cholesterol 212, LDL-c 138, Vit D 25-OH 25.1, \par 8/12/20 microalb/creat ratio 38, \par 10/6/20 ACTH Stim ACTH Baseline 5.3, urine no protein,   \par 10/7/20 ACTH Stim Cortisol Baseline 9.0, ACTH Stim Cortisol 30 min 25.6, ACTH Stim Cortisol 60 min 29.3, A1c 5.9%, TSH 0.16, Free T4 1.4, Free T3 3.39, s. creat 0.95, Ca 10.2, Vit D 25- OH 23.2, LDL-c 156, Cholesterol 234, \par \par 86 y/o F pt, /74, BMI 23.03, with Hx of T2DM (dx in 2016) and DM related complications of: peripheral neuropathy, CAD- cardiac stent (in 2017), CKD. \par Significant PMHx: thyroid nodules with negative FNA biopsy in 2017.\par Other PMHx: hypercholesterolemia, HTN, \par Follows with nephrologist, rheumatologist, cardiologist.\par Referred to see psychiatrist for depression and forgetfulness\par Last cardiologist visit: in 9/2019, as per pt\par Pt participated in [facility], and was discharged from it to f/u with her PCP/Geriatric Dr. Reyes. \par  \par 10/8/19\par Pt's last endocrine f/u was in 12/2018 with a POCT A1c of 5.8% and was recommended to regulate her lifestyle and diet. She states she had her 3rd heart attack in 6/2019 during her appointment here; a paramedic was called and pt was taken to the hospital. Pt is being closely followed by cardiologist and geriatric team.\par \par Today pt presents for endocrine f/u, feeling "not right" with c/o "bowels giving her trouble recently." Pt states she had a colonoscopy done recently and did a blood test with her Cardiologist in 9/2019 (pt declined blood test today). \par Pt states she is not taking any medications for DM currently; she notes she used to be on Metformin in the past but DC/ed it 2/2 good BS.\par Denies polyuria and CP.\par \par 10/6/20\par Pt's last DM f/u was in 10/2019. Her DM is diet controlled. \par \par Today pt presents for endocrine f/u, feeling "unwell" with c/o decreased appetite for the past couple of months, loss of taste for the past couple of months, diarrhea and constipation, feeling down since 2019, generalized body aches since 2019, and weight loss since 2019. \par Pt reports she had a colonoscopy done in 2018. She states she took her COVID-19 test and it was normal. Pt notes she is going to see her Nephrologist soon.\par Denies nocturia and fever.\par \par 2/9/21\par Today pt presents for endocrine f/u with POCT 117, feeling well with c/o worsening poor appetite.\par Pt states she has teeth however her implants came out and she has not been chewing well. \par Pt gained 3lbs since 10/2020 which she relates to drinking more soda. She notes she can go all day without eating, instead she drinks more. \par Denies swallowing/breathing difficulties, and voice changes. \par \par Current Medications: Metformin (was DC/ed in 2017), Statins 40 mg daily

## 2021-02-12 NOTE — ADDENDUM
[FreeTextEntry1] : I, Merritt Jose, acted solely as a scribe for Dr. Denton Mckenzie on this date. 02/09/2021.

## 2021-02-12 NOTE — PHYSICAL EXAM
[Alert] : alert [Normal Sclera/Conjunctiva] : normal sclera/conjunctiva [Clear to Auscultation] : lungs were clear to auscultation bilaterally [Normal Outer Ear/Nose] : the ears and nose were normal in appearance [Normal PMI] : the apical impulse was normal [Normal Bowel Sounds] : normal bowel sounds [Kyphosis] : kyphosis present [No Stigmata of Cushings Syndrome] : no stigmata of Cushings Syndrome [Cranial Nerves Intact] : cranial nerves 2-12 were intact [Oriented x3] : oriented to person, place, and time [de-identified] : no thyroid nodules palpated  [de-identified] : 1+ pitting edema on BLE

## 2021-02-12 NOTE — END OF VISIT
[FreeTextEntry3] : All medical record entries made by the Scribe were at my, Dr. Denton Mckenzie, direction and personally dictated by me on 02/09/2021. I have reviewed the chart and agree that the record accurately reflects my personal performance of the history, physical exam, assessment and plan. I have also personally directed, reviewed and agreed with the chart.  [Time Spent: ___ minutes] : I have spent [unfilled] minutes of time on the encounter.

## 2021-02-24 ENCOUNTER — APPOINTMENT (OUTPATIENT)
Dept: NEPHROLOGY | Facility: CLINIC | Age: 86
End: 2021-02-24
Payer: MEDICARE

## 2021-02-24 VITALS — HEART RATE: 66 BPM | DIASTOLIC BLOOD PRESSURE: 62 MMHG | SYSTOLIC BLOOD PRESSURE: 144 MMHG

## 2021-02-24 PROCEDURE — 99213 OFFICE O/P EST LOW 20 MIN: CPT

## 2021-02-24 RX ORDER — PANTOPRAZOLE 20 MG/1
20 TABLET, DELAYED RELEASE ORAL
Refills: 0 | Status: DISCONTINUED | COMMUNITY
End: 2021-02-24

## 2021-02-24 NOTE — HISTORY OF PRESENT ILLNESS
[FreeTextEntry1] : 84 yo woman here for f/u evaluation of HTN, CKD, CAD, DMT2, hyperlipidemia.\par prior microalbuminuria and hematuria\par reports today that she has been feeling confused at times-  not confused now-\par getting annoyed when interacting with some other doctors\par getting HAs- no change to vision\par LE edema about same, but now says legs are achy\par Denies flank pain, dysuria, hematuria or frothy urine \par no SOB, DELUCA\par \par PMH: \par Had colonoscopy 10/19/2018 + diverticular disease\par had urodynamics at [facility]- does not recall results\par  MRI brain possible "ministrokes" or early dementia\par Has 4 cardiac and 4 left leg and 3 right leg stents

## 2021-02-24 NOTE — ASSESSMENT
[FreeTextEntry1] : all lab data was reviewed with patient in detail from 2/24/2021\par 86 yo woman with CKD 3, HTN, LE edema, DM\par -CKD 3- creat 1.1- good- no return to prior peak of 1.44; electrolytes within limits\par -HTN-  BP acceptable here today- no changes  today\par -HA- encouraged neurologic evaluation\par -?confusion- appropriate now- telling me stories and engaging - suggested she undergo neuro evaluation for this as well\par - LE edema-  trace -1+- try less salt- - agree with increase in furosemide 40 mg daily\par -hematuria-  needs repeat u/a\par -proteinuria-   repeat Uprot/creat c/w valsartan\par \par \par f/u 4-6 months

## 2021-02-24 NOTE — PHYSICAL EXAM
[General Appearance - Alert] : alert [] : no respiratory distress [Auscultation Breath Sounds / Voice Sounds] : lungs were clear to auscultation bilaterally [Heart Rate And Rhythm] : heart rate was normal and rhythm regular [Heart Sounds] : normal S1 and S2 [Heart Sounds Gallop] : no gallops [Murmurs] : no murmurs [Heart Sounds Pericardial Friction Rub] : no pericardial rub [Cervical Lymph Nodes Enlarged Posterior Bilaterally] : posterior cervical [Cervical Lymph Nodes Enlarged Anterior Bilaterally] : anterior cervical [Supraclavicular Lymph Nodes Enlarged Bilaterally] : supraclavicular [Oriented To Time, Place, And Person] : oriented to person, place, and time [Impaired Insight] : insight and judgment were intact [Affect] : the affect was normal [FreeTextEntry1] : tr LE edema

## 2021-02-28 LAB
APPEARANCE: CLEAR
BACTERIA: NEGATIVE
BILIRUBIN URINE: NEGATIVE
BLOOD URINE: NEGATIVE
COLOR: NORMAL
CREAT SPEC-SCNC: 142 MG/DL
CREAT SPEC-SCNC: 142 MG/DL
CREAT/PROT UR: 0.2 RATIO
GLUCOSE QUALITATIVE U: NEGATIVE
HYALINE CASTS: 0 /LPF
KETONES URINE: NEGATIVE
LEUKOCYTE ESTERASE URINE: ABNORMAL
MICROALBUMIN 24H UR DL<=1MG/L-MCNC: 4.1 MG/DL
MICROALBUMIN/CREAT 24H UR-RTO: 29 MG/G
MICROSCOPIC-UA: NORMAL
NITRITE URINE: NEGATIVE
PH URINE: 6
PROT UR-MCNC: 26 MG/DL
PROTEIN URINE: ABNORMAL
RED BLOOD CELLS URINE: 1 /HPF
SARS-COV-2 N GENE NPH QL NAA+PROBE: NOT DETECTED
SPECIFIC GRAVITY URINE: >=1.03
SQUAMOUS EPITHELIAL CELLS: 2 /HPF
UROBILINOGEN URINE: NORMAL
WHITE BLOOD CELLS URINE: 6 /HPF

## 2021-03-22 NOTE — ED ADULT NURSE NOTE - CAS TRG GEN SKIN COLOR
ANTICOAGULATION FOLLOW-UP CLINIC VISIT    Patient Name:  Layne Guadarrama  Date:  3/22/2021  Contact Type:  Telephone    SUBJECTIVE:  Patient Findings     Comments:  Left message for Layne. Patient goal range is 1.8-2.3.  Discussed with Pharmacist Adelina Johnston [facility] for patient's new Anticoagulation recommendation.  Historically, patient took 1mg for supratherapeutic INR as a one time dose adjustment.  Implemented at this encounter.  Will test in one week.        Clinical Outcomes     Comments:  Left message for Layne. Patient goal range is 1.8-2.3.  Discussed with Pharmacist Adelina Johnston [facility] for patient's new Anticoagulation recommendation.  Historically, patient took 1mg for supratherapeutic INR as a one time dose adjustment.  Implemented at this encounter.  Will test in one week.           OBJECTIVE    Recent labs: (last 7 days)     21  1442   INR 2.50*       ASSESSMENT / PLAN  INR assessment SUPRA    Recheck INR In: 1 WEEK    INR Location Clinic      Anticoagulation Summary  As of 3/22/2021    INR goal:     TTR:  43.7 % (10.8 mo)   Prior goal:  2.0-2.5   INR used for dosin.50 (3/22/2021)   Warfarin maintenance plan:  1.5 mg (1 mg x 1.5) every Mon, Wed, Fri; 2 mg (1 mg x 2) all other days   Full warfarin instructions:  3/22: 1 mg; Otherwise 1.5 mg every Mon, Wed, Fri; 2 mg all other days   Weekly warfarin total:  12.5 mg   Plan last modified:  Anali Tate RN (3/1/2021)   Next INR check:  3/29/2021   Priority:  Critical   Target end date:  Indefinite    Indications    Long-term (current) use of anticoagulants [Z79.01] [Z79.01]  Pulmonary embolism with infarction (HCC) [I26.99] (Resolved) [I26.99]  LVAD (left ventricular assist device) present (H) [Z95.811]  Chronic systolic congestive heart failure (H) [I50.22]             Anticoagulation Episode Summary     INR check location:      Preferred lab:      Send INR reminders to:  RANDI BROWN [facility]    Comments:  HIPPA Form Mailed 17    No  Bridging Age>75  HM3 LVAD Implanted 11/22/17   (8/14/20++New goal range 1.8-2.3++  ASA is on Hold)  Patient has 1mg and 3mg tablets.        Anticoagulation Care Providers     Provider Role Specialty Phone number    Rita Muhammad MD Referring Advanced Heart Failure and Transplant Cardiology [phone]            See the Encounter Report to view Anticoagulation Flowsheet and Dosing Calendar (Go to Encounters tab in chart review, and find the Anticoagulation Therapy Visit)    INR/CFX/F2 RESULT:INR result is 2.5    ASSESSMENT:Left message for patient with results and dosing recommendations. Asked patient to call back to report any missed doses, falls, signs and symptoms of bleeding or clotting, any changes in health, medication, or diet. Asked patient to call back with any questions or concerns.    DOSING ADJUSTMENT:1mg today, then resume dosing pattern    NEXT INR/FACTOR X OR FACTOR II:3/29    PROTOCOL FOLLOWED:LVAD 11/29/17, HM3, ASA on hold    Danis Valle RN                  Normal for race

## 2021-06-23 ENCOUNTER — APPOINTMENT (OUTPATIENT)
Dept: NEPHROLOGY | Facility: CLINIC | Age: 86
End: 2021-06-23
Payer: MEDICARE

## 2021-06-23 VITALS — DIASTOLIC BLOOD PRESSURE: 64 MMHG | SYSTOLIC BLOOD PRESSURE: 142 MMHG | HEART RATE: 66 BPM

## 2021-06-23 PROCEDURE — 99214 OFFICE O/P EST MOD 30 MIN: CPT

## 2021-06-23 NOTE — HISTORY OF PRESENT ILLNESS
[FreeTextEntry1] : 84 yo woman with HTN, CKD, CAD, DMT2, hyperlipidemia, here for follow up evaluation\par prior microalbuminuria and hematuria\par for LE edema- reports that at [facility], her team changed to lasix 20 mg daily- and takes 40 mg when legs with more edema\par was to have change in psych meds, but declined changes\par children "are good to  me"\par offers that she is still, at times, getting annoyed when interacting with some other doctors\par HAs better- was having very elevated eye pressures- > 50- now treating and vision stable HA resolved.\par Denies flank pain, dysuria, hematuria or frothy urine \par no SOB, DELUCA\par \par PMH: \par Had colonoscopy 10/19/2018 + diverticular disease\par had urodynamics at [facility]- does not recall results\par  MRI brain possible "ministrokes" or early dementia\par Has 4 cardiac and 4 left leg and 3 right leg stents

## 2021-06-23 NOTE — ASSESSMENT
[FreeTextEntry1] : rediscussed her labs from EMR- trends-  wrote down peak levels Creat 1.44 Umicroalb/cret 68\par 86 yo woman with CKD 3, HTN, LE edema, DM\par -CKD 3- creat 1- good- no return to prior peak of 1.44; electrolytes within limits\par -HTN-  BP acceptable - c/w present regimen\par -HA- resolved after glaucoma improved\par - LE edema-  trace -1+- try less salt- - agree with increase in furosemide 40 mg daily- prefer 40 mg daily- but okay to modify to her preferences for now- 20 mg daily- 40 mg prn.\par -hematuria-   resolved\par -proteinuria- controlled. c/w valsartan\par - requesting COVID nasal swab screening today- ordered\par f/u 6-9 months

## 2021-07-14 ENCOUNTER — APPOINTMENT (OUTPATIENT)
Dept: ENDOCRINOLOGY | Facility: CLINIC | Age: 86
End: 2021-07-14

## 2021-07-15 ENCOUNTER — APPOINTMENT (OUTPATIENT)
Dept: ENDOCRINOLOGY | Facility: CLINIC | Age: 86
End: 2021-07-15
Payer: MEDICARE

## 2021-07-15 VITALS
DIASTOLIC BLOOD PRESSURE: 71 MMHG | HEART RATE: 58 BPM | WEIGHT: 111 LBS | BODY MASS INDEX: 22.42 KG/M2 | SYSTOLIC BLOOD PRESSURE: 138 MMHG

## 2021-07-15 PROCEDURE — 99214 OFFICE O/P EST MOD 30 MIN: CPT | Mod: 25

## 2021-07-15 PROCEDURE — 82962 GLUCOSE BLOOD TEST: CPT

## 2021-07-15 NOTE — REVIEW OF SYSTEMS
[Recent Weight Loss (___ Lbs)] : recent weight loss: [unfilled] lbs [As Noted in HPI] : as noted in HPI [Negative] : Heme/Lymph

## 2021-07-16 NOTE — HISTORY OF PRESENT ILLNESS
[FreeTextEntry1] : 86 y/o F pt, with Hx of T2DM (dx in 2016) and DM related complications of peripheral neuropathy, CKD and CAD- cardiac stent in 2017, and Hx of b/l thyroid nodules with negative FNA biopsy in 2017. \par Other PMHx: Osteoporosis, Hypercholesterolemia, HTN\par SHx: Lives alone. Has an aid who works 4 hrs daily. \par Last cardiologist visit: 9/2019\par Pt participated in the [facility], and was discharged from it to f/u with her PCP/Geriatric Dr. Reyes. \par  \par 07/15/2021\par Pt presents today with POCT 96, /71 and BMI 22.42 for DM f/u. She is feeling not well with c/o trouble walking. Her nephrologist Dr. Soler has advised to start using walker. Pt does not remember if she has ever been on osteoporosis treatment; she states she may have been on arthritis medication before. \par Pt follows regularly with Dr. Gimenez at [facility]; her last visit was 2 weeks ago. \par She has lost 3 lbs in last 5 months. \par Pt's daughter Jaimee was contacted who stated that pt is on Plavix and statin but she is not sure if pt is taking statin regularly. \par \par Current Medications: Lipitor 40 mg qd, Furosemide, Plavix, Vitamin D\par Metformin (held in 2017) \par \par Labs:\par - 02/10/21: A1c 6.1%, s.creat 1.10, TSH 0.20, Free T4 1.4\par - 10/07/20: A1c 5.9%, s.creat 0.95, Cholesterol 234, LDL-c 156, TSH 0.16, Free T4 1.4, Free T3 3.39, Ca 10.2, Vit D 25- OH 23.2, ACTH Stim Cortisol Baseline 9.0, ACTH Stim Cortisol 30 min 25.6, ACTH Stim Cortisol 60 min 29.3\par - 10/06/20: urine no protein, ACTH Stim ACTH Baseline 5.3\par - 02/08/19: s.creat 1.15, Cholesterol 212, LDL-c 138, Vit D 25-OH 25.1, Vit B12 753\par - 12/18/18: s.creat 1.08, Cholesterol 257, LDL-c 167,  iPTH 79, Ca 10.1, Vit D 25-OH 22\par - 12/10/18: POCT A1c 5.8%\par - 08/22/17: A1c 6%, s.creat 1.09, LDL-c 152\par - 04/24/17 FNA L upper pole 1.0 cm nodule: [place] II (pt also had an FNA biopsy on 1/28/16)\par - 03/02/17 Thyroid US: R lobe contains 3 nodules. Nodule 1 - midportion 1.1 x 0.8 x 0.8 cm spongiform. Nodule 2 - midportion 0.6 x 0.4 x 0.7 cm spongiform. Nodule 3 - lower pole 0.5 x 0.3 x 0.4 cm spongiform. L lobe contains 2 nodules. Nodule 1 - superior to midportion 1.0 x 0.6 x 1.0 cm hypoechoic solid with cystic components (FNA recommended). Nodule 2 - mid to lower portion 2.1 x 1.2 x 1.4 cm spongiform. Isthmus contains one midportion 0.5 x 0.3 x 0.6 cm spongiform nodule. \par - 06/24/16 BMD: Left Hip (Total) T-score -1.2. Femoral neck T-score -2.2. Spine T-score -1.6. Total Radius T-score -2.6. Radius segment 1/3 from the wrist to the elbow T-score -2.2. Impression: There is diminished bone density in the left hip, lumbar spine and in the segment one third of the way from the wrist to the elbow. Overall bone density in the L radius is in the osteoporotic range.

## 2021-07-16 NOTE — ADDENDUM
[FreeTextEntry1] : I, Lester Harding, acted solely as a scribe for Dr. Denton Mckenzie on this date. 07/15/2021.

## 2021-07-16 NOTE — ASSESSMENT
[FreeTextEntry1] : 84 y/o F pt with:\par \par 1. T2DM (dx in 2016), diet controlled since 2017:\par Her appetite began to decline couple of years ago. Her weight is stable over the past 12 months. She is forgetful. She has health aid who helps for few hours a day. \par Sent A1c and lipid profile today. \par \par 2. Mild decrease in TSH level:\par Pt is clinically euthyroid. Sending TFTs today. \par Thyroid US done in 3/2017 revealed b/l thyroid nodules. FNA biopsy of L upper pole 1.0 cm nodule on 4/24/17 was [place] II. \par Thyroid Exam: No neck mass palpated.\par \par 3. Hx of CAD- cardiac stent in 2017:\par She sees cardiologist. She is on Plavix. She was rx statin but we are not sure if she is taking it. Her daughter Jaimee will contact us again next week with medication list. \par Follow up with PCP and cardiologist. \par \par Return in 6 months.  [Carbohydrate Consistent Diet] : carbohydrate consistent diet

## 2021-07-16 NOTE — PHYSICAL EXAM
[Alert] : alert [Normal Sclera/Conjunctiva] : normal sclera/conjunctiva [Normal Outer Ear/Nose] : the ears and nose were normal in appearance [Clear to Auscultation] : lungs were clear to auscultation bilaterally [Normal PMI] : the apical impulse was normal [Normal Bowel Sounds] : normal bowel sounds [Kyphosis] : kyphosis present [No Stigmata of Cushings Syndrome] : no stigmata of Cushings Syndrome [Cranial Nerves Intact] : cranial nerves 2-12 were intact [Oriented x3] : oriented to person, place, and time [de-identified] : no thyroid nodules palpated  [de-identified] : 1+ pitting edema on BLE  [de-identified] : uses cane to ambulate

## 2021-07-16 NOTE — END OF VISIT
[FreeTextEntry3] : All medical record entries made by the Scribe were at my, Dr. Denton Mckenzie, direction and personally dictated by me on 07/15/2021. I have reviewed the chart and agree that the record accurately reflects my personal performance of the history, physical exam, assessment and plan. I have also personally directed, reviewed and agreed with the chart.  [Time Spent: ___ minutes] : I have spent [unfilled] minutes of time on the encounter.

## 2021-07-16 NOTE — RESULTS/DATA
[Hologic] : hologic [T-Score ___] : T-score: [unfilled] [FreeTextEntry2] : 06/24/2016 [FreeTextEntry1] : Total Radius T-score: -2.6\par \par IMPRESSION: \par There is diminished bone density in the left hip and lumbar spine. \par Overall bone density in the left radius is in the osteoporotic range. There is diminished density in the segment one third of the way from the wrist to the elbow.

## 2021-12-15 ENCOUNTER — NON-APPOINTMENT (OUTPATIENT)
Age: 86
End: 2021-12-15

## 2021-12-15 ENCOUNTER — APPOINTMENT (OUTPATIENT)
Dept: ENDOCRINOLOGY | Facility: CLINIC | Age: 86
End: 2021-12-15
Payer: MEDICARE

## 2021-12-15 ENCOUNTER — APPOINTMENT (OUTPATIENT)
Dept: HEART AND VASCULAR | Facility: CLINIC | Age: 86
End: 2021-12-15
Payer: MEDICARE

## 2021-12-15 VITALS
BODY MASS INDEX: 22.58 KG/M2 | SYSTOLIC BLOOD PRESSURE: 145 MMHG | WEIGHT: 112 LBS | HEART RATE: 76 BPM | HEIGHT: 59 IN | DIASTOLIC BLOOD PRESSURE: 75 MMHG

## 2021-12-15 VITALS
WEIGHT: 112 LBS | HEART RATE: 71 BPM | TEMPERATURE: 97.8 F | HEIGHT: 59 IN | OXYGEN SATURATION: 96 % | BODY MASS INDEX: 22.58 KG/M2 | DIASTOLIC BLOOD PRESSURE: 88 MMHG | SYSTOLIC BLOOD PRESSURE: 178 MMHG

## 2021-12-15 PROCEDURE — 93000 ELECTROCARDIOGRAM COMPLETE: CPT

## 2021-12-15 PROCEDURE — 99213 OFFICE O/P EST LOW 20 MIN: CPT | Mod: 25

## 2021-12-15 PROCEDURE — 99214 OFFICE O/P EST MOD 30 MIN: CPT | Mod: 25

## 2021-12-15 PROCEDURE — 82962 GLUCOSE BLOOD TEST: CPT

## 2021-12-15 NOTE — HISTORY OF PRESENT ILLNESS
[FreeTextEntry1] : 85 y/o woman with hx of CABG, multiple PCIs, PVD, DM, CKD, non-complaint on meds comes for follow up.  No associated sob, dizziness. c/o DELUCA.

## 2021-12-15 NOTE — DISCUSSION/SUMMARY
[FreeTextEntry1] : EKG: Normal Sinus  Rhythm 60/min, left atrial enlargement. Anteroseptal  (old).\par ECHO: LV EF 25-30%, global hypokinesis, moderate MR, TR, mild AR, calcified AV, no stenosis\par \par 1. CAD - stable at the moment. Non-compliance on meds d/w pt.\par 2. CKD - f/u with nephrology\par 3. DM - f/u with endo.\par 4. Meds on next visit\par 5. DELUCA - plan for ECHO on next visit.\par \par

## 2021-12-15 NOTE — PHYSICAL EXAM
[Well Developed] : well developed [Well Nourished] : well nourished [Normal Conjunctiva] : normal conjunctiva [Normal Venous Pressure] : normal venous pressure [No Carotid Bruit] : no carotid bruit [Normal S1, S2] : normal S1, S2 [No Murmur] : no murmur [Clear Lung Fields] : clear lung fields [Good Air Entry] : good air entry [Soft] : abdomen soft [Non Tender] : non-tender [Normal Gait] : normal gait [No Edema] : no edema [No Rash] : no rash [Moves all extremities] : moves all extremities [Alert and Oriented] : alert and oriented

## 2021-12-15 NOTE — REVIEW OF SYSTEMS
[Fever] : no fever [Headache] : no headache [Blurry Vision] : no blurred vision [Seeing Double (Diplopia)] : no diplopia [Earache] : no earache [Discharge From Ears] : no discharge from the ears [SOB] : no shortness of breath [Dyspnea on exertion] : dyspnea during exertion [Chest Discomfort] : no chest discomfort [Leg Claudication] : no intermittent leg claudication [Palpitations] : no palpitations [Syncope] : no syncope [Cough] : no cough [Wheezing] : no wheezing [Abdominal Pain] : no abdominal pain [Nausea] : no nausea [Change in Appetite] : no change in appetite [Dysuria] : no dysuria [Joint Pain] : no joint pain [Rash] : no rash [Dizziness] : no dizziness [Confusion] : no confusion was observed

## 2021-12-17 NOTE — HISTORY OF PRESENT ILLNESS
[FreeTextEntry1] : 85 y/o F pt, with Hx of T2DM (dx in 2016) and DM related complications of peripheral neuropathy, CKD and CAD- cardiac stent in 2017, and Hx of b/l thyroid nodules with negative FNA biopsy in 2017. \par Other PMHx: Osteoporosis, Hypercholesterolemia, HTN\par SHx: Lives alone. Has an aid who works 4 hrs daily. \par Does not check her BS. \par Last cardiologist visit: 9/2019\par Pt participated in the [facility], and was discharged from it to f/u with her PCP/Geriatric Dr. Reyes. \par  \par 07/15/2021\par Pt presents today with POCT 96, /71 and BMI 22.42 for DM f/u. She is feeling not well with c/o trouble walking. Her nephrologist Dr. Soler has advised to start using walker. Pt does not remember if she has ever been on osteoporosis treatment; she states she may have been on arthritis medication before. \par Pt follows regularly with Dr. Gimenez at [facility]; her last visit was 2 weeks ago. \par She has lost 3 lbs in last 5 months. \par Pt's daughter Jaimee was contacted who stated that pt is on Plavix and statin but she is not sure if pt is taking statin regularly. \par \par 12/15/2021\par Pt presents today for DM f/u with POCT 89, /75 and BMI 22.62.  She gained 1 lb in 5 months. \par Pt is feeling forgetful, with c/o fatigue. She claims she does not eat well despite having a nurse aid coming to assist her for a couple hours. Pt does not check her BS. \par \par Current Medications: Lipitor 40 mg qd, Furosemide, Plavix, Vitamin D\par \par Labs:\par - 02/26/21: ACR ratio 29, Urine protein 30 mg (A)\par - 02/10/21: A1c 6.1%, s.creat 1.10, TSH 0.20, Free T4 1.4\par - 10/07/20: A1c 5.9%, s.creat 0.95, Cholesterol 234, LDL-c 156, TSH 0.16, Free T4 1.4, Free T3 3.39, Ca 10.2, Vit D 25- OH 23.2, ACTH Stim Cortisol Baseline 9.0, ACTH Stim Cortisol 30 min 25.6, ACTH Stim Cortisol 60 min 29.3\par - 10/06/20: urine no protein, ACTH Stim ACTH Baseline 5.3\par - 02/08/19: s.creat 1.15, Cholesterol 212, LDL-c 138, Vit D 25-OH 25.1, Vit B12 753\par - 12/18/18: s.creat 1.08, Cholesterol 257, LDL-c 167,  iPTH 79, Ca 10.1, Vit D 25-OH 22\par - 12/10/18: POCT A1c 5.8%\par - 08/22/17: A1c 6%, s.creat 1.09, LDL-c 152\par - 04/24/17 FNA L upper pole 1.0 cm nodule: [place] II (pt also had an FNA biopsy on 1/28/16)\par - 03/02/17 Thyroid US: R lobe contains 3 nodules. Nodule 1 - midportion 1.1 x 0.8 x 0.8 cm spongiform. Nodule 2 - midportion 0.6 x 0.4 x 0.7 cm spongiform. Nodule 3 - lower pole 0.5 x 0.3 x 0.4 cm spongiform. L lobe contains 2 nodules. Nodule 1 - superior to midportion 1.0 x 0.6 x 1.0 cm hypoechoic solid with cystic components (FNA recommended). Nodule 2 - mid to lower portion 2.1 x 1.2 x 1.4 cm spongiform. Isthmus contains one midportion 0.5 x 0.3 x 0.6 cm spongiform nodule. \par - 06/24/16 BMD: Left Hip (Total) T-score -1.2. Femoral neck T-score -2.2. Spine T-score -1.6. Total Radius T-score -2.6. Radius segment 1/3 from the wrist to the elbow T-score -2.2. Impression: There is diminished bone density in the left hip, lumbar spine and in the segment one third of the way from the wrist to the elbow. Overall bone density in the L radius is in the osteoporotic range.

## 2021-12-17 NOTE — ADDENDUM
[FreeTextEntry1] : I Jessicagabriel Martinez act soley as a scribe for Dr. Denton Mckenzie on this date. 12/15/2021

## 2021-12-17 NOTE — END OF VISIT
[FreeTextEntry3] : All medical record entries made by the Scribe were at my, Dr. Denton Mckenzie, direction and personally dictated by me on 12/15/2021. I have reviewed the chart and agree that the record accurately reflects my personal performance of the history, physical exam, assessment and plan. I have also personally directed, reviewed and agreed with the chart.  [Time Spent: ___ minutes] : I have spent [unfilled] minutes of time on the encounter.

## 2021-12-17 NOTE — REVIEW OF SYSTEMS
[Fatigue] : fatigue [Decreased Appetite] : decreased appetite [As Noted in HPI] : as noted in HPI [Negative] : Musculoskeletal [de-identified] : Forgetfulness

## 2021-12-17 NOTE — ASSESSMENT
[FreeTextEntry1] : 85 y/o F pt with:\par \par 1. Hx of T2DM (dx in 2016), diet controlled since 2017:\par Her appetite has decreased and her weight remains the same: 111 lbs in 10/2020. \par Send metabolic labs including A1c today. \par \par 2. Slight decrease in TSH level:\par Pt continues to be clinically euthyroid. \par Sending TFTs and TSH receptor Ab today. \par \par F/U with her internist.\par Return in 1 year\par \par

## 2021-12-17 NOTE — PHYSICAL EXAM
[Alert] : alert [Normal Sclera/Conjunctiva] : normal sclera/conjunctiva [Normal Outer Ear/Nose] : the ears and nose were normal in appearance [Clear to Auscultation] : lungs were clear to auscultation bilaterally [Normal Bowel Sounds] : normal bowel sounds [Kyphosis] : kyphosis present [No Stigmata of Cushings Syndrome] : no stigmata of Cushings Syndrome [Cranial Nerves Intact] : cranial nerves 2-12 were intact [Oriented x3] : oriented to person, place, and time [Normal Rate] : heart rate was normal [Regular Rhythm] : with a regular rhythm [No Edema] : no peripheral edema [de-identified] : small nodularities b/l.  [de-identified] : No edema in LE [de-identified] : uses cane to ambulate

## 2021-12-31 NOTE — ED ADULT NURSE NOTE - NSSUHOSCREENINGYN_ED_ALL_ED
Pt here for BP check and lab update  He felt anxious this morning and worried his K was low  BP was good  No orthostatic drop  He was brought up in wheelchair but could walk promptly to the exam room, also from exam room to bathroom and back without limitation.    Pulse normal  Pulse ox normal    Labs reviewed with pt and his significant other on the phone.  Normal K  He felt reassurred and will keep visit next week for further update    Lungs clear.   Trace edema bilaterally     Yes - the patient is able to be screened

## 2022-01-12 ENCOUNTER — APPOINTMENT (OUTPATIENT)
Dept: HEART AND VASCULAR | Facility: CLINIC | Age: 87
End: 2022-01-12

## 2022-01-14 NOTE — REASON FOR VISIT
Problem: Fatigue  Goal: Improved Activity Tolerance  Outcome: Ongoing, Progressing  Intervention: Promote Improved Energy  Flowsheets (Taken 1/14/2022 1105)  Fatigue Management:   frequent rest breaks encouraged   fatigue-related activity identified   paced activity encouraged  Sleep/Rest Enhancement:   regular sleep/rest pattern promoted   relaxation techniques promoted      [Follow - Up] : a follow-up visit [DM Type 2] : DM Type 2 [Thyroid nodule/ MNG] : thyroid nodule/ MNG

## 2022-01-20 ENCOUNTER — APPOINTMENT (OUTPATIENT)
Dept: HEART AND VASCULAR | Facility: CLINIC | Age: 87
End: 2022-01-20
Payer: MEDICARE

## 2022-01-20 VITALS
HEART RATE: 78 BPM | HEIGHT: 59 IN | WEIGHT: 117 LBS | TEMPERATURE: 98 F | BODY MASS INDEX: 23.59 KG/M2 | DIASTOLIC BLOOD PRESSURE: 70 MMHG | OXYGEN SATURATION: 98 % | SYSTOLIC BLOOD PRESSURE: 163 MMHG

## 2022-01-20 VITALS — SYSTOLIC BLOOD PRESSURE: 160 MMHG | DIASTOLIC BLOOD PRESSURE: 69 MMHG

## 2022-01-20 DIAGNOSIS — M79.675 PAIN IN LEFT TOE(S): ICD-10-CM

## 2022-01-20 PROCEDURE — 99215 OFFICE O/P EST HI 40 MIN: CPT | Mod: 25

## 2022-01-20 PROCEDURE — 93970 EXTREMITY STUDY: CPT

## 2022-01-20 PROCEDURE — 93925 LOWER EXTREMITY STUDY: CPT

## 2022-01-27 ENCOUNTER — APPOINTMENT (OUTPATIENT)
Dept: NEPHROLOGY | Facility: CLINIC | Age: 87
End: 2022-01-27

## 2022-02-24 ENCOUNTER — APPOINTMENT (OUTPATIENT)
Dept: HEART AND VASCULAR | Facility: CLINIC | Age: 87
End: 2022-02-24
Payer: MEDICARE

## 2022-02-24 VITALS
DIASTOLIC BLOOD PRESSURE: 62 MMHG | WEIGHT: 108 LBS | BODY MASS INDEX: 21.77 KG/M2 | SYSTOLIC BLOOD PRESSURE: 130 MMHG | HEART RATE: 63 BPM | HEIGHT: 59 IN | TEMPERATURE: 98.6 F

## 2022-02-24 DIAGNOSIS — Z01.818 ENCOUNTER FOR OTHER PREPROCEDURAL EXAMINATION: ICD-10-CM

## 2022-02-24 PROCEDURE — 99214 OFFICE O/P EST MOD 30 MIN: CPT | Mod: 57,25

## 2022-02-24 PROCEDURE — 93923 UPR/LXTR ART STDY 3+ LVLS: CPT

## 2022-03-02 ENCOUNTER — APPOINTMENT (OUTPATIENT)
Dept: HEART AND VASCULAR | Facility: CLINIC | Age: 87
End: 2022-03-02
Payer: MEDICARE

## 2022-03-02 ENCOUNTER — APPOINTMENT (OUTPATIENT)
Dept: ENDOCRINOLOGY | Facility: CLINIC | Age: 87
End: 2022-03-02
Payer: MEDICARE

## 2022-03-02 VITALS
WEIGHT: 109 LBS | SYSTOLIC BLOOD PRESSURE: 126 MMHG | OXYGEN SATURATION: 96 % | TEMPERATURE: 97.5 F | DIASTOLIC BLOOD PRESSURE: 60 MMHG | HEIGHT: 59 IN | BODY MASS INDEX: 21.97 KG/M2 | HEART RATE: 71 BPM

## 2022-03-02 VITALS
DIASTOLIC BLOOD PRESSURE: 67 MMHG | SYSTOLIC BLOOD PRESSURE: 127 MMHG | HEART RATE: 66 BPM | BODY MASS INDEX: 21.81 KG/M2 | WEIGHT: 108 LBS

## 2022-03-02 LAB — GLUCOSE BLDC GLUCOMTR-MCNC: 132

## 2022-03-02 PROCEDURE — 99214 OFFICE O/P EST MOD 30 MIN: CPT | Mod: 25

## 2022-03-02 PROCEDURE — 93000 ELECTROCARDIOGRAM COMPLETE: CPT

## 2022-03-02 PROCEDURE — 93306 TTE W/DOPPLER COMPLETE: CPT

## 2022-03-02 PROCEDURE — 82962 GLUCOSE BLOOD TEST: CPT

## 2022-03-02 NOTE — DISCUSSION/SUMMARY
[FreeTextEntry1] : EKG: sinus bradycardia 57/min,  Anteroseptal infarct (old).\par ECHO 3/22: normal LVEF, moderate TR, mild MR, calcified AV, no stenosis. LVEF improved compared to prior (30%)\par \par 1. CAD - new onset CP, c/w meds. NST b/o CKD\par 2. CKD - f/u with nephrology\par 3. DM - f/u with endo.\par 4. PVD - MRA scheduled\par 5. DELUCA - stable \par 6. RTC 1 month\par \par

## 2022-03-02 NOTE — HISTORY OF PRESENT ILLNESS
[FreeTextEntry1] : 85 y/o woman with hx of CABG, multiple PCIs, PVD, DM, CKD. Patient is now compliant, taking her meds every day. She has noticed chest discomfort for the last 1-2 months, progressively getting worse. No associated sob, dizziness or syncope. Walking distance about a block.\par

## 2022-03-02 NOTE — REVIEW OF SYSTEMS
[Dyspnea on exertion] : dyspnea during exertion [Fever] : no fever [Headache] : no headache [Blurry Vision] : no blurred vision [Seeing Double (Diplopia)] : no diplopia [Earache] : no earache [Discharge From Ears] : no discharge from the ears [SOB] : no shortness of breath [Chest Discomfort] : no chest discomfort [Leg Claudication] : no intermittent leg claudication [Palpitations] : no palpitations [Syncope] : no syncope [Cough] : no cough [Wheezing] : no wheezing [Abdominal Pain] : no abdominal pain [Nausea] : no nausea [Change in Appetite] : no change in appetite [Dysuria] : no dysuria [Joint Pain] : no joint pain [Rash] : no rash [Dizziness] : no dizziness [Confusion] : no confusion was observed

## 2022-03-04 NOTE — ASSESSMENT
[FreeTextEntry1] : 87 y/o F pt with:\par \par 1. T2DM dx in 2016:\par Pt's DM has been well controlled through her diet. \par She takes glucose BS sporadically and the numbers are always in target. Denies hypoglycemias. \par Pt's daughter reports that the pt's vascular symptoms have intensified, causing pain and swelling. \par Her A1c was 6.1% on 02/10/22. She maintained adequate levels of glucose on diet alone. \par \par 2. Slight decrease in TSH level, intermittent:\par Pt had FNA biopsy on 03/02/17: L upper pole 1.0 cm nodule, [place] II. \par On 10/07/20, TSH was 0.16. \par The ordered TSH rAb was never done, so we are ordering it again.\par Also considering Thyroid uptaking scan. \par \par 3. PAD diagnosed few years ago:\par Patient was following a vascular team at [facility] and has apparently had LE revascularization procedures. Over the past few months, she was complaining of LE vascular claudication and her leg pain has intensified, experiencing some discoloration on her toes. She has seen the cardiologist, Dr. Segundo Callahan, late last year and some studies have been completed. It shows that there is evidence of significant b/l PAD and severe to moderate arterial.\par Addressing risk factors to prevent deterioration of PAD.\par Pt is on ASA, ARB, and statins: Lipitor 40 mg. Assessing for reauthorization by cardiologist and vascular. \par She also complained of RUE pain for the past few months. \par \par Return in: 2 months. \par  [Diabetes Foot Care] : diabetes foot care [Importance of Diet and Exercise] : importance of diet and exercise to improve glycemic control, achieve weight loss and improve cardiovascular health

## 2022-03-04 NOTE — HISTORY OF PRESENT ILLNESS
[FreeTextEntry1] : 87 y/o F pt, with Hx of T2DM (dx in 2016) and DM related complications of peripheral neuropathy, CKD and CAD- cardiac stent in 2017, and Hx of b/l thyroid nodules with negative FNA biopsy in 2017. \par Other PMHx: Osteoporosis, Hypercholesterolemia, HTN\par SHx: Lives alone. Has an aid who works 4 hrs daily. \par Does not check her BS. \par Vascular DrCharlie. \par Last cardiologist, Dr. London Raymond, visit: 03/02/22.\par Pt participated in the [facility], and was discharged from it to f/u with her PCP/Geriatric Dr. Reyes. \par  \par 07/15/2021\par Pt presents today with POCT 96, /71 and BMI 22.42 for DM f/u. She is feeling not well with c/o trouble walking. Her nephrologist Dr. Soler has advised to start using walker. Pt does not remember if she has ever been on osteoporosis treatment; she states she may have been on arthritis medication before. \par Pt follows regularly with Dr. Gimenez at [facility]; her last visit was 2 weeks ago. \par She has lost 3 lbs in last 5 months. \par Pt's daughter Jaimee was contacted who stated that pt is on Plavix and statin but she is not sure if pt is taking statin regularly. \par \par 12/15/2021\par Pt presents today for DM f/u with POCT 89, /75 and BMI 22.62. She gained 1 lb in 5 months. \par Pt is feeling forgetful, with c/o fatigue. She claims she does not eat well despite having a nurse aid coming to assist her for a couple hours. Pt does not check her BS. \par \par 03/02/2022\par Pt presents today accompanied by her daughter for DM f/u. Today, her POCT 132, /67 and BMI 21.81. Her weight remains unchanged from last visit. \par Pt has history of peripheral vascular disease and history of stents performed years ago. Over the past few months, she was suffering from increased leg pain and swelling. Therefore, she saw a cardiologist (Dr. London Raymond) 3 months ago and vascular surgeon (Dr. Guerra) 2 weeks ago. As per pt, she was informed of the possibility of having to undergo another stent and is scheduled for LE MRA. Ultimately, she was diagnosed with "blockage". \par \par Today, pt is feeling concerned because of poor circulation in her extremities. Pt endorses numbness at the tip of her thumb and first 2 fingers on her R hand, which were present since January. She denies numbness in her L hand. As per pt, she has seen doctors for her numbness. \par Pt is not taking any medications for DM. She has bad eye sight for which she takes Lasix and is scheduled for an appointment next week. \par Denies osmotic diuresis symptoms.  \par \par Current Medications: Plavix 75 mg, Vitamin D, Lasix (for eye), Amlodipine 5 mg, Folic acid 1 mg, Metoprolol ER 50 mg qd, Citalopram 10 mg, Clopidogrel 75 mg, Valsartan 160 mg, Atorvastatin 80 mg\par \par Labs:\par - 02/26/21: ACR ratio 29, Urine protein 30 mg (A)\par - 02/10/21: A1c 6.1%, s.creat 1.10, TSH 0.20, Free T4 1.4\par - 10/07/20: A1c 5.9%, s.creat 0.95, Cholesterol 234, LDL-c 156, TSH 0.16, Free T4 1.4, Free T3 3.39, Ca 10.2, Vit D 25- OH 23.2, ACTH Stim Cortisol Baseline 9.0, ACTH Stim Cortisol 30 min 25.6, ACTH Stim Cortisol 60 min 29.3\par - 10/06/20: urine no protein, ACTH Stim ACTH Baseline 5.3\par - 02/08/19: s.creat 1.15, Cholesterol 212, LDL-c 138, Vit D 25-OH 25.1, Vit B12 753\par - 12/18/18: s.creat 1.08, Cholesterol 257, LDL-c 167, iPTH 79, Ca 10.1, Vit D 25-OH 22\par - 12/10/18: POCT A1c 5.8%\par - 08/22/17: A1c 6%, s.creat 1.09, LDL-c 152\par \par Imaging:\par - 04/24/17 FNA L upper pole 1.0 cm nodule: [place] II (pt also had an FNA biopsy on 1/28/16)\par - 03/02/17 Thyroid US: R lobe contains 3 nodules. Nodule 1 - midportion 1.1 x 0.8 x 0.8 cm spongiform. Nodule 2 - midportion 0.6 x 0.4 x 0.7 cm spongiform. Nodule 3 - lower pole 0.5 x 0.3 x 0.4 cm spongiform. L lobe contains 2 nodules. Nodule 1 - superior to midportion 1.0 x 0.6 x 1.0 cm hypoechoic solid with cystic components (FNA recommended). Nodule 2 - mid to lower portion 2.1 x 1.2 x 1.4 cm spongiform. Isthmus contains one midportion 0.5 x 0.3 x 0.6 cm spongiform nodule. \par - 06/24/16 BMD: Left Hip (Total) T-score -1.2. Femoral neck T-score -2.2. Spine T-score -1.6. Total Radius T-score -2.6. Radius segment 1/3 from the wrist to the elbow T-score -2.2. Impression: There is diminished bone density in the left hip, lumbar spine and in the segment one third of the way from the wrist to the elbow. Overall bone density in the L radius is in the osteoporotic range. \par

## 2022-03-04 NOTE — REVIEW OF SYSTEMS
[Pain/Numbness of Digits] : pain/numbness of digits [As Noted in HPI] : as noted in HPI [Negative] : Constitutional [Polyuria] : no polyuria [Dysuria] : no dysuria [Nocturia] : no nocturia [FreeTextEntry9] : Leg pain and swelling. Poor circulation in extremities.  [de-identified] : Morris numbness in L hand.

## 2022-03-04 NOTE — PHYSICAL EXAM
[Alert] : alert [Normal Sclera/Conjunctiva] : normal sclera/conjunctiva [Normal Outer Ear/Nose] : the ears and nose were normal in appearance [Clear to Auscultation] : lungs were clear to auscultation bilaterally [Normal Rate] : heart rate was normal [Regular Rhythm] : with a regular rhythm [No Edema] : no peripheral edema [Normal Bowel Sounds] : normal bowel sounds [Kyphosis] : kyphosis present [No Stigmata of Cushings Syndrome] : no stigmata of Cushings Syndrome [Cranial Nerves Intact] : cranial nerves 2-12 were intact [Oriented x3] : oriented to person, place, and time [de-identified] : Small nodularities palpated b/l.  [de-identified] : No edema in LE. [de-identified] : uses cane to ambulate

## 2022-03-04 NOTE — ADDENDUM
[FreeTextEntry1] : I Frank Juan act soley as a scribe for Dr. Denton Mckenzie on this date. 03/02/2022

## 2022-03-04 NOTE — END OF VISIT
[FreeTextEntry3] : All medical record entries made by the Scribe were at my, Dr. Denton Mckenzie, direction and personally dictated by me on 03/02/2022. I have reviewed the chart and agree that the record accurately reflects my personal performance of the history, physical exam, assessment and plan. I have also personally directed, reviewed and agreed with the chart.  [Time Spent: ___ minutes] : I have spent [unfilled] minutes of time on the encounter.

## 2022-03-04 NOTE — DATA REVIEWED
[FreeTextEntry1] : Labs:\par - 02/26/21: ACR ratio 29, Urine protein 30 mg (A)\par - 02/10/21: A1c 6.1%, s.creat 1.10, TSH 0.20, Free T4 1.4\par - 10/07/20: A1c 5.9%, s.creat 0.95, Cholesterol 234, LDL-c 156, TSH 0.16, Free T4 1.4, Free T3 3.39, Ca 10.2, Vit D 25- OH 23.2, ACTH Stim Cortisol Baseline 9.0, ACTH Stim Cortisol 30 min 25.6, ACTH Stim Cortisol 60 min 29.3\par - 10/06/20: urine no protein, ACTH Stim ACTH Baseline 5.3\par - 02/08/19: s.creat 1.15, Cholesterol 212, LDL-c 138, Vit D 25-OH 25.1, Vit B12 753\par - 12/18/18: s.creat 1.08, Cholesterol 257, LDL-c 167, iPTH 79, Ca 10.1, Vit D 25-OH 22\par - 12/10/18: POCT A1c 5.8%\par - 08/22/17: A1c 6%, s.creat 1.09, LDL-c 152\par \par Imaging:\par - 04/24/17 FNA L upper pole 1.0 cm nodule: [place] II (pt also had an FNA biopsy on 1/28/16)\par - 03/02/17 Thyroid US: R lobe contains 3 nodules. Nodule 1 - midportion 1.1 x 0.8 x 0.8 cm spongiform. Nodule 2 - midportion 0.6 x 0.4 x 0.7 cm spongiform. Nodule 3 - lower pole 0.5 x 0.3 x 0.4 cm spongiform. L lobe contains 2 nodules. Nodule 1 - superior to midportion 1.0 x 0.6 x 1.0 cm hypoechoic solid with cystic components (FNA recommended). Nodule 2 - mid to lower portion 2.1 x 1.2 x 1.4 cm spongiform. Isthmus contains one midportion 0.5 x 0.3 x 0.6 cm spongiform nodule. \par - 06/24/16 BMD: Left Hip (Total) T-score -1.2. Femoral neck T-score -2.2. Spine T-score -1.6. Total Radius T-score -2.6. Radius segment 1/3 from the wrist to the elbow T-score -2.2. Impression: There is diminished bone density in the left hip, lumbar spine and in the segment one third of the way from the wrist to the elbow. Overall bone density in the L radius is in the osteoporotic range. \par

## 2022-03-07 ENCOUNTER — APPOINTMENT (OUTPATIENT)
Dept: HEART AND VASCULAR | Facility: CLINIC | Age: 87
End: 2022-03-07
Payer: MEDICARE

## 2022-03-07 VITALS
HEIGHT: 59 IN | DIASTOLIC BLOOD PRESSURE: 70 MMHG | TEMPERATURE: 98.6 F | HEART RATE: 65 BPM | RESPIRATION RATE: 16 BRPM | WEIGHT: 108 LBS | OXYGEN SATURATION: 96 % | SYSTOLIC BLOOD PRESSURE: 124 MMHG | BODY MASS INDEX: 21.77 KG/M2

## 2022-03-07 PROCEDURE — 78452 HT MUSCLE IMAGE SPECT MULT: CPT

## 2022-03-07 PROCEDURE — 93015 CV STRESS TEST SUPVJ I&R: CPT

## 2022-03-07 PROCEDURE — A9500: CPT

## 2022-03-14 ENCOUNTER — RESULT REVIEW (OUTPATIENT)
Age: 87
End: 2022-03-14

## 2022-03-14 ENCOUNTER — APPOINTMENT (OUTPATIENT)
Dept: MRI IMAGING | Facility: HOSPITAL | Age: 87
End: 2022-03-14
Payer: MEDICARE

## 2022-03-14 ENCOUNTER — OUTPATIENT (OUTPATIENT)
Dept: OUTPATIENT SERVICES | Facility: HOSPITAL | Age: 87
LOS: 1 days | End: 2022-03-14
Payer: MEDICARE

## 2022-03-14 DIAGNOSIS — Z95.5 PRESENCE OF CORONARY ANGIOPLASTY IMPLANT AND GRAFT: Chronic | ICD-10-CM

## 2022-03-14 PROCEDURE — 72198 MR ANGIO PELVIS W/O & W/DYE: CPT | Mod: MH

## 2022-03-14 PROCEDURE — 73725 MR ANG LWR EXT W OR W/O DYE: CPT | Mod: 26,50,MH

## 2022-03-14 PROCEDURE — 74185 MRA ABD W OR W/O CNTRST: CPT | Mod: MH

## 2022-03-14 PROCEDURE — 74185 MRA ABD W OR W/O CNTRST: CPT | Mod: 26,MH

## 2022-03-14 PROCEDURE — 72198 MR ANGIO PELVIS W/O & W/DYE: CPT | Mod: 26,MH

## 2022-03-14 PROCEDURE — A9585: CPT

## 2022-03-14 PROCEDURE — 73725 MR ANG LWR EXT W OR W/O DYE: CPT | Mod: MH

## 2022-03-23 ENCOUNTER — APPOINTMENT (OUTPATIENT)
Dept: HEART AND VASCULAR | Facility: CLINIC | Age: 87
End: 2022-03-23
Payer: MEDICARE

## 2022-03-23 ENCOUNTER — NON-APPOINTMENT (OUTPATIENT)
Age: 87
End: 2022-03-23

## 2022-03-23 VITALS
OXYGEN SATURATION: 95 % | WEIGHT: 108 LBS | SYSTOLIC BLOOD PRESSURE: 120 MMHG | HEIGHT: 58 IN | TEMPERATURE: 97.9 F | HEART RATE: 71 BPM | BODY MASS INDEX: 22.67 KG/M2 | DIASTOLIC BLOOD PRESSURE: 55 MMHG

## 2022-03-23 DIAGNOSIS — Z86.79 PERSONAL HISTORY OF OTHER DISEASES OF THE CIRCULATORY SYSTEM: ICD-10-CM

## 2022-03-23 DIAGNOSIS — Z98.62 PERIPHERAL VASCULAR ANGIOPLASTY STATUS: ICD-10-CM

## 2022-03-23 DIAGNOSIS — I25.810 ATHEROSCLEROSIS OF CORONARY ARTERY BYPASS GRAFT(S) W/OUT ANGINA PECTORIS: ICD-10-CM

## 2022-03-23 PROCEDURE — 99214 OFFICE O/P EST MOD 30 MIN: CPT | Mod: 25

## 2022-03-23 PROCEDURE — 93000 ELECTROCARDIOGRAM COMPLETE: CPT

## 2022-03-23 NOTE — HISTORY OF PRESENT ILLNESS
[FreeTextEntry1] : 87 y/o woman with hx of CABG, multiple PCIs, PVD, DM, CKD. Patient is now compliant, taking her meds every day. She has noticed chest discomfort for the last 1-2 months, progressively getting worse. Recent NST shows No associated sob, dizziness or syncope. Walking distance about a block.

## 2022-03-23 NOTE — DISCUSSION/SUMMARY
[FreeTextEntry1] : EKG: Sinus  Bradycardia 58/min, left atrial enlargement. Old anterior infarct.  Nonspecific T-abnormality. \par \par ECHO 3/22: normal LVEF, moderate TR, mild MR, calcified AV, no stenosis. LVEF improved compared to prior (30%)\par NST: EF 49%. Moderate sized scar in the anterior wall and apex with minimal joi-infarct ischemia. Anterior and apical akinesis. \par \par 1. CAD - new onset CP, c/w meds. NST shows minimal joi-infarct ischemia \par med Rx only for now - start ISMO 30mg po daily\par 2. CKD - f/u with nephrology\par 3. DM - f/u with endo.\par 4. PVD - MRA scheduled - pending\par 5. DELUCA - stable \par 6. HLD - c/w atorvastatin 40\par 6. RTC 2 months

## 2022-03-24 ENCOUNTER — APPOINTMENT (OUTPATIENT)
Dept: HEART AND VASCULAR | Facility: CLINIC | Age: 87
End: 2022-03-24
Payer: MEDICARE

## 2022-03-24 DIAGNOSIS — R20.2 ANESTHESIA OF SKIN: ICD-10-CM

## 2022-03-24 DIAGNOSIS — R26.2 DIFFICULTY IN WALKING, NOT ELSEWHERE CLASSIFIED: ICD-10-CM

## 2022-03-24 DIAGNOSIS — R20.0 ANESTHESIA OF SKIN: ICD-10-CM

## 2022-03-24 PROCEDURE — 99443: CPT | Mod: 95

## 2022-03-26 PROBLEM — R26.2 IMPAIRED AMBULATION: Status: ACTIVE | Noted: 2022-03-26

## 2022-05-25 ENCOUNTER — APPOINTMENT (OUTPATIENT)
Dept: HEART AND VASCULAR | Facility: CLINIC | Age: 87
End: 2022-05-25
Payer: MEDICARE

## 2022-05-25 VITALS
HEART RATE: 70 BPM | SYSTOLIC BLOOD PRESSURE: 100 MMHG | HEIGHT: 58 IN | OXYGEN SATURATION: 96 % | DIASTOLIC BLOOD PRESSURE: 68 MMHG | TEMPERATURE: 97.7 F | WEIGHT: 103 LBS | BODY MASS INDEX: 21.62 KG/M2

## 2022-05-25 PROCEDURE — 93000 ELECTROCARDIOGRAM COMPLETE: CPT

## 2022-05-25 PROCEDURE — 99214 OFFICE O/P EST MOD 30 MIN: CPT | Mod: 25

## 2022-05-25 RX ORDER — BRINZOLAMIDE 10 MG/ML
1 SUSPENSION/ DROPS OPHTHALMIC
Qty: 10 | Refills: 0 | Status: COMPLETED | COMMUNITY
Start: 2021-12-09

## 2022-05-25 RX ORDER — BRINZOLAMIDE/BRIMONIDINE TARTRATE 10; 2 MG/ML; MG/ML
1-0.2 SUSPENSION/ DROPS OPHTHALMIC
Qty: 8 | Refills: 0 | Status: COMPLETED | COMMUNITY
Start: 2021-05-25

## 2022-05-25 RX ORDER — LEVOFLOXACIN 750 MG/1
750 TABLET, FILM COATED ORAL
Qty: 5 | Refills: 0 | Status: COMPLETED | COMMUNITY
Start: 2022-01-31

## 2022-05-25 NOTE — DISCUSSION/SUMMARY
[FreeTextEntry1] : EKG: Sinus  Bradycardia 58/min, left atrial enlargement. Old anterior infarct.  Nonspecific T-abnormality. \par \par ECHO 3/22: normal LVEF, moderate TR, mild MR, calcified AV, no stenosis. LVEF improved compared to prior (30%)\par NST: EF 49%. Moderate sized scar in the anterior wall and apex with minimal joi-infarct ischemia. Anterior and apical akinesis. \par \par 1. CAD - c/w meds. NST shows minimal joi-infarct ischemia \par med Rx only for now - ISMO 30mg po daily\par 2. CKD - f/u with nephrology\par 3. DM - f/u with endo.\par 4. PVD -severe disease R>L, med rx for now\par 5. DELUCA - stable \par 6. HLD - c/w atorvastatin 40\par 6. RTC 3 months

## 2022-05-25 NOTE — REVIEW OF SYSTEMS
[Dyspnea on exertion] : dyspnea during exertion [Fever] : no fever [Headache] : no headache [Blurry Vision] : no blurred vision [Seeing Double (Diplopia)] : no diplopia [Earache] : no earache [Discharge From Ears] : no discharge from the ears [SOB] : no shortness of breath [Chest Discomfort] : chest discomfort [Leg Claudication] : no intermittent leg claudication [Palpitations] : no palpitations [Syncope] : no syncope [Cough] : no cough [Wheezing] : no wheezing [Abdominal Pain] : no abdominal pain [Nausea] : no nausea [Change in Appetite] : no change in appetite [Dysuria] : no dysuria [Joint Pain] : no joint pain [Rash] : no rash [Dizziness] : no dizziness [Confusion] : no confusion was observed

## 2022-05-25 NOTE — HISTORY OF PRESENT ILLNESS
[FreeTextEntry1] : 87 y/o woman with hx of CABG, multiple PCIs, PVD, DM, CKD. She has noticed chest discomfort for the last 1-2 months, progressively getting worse.  No associated sob, dizziness or syncope. Walking distance about a block.

## 2022-05-26 LAB
ALBUMIN SERPL ELPH-MCNC: 4.8 G/DL
ALP BLD-CCNC: 95 U/L
ALT SERPL-CCNC: 16 U/L
ANION GAP SERPL CALC-SCNC: 14 MMOL/L
AST SERPL-CCNC: 28 U/L
BASOPHILS # BLD AUTO: 0.07 K/UL
BASOPHILS NFR BLD AUTO: 0.8 %
BILIRUB SERPL-MCNC: 0.7 MG/DL
BUN SERPL-MCNC: 18 MG/DL
CALCIUM SERPL-MCNC: 10.7 MG/DL
CHLORIDE SERPL-SCNC: 101 MMOL/L
CHOLEST SERPL-MCNC: 199 MG/DL
CO2 SERPL-SCNC: 22 MMOL/L
CREAT SERPL-MCNC: 0.89 MG/DL
EGFR: 63 ML/MIN/1.73M2
EOSINOPHIL # BLD AUTO: 0.21 K/UL
EOSINOPHIL NFR BLD AUTO: 2.4 %
ESTIMATED AVERAGE GLUCOSE: 114 MG/DL
GLUCOSE SERPL-MCNC: 72 MG/DL
HBA1C MFR BLD HPLC: 5.6 %
HCT VFR BLD CALC: 39 %
HDLC SERPL-MCNC: 57 MG/DL
HGB BLD-MCNC: 12.2 G/DL
IMM GRANULOCYTES NFR BLD AUTO: 0.3 %
LDLC SERPL CALC-MCNC: 124 MG/DL
LYMPHOCYTES # BLD AUTO: 1.78 K/UL
LYMPHOCYTES NFR BLD AUTO: 20.1 %
MAN DIFF?: NORMAL
MCHC RBC-ENTMCNC: 28.8 PG
MCHC RBC-ENTMCNC: 31.3 GM/DL
MCV RBC AUTO: 92 FL
MONOCYTES # BLD AUTO: 0.63 K/UL
MONOCYTES NFR BLD AUTO: 7.1 %
NEUTROPHILS # BLD AUTO: 6.14 K/UL
NEUTROPHILS NFR BLD AUTO: 69.3 %
NONHDLC SERPL-MCNC: 142 MG/DL
PLATELET # BLD AUTO: 248 K/UL
POTASSIUM SERPL-SCNC: 6 MMOL/L
PROT SERPL-MCNC: 8.2 G/DL
RBC # BLD: 4.24 M/UL
RBC # FLD: 13.8 %
SODIUM SERPL-SCNC: 137 MMOL/L
TRIGL SERPL-MCNC: 93 MG/DL
WBC # FLD AUTO: 8.86 K/UL

## 2022-05-27 ENCOUNTER — APPOINTMENT (OUTPATIENT)
Dept: HEART AND VASCULAR | Facility: CLINIC | Age: 87
End: 2022-05-27
Payer: MEDICARE

## 2022-05-27 PROCEDURE — 36415 COLL VENOUS BLD VENIPUNCTURE: CPT

## 2022-06-01 LAB
ANION GAP SERPL CALC-SCNC: 12 MMOL/L
BUN SERPL-MCNC: 18 MG/DL
CALCIUM SERPL-MCNC: 11 MG/DL
CHLORIDE SERPL-SCNC: 101 MMOL/L
CO2 SERPL-SCNC: 25 MMOL/L
CREAT SERPL-MCNC: 0.97 MG/DL
EGFR: 57 ML/MIN/1.73M2
GLUCOSE SERPL-MCNC: 92 MG/DL
POTASSIUM SERPL-SCNC: 4.2 MMOL/L
SODIUM SERPL-SCNC: 138 MMOL/L

## 2022-06-08 ENCOUNTER — APPOINTMENT (OUTPATIENT)
Dept: ENDOCRINOLOGY | Facility: CLINIC | Age: 87
End: 2022-06-08
Payer: MEDICARE

## 2022-06-08 VITALS
HEART RATE: 52 BPM | DIASTOLIC BLOOD PRESSURE: 68 MMHG | SYSTOLIC BLOOD PRESSURE: 148 MMHG | BODY MASS INDEX: 21.74 KG/M2 | WEIGHT: 104 LBS

## 2022-06-08 PROCEDURE — 82962 GLUCOSE BLOOD TEST: CPT

## 2022-06-08 PROCEDURE — 99214 OFFICE O/P EST MOD 30 MIN: CPT | Mod: 25

## 2022-06-08 NOTE — REASON FOR VISIT
[Follow - Up] : a follow-up visit [DM Type 2] : DM Type 2 [Thyroid nodule/ MNG] : thyroid nodule/ MNG [Family Member] : family member

## 2022-06-09 LAB — GLUCOSE BLDC GLUCOMTR-MCNC: 95

## 2022-06-09 NOTE — DATA REVIEWED
[FreeTextEntry1] : Labs:\par - 05/27/22: s.creat 0.97, Ca 11.0 (H), eGFR 57 (L)\par - 05/25/22: A1c 5.6%, s.creat 0.89, Ca 10.7 (H), LDL-c 124 (H)\par - 03/04/22: A1c 6.2% (H), Free T4 0.95, Free T3 2.39, LDL-c 119.20, s.creat 1.05\par - 02/26/21: ACR ratio 29, Urine protein 30 mg (A)\par - 02/10/21: A1c 6.1%, s.creat 1.10, TSH 0.20, Free T4 1.4\par - 10/07/20: A1c 5.9%, s.creat 0.95, Cholesterol 234, LDL-c 156, TSH 0.16, Free T4 1.4, Free T3 3.39, Ca 10.2, Vit D 25- OH 23.2, ACTH Stim Cortisol Baseline 9.0, ACTH Stim Cortisol 30 min 25.6, ACTH Stim Cortisol 60 min 29.3\par - 10/06/20: urine no protein, ACTH Stim ACTH Baseline 5.3\par - 02/08/19: s.creat 1.15, Cholesterol 212, LDL-c 138, Vit D 25-OH 25.1, Vit B12 753\par - 12/18/18: s.creat 1.08, Cholesterol 257, LDL-c 167, iPTH 79, Ca 10.1, Vit D 25-OH 22\par - 12/10/18: POCT A1c 5.8%\par - 08/22/17: A1c 6%, s.creat 1.09, LDL-c 152\par \par Imaging:\par - 04/24/17 FNA L upper pole 1.0 cm nodule: [place] II (pt also had an FNA biopsy on 1/28/16)\par - 03/02/17 Thyroid US: R lobe contains 3 nodules. Nodule 1 - midportion 1.1 x 0.8 x 0.8 cm spongiform. Nodule 2 - midportion 0.6 x 0.4 x 0.7 cm spongiform. Nodule 3 - lower pole 0.5 x 0.3 x 0.4 cm spongiform. L lobe contains 2 nodules. Nodule 1 - superior to midportion 1.0 x 0.6 x 1.0 cm hypoechoic solid with cystic components (FNA recommended). Nodule 2 - mid to lower portion 2.1 x 1.2 x 1.4 cm spongiform. Isthmus contains one midportion 0.5 x 0.3 x 0.6 cm spongiform nodule. \par - 06/24/16 BMD: Left Hip (Total) T-score -1.2. Femoral neck T-score -2.2. Spine T-score -1.6. Total Radius T-score -2.6. Radius segment 1/3 from the wrist to the elbow T-score -2.2. Impression: There is diminished bone density in the left hip, lumbar spine and in the segment one third of the way from the wrist to the elbow. Overall bone density in the L radius is in the osteoporotic range. \par

## 2022-06-09 NOTE — HISTORY OF PRESENT ILLNESS
[FreeTextEntry1] : 87 y/o F pt, with Hx of T2DM (dx in 2016) and DM related complications of peripheral neuropathy, CKD and CAD- cardiac stent in 2017, and Hx of b/l thyroid nodules with negative FNA biopsy in 2017. \par Glucose monitoring: Does not check her BS. \par Other PMHx: Osteoporosis (unsure if she was on medication; was on arthritis medication), Hypercholesterolemia, HTN\par SHx: Lives alone. Has an aid who works 4 hrs daily. Pt's children cooks for her.\par Partially edentulous. Uses walker due to trouble walking.  \par Sees Vascular DrCharlie and Dr. Gimenez at [facility]\par Last cardiologist, Dr. London Raymond, visit: 03/02/22.\par Pt participated in the [facility], and was discharged from it to f/u with her PCP/Geriatric Dr. Reyes. \par \par 03/02/2022\par Pt presents today accompanied by her daughter for DM f/u. Today, her POCT 132, /67 and BMI 21.81. Her weight remains unchanged from last visit. \par Pt has history of peripheral vascular disease and history of stents performed years ago. Over the past few months, she was suffering from increased leg pain and swelling. Therefore, she saw a cardiologist (Dr. London Raymond) 3 months ago and vascular surgeon (Dr. Guerra) 2 weeks ago. As per pt, she was informed of the possibility of having to undergo another stent and is scheduled for LE MRA. Ultimately, she was diagnosed with "blockage". \par \par Today, pt is feeling concerned because of poor circulation in her extremities. Pt endorses numbness at the tip of her thumb and first 2 fingers on her R hand, which were present since January. She denies numbness in her L hand. As per pt, she has seen doctors for her numbness. \par Pt is not taking any medications for DM. She has bad eye sight for which she takes Lasix and is scheduled for an appointment next week. \par Denies osmotic diuresis symptoms.  \par \par 06/08/2022\par Pt is accompanied by daughter with POCT 95, /68 and BMI 21.74.  \par Today, pt is struggling mainly with diarrhea qd (Had colonoscopy 2 yrs ago). She endorses chest pain near her heart and loss of appetite. Chewing is also a problem because she is partially edentulous. She lost 4 lbs in 3 months as a result. \par Denies palpitations.\par \par Current Medications: Plavix 75 mg, Vitamin D, Lasix (for eye), Amlodipine 5 mg, Folic acid 1 mg, Pantoprazole 20 mg, Valsartan 160 mg, Metoprolol ER 50 mg qd, Citalopram 10 mg, Clopidogrel 75 mg,  Atorvastatin 80 mg, Smooth move (for diarrhea), ASA ,Vit E\par

## 2022-06-09 NOTE — ASSESSMENT
[FreeTextEntry1] : 87 y/o F pt with:\par \par 1. T2DM diagnosed in 2016: Diet control\par Pt's appetite is decreased and she complains of diarrhea. She is becoming more selective in her diet, resulting in weight loss of 4 lbs since 03/2022. Refer to RD to improve her calorie intake. \par \par 2. Slight decrease in TSH level, intermittent:\par 3/4/2022 T3 2.39 and T4 free, TSH  0.12\par Pt is clinically and biochemically euthyroid. \par Will continue monitoring her TSH levels. \par \par Return in: 6 months. \par  [Carbohydrate Consistent Diet] : carbohydrate consistent diet

## 2022-06-09 NOTE — PHYSICAL EXAM
[Alert] : alert [Normal Sclera/Conjunctiva] : normal sclera/conjunctiva [Normal Outer Ear/Nose] : the ears and nose were normal in appearance [Clear to Auscultation] : lungs were clear to auscultation bilaterally [Normal Rate] : heart rate was normal [Regular Rhythm] : with a regular rhythm [No Edema] : no peripheral edema [Normal Bowel Sounds] : normal bowel sounds [Kyphosis] : kyphosis present [No Stigmata of Cushings Syndrome] : no stigmata of Cushings Syndrome [Cranial Nerves Intact] : cranial nerves 2-12 were intact [Oriented x3] : oriented to person, place, and time [de-identified] : Small nodularities palpated b/l.  [de-identified] : No edema in LE. [de-identified] : uses cane to ambulate

## 2022-06-09 NOTE — ADDENDUM
[FreeTextEntry1] : I Frank Juan act soley as a scribe for Dr. Denton Mckenzie on this date. 06/08/2022

## 2022-06-09 NOTE — REVIEW OF SYSTEMS
[Decreased Appetite] : decreased appetite [Recent Weight Loss (___ Lbs)] : recent weight loss: [unfilled] lbs [Chest Pain] : chest pain [As Noted in HPI] : as noted in HPI [Diarrhea] : diarrhea [Negative] : Neurological [Palpitations] : no palpitations [FreeTextEntry9] : Leg pain and swelling. Poor circulation in extremities.

## 2022-06-09 NOTE — END OF VISIT
[FreeTextEntry3] : All medical record entries made by the Scribe were at my, Dr. Denton Mckenzie, direction and personally dictated by me on 06/08/2022. I have reviewed the chart and agree that the record accurately reflects my personal performance of the history, physical exam, assessment and plan. I have also personally directed, reviewed and agreed with the chart.  [Time Spent: ___ minutes] : I have spent [unfilled] minutes of time on the encounter.

## 2022-06-22 ENCOUNTER — APPOINTMENT (OUTPATIENT)
Dept: ENDOCRINOLOGY | Facility: CLINIC | Age: 87
End: 2022-06-22

## 2022-06-23 ENCOUNTER — APPOINTMENT (OUTPATIENT)
Dept: NEPHROLOGY | Facility: CLINIC | Age: 87
End: 2022-06-23
Payer: MEDICARE

## 2022-06-23 VITALS — DIASTOLIC BLOOD PRESSURE: 46 MMHG | HEART RATE: 55 BPM | OXYGEN SATURATION: 99 % | SYSTOLIC BLOOD PRESSURE: 111 MMHG

## 2022-06-23 PROCEDURE — 99214 OFFICE O/P EST MOD 30 MIN: CPT

## 2022-06-23 RX ORDER — BRINZOLAMIDE 10 MG/ML
1 SUSPENSION/ DROPS OPHTHALMIC
Refills: 0 | Status: ACTIVE | COMMUNITY

## 2022-06-23 RX ORDER — PANTOPRAZOLE 20 MG/1
20 TABLET, DELAYED RELEASE ORAL
Refills: 0 | Status: ACTIVE | COMMUNITY

## 2022-06-23 RX ORDER — ATORVASTATIN CALCIUM 40 MG/1
40 TABLET, FILM COATED ORAL
Refills: 0 | Status: DISCONTINUED | COMMUNITY
End: 2022-06-23

## 2022-06-23 NOTE — HISTORY OF PRESENT ILLNESS
[FreeTextEntry1] : 85 yo woman here for f/u evaluation of HTN, proteinuria, CKD, CAD, DMT2, hyperlipidemia, Le edema.\par mild dementia seeing gerontologist Laura Meléndez\par prior hematuria\par No HAs\par not eating as well as she used to- to restart glucerna- repeated this several times to me during OV\par No HAs\par was getting occ CP- imdur added to regimen\par Denies flank pain, dysuria, hematuria or frothy urine \par no SOB, DELUCA\par \par \par \par \par PMH: \par Had colonoscopy 10/19/2018 + diverticular disease\par had urodynamics at [facility]- does not recall results\par  MRI brain possible "ministrokes" or early dementia\par Has 4 cardiac and 4 left leg and 3 right leg stents

## 2022-06-23 NOTE — ASSESSMENT
[FreeTextEntry1] : all lab data was reviewed with patient in detail from 5/25 and 6/8/2022 in EHR\par and 6/21/2022- from daughter's phone ([place] protal)\par peak levels Creat 1.44 Umicroalb/creat 68\par 87 yo woman with CKD 3, HTN, LE edema, DM\par -CKD 3- creat .97- stable; prior peak of 1.44; Na, K, CO2 good\par -hypercalcemia- Ca 11-  repeat ca 6/21- 10.1- good \par -proteinuria- controlled c/w valsartan\par -hematuria-  prior resolved- repeat u/a today- pt not sure if she can leave sample today- okay to do with any upcoming MD visit \par -HTN-  BP controlled- a bit on low side- if future readings < 130 will reduce, dc amlodipine- from EHR some BPs > 140 still\par to roz mckeon- \par emphasized to pt and her daughter that if feel dizzy or lightheaded should hold amlodipine\par -HA- resolved after glaucoma improved\par - LE edema-  trace- acceptable today- limit salt- \par adjust furosemide as needed- keep present dosage \par \par \par f/u 9 months

## 2022-06-23 NOTE — REVIEW OF SYSTEMS
[Feeling Tired] : feeling tired [Eyesight Problems] : eyesight problems [As Noted in HPI] : as noted in HPI [Arthralgias] : arthralgias [Negative] : Psychiatric [FreeTextEntry8] : increased creatinine

## 2022-06-23 NOTE — REASON FOR VISIT
[Follow-Up] : a follow-up visit [Family Member] : family member [Other: _____] : [unfilled] [FreeTextEntry1] : for CKD, HTN, proteinuria

## 2022-06-24 ENCOUNTER — TRANSCRIPTION ENCOUNTER (OUTPATIENT)
Age: 87
End: 2022-06-24

## 2022-06-24 LAB
APPEARANCE: CLEAR
BACTERIA: ABNORMAL
BILIRUBIN URINE: NEGATIVE
BLOOD URINE: NEGATIVE
COLOR: YELLOW
CREAT SPEC-SCNC: 143 MG/DL
GLUCOSE QUALITATIVE U: NEGATIVE
HYALINE CASTS: 0 /LPF
KETONES URINE: NEGATIVE
LEUKOCYTE ESTERASE URINE: ABNORMAL
MICROALBUMIN 24H UR DL<=1MG/L-MCNC: 3.6 MG/DL
MICROALBUMIN/CREAT 24H UR-RTO: 25 MG/G
MICROSCOPIC-UA: NORMAL
NITRITE URINE: NEGATIVE
PH URINE: 6
PROTEIN URINE: NEGATIVE
RED BLOOD CELLS URINE: 1 /HPF
SPECIFIC GRAVITY URINE: 1.02
SQUAMOUS EPITHELIAL CELLS: 5 /HPF
UROBILINOGEN URINE: NORMAL
WHITE BLOOD CELLS URINE: 2 /HPF

## 2022-07-04 LAB
GLUCOSE BLDC GLUCOMTR-MCNC: 89
GLUCOSE BLDC GLUCOMTR-MCNC: 96

## 2022-07-12 ENCOUNTER — APPOINTMENT (OUTPATIENT)
Dept: NEUROLOGY | Facility: CLINIC | Age: 87
End: 2022-07-12

## 2022-07-12 VITALS
OXYGEN SATURATION: 98 % | SYSTOLIC BLOOD PRESSURE: 130 MMHG | DIASTOLIC BLOOD PRESSURE: 50 MMHG | BODY MASS INDEX: 22.88 KG/M2 | RESPIRATION RATE: 14 BRPM | TEMPERATURE: 97.7 F | HEIGHT: 58 IN | HEART RATE: 56 BPM | WEIGHT: 109 LBS

## 2022-07-12 PROCEDURE — 99204 OFFICE O/P NEW MOD 45 MIN: CPT

## 2022-07-12 NOTE — CONSULT LETTER
[Dear  ___] : Dear  [unfilled], [Consult Letter:] : I had the pleasure of evaluating your patient, [unfilled]. [Please see my note below.] : Please see my note below. [Consult Closing:] : Thank you very much for allowing me to participate in the care of this patient.  If you have any questions, please do not hesitate to contact me. [Sincerely,] : Sincerely, [FreeTextEntry3] : Chan Augustin M.D.\par Neurology, Electromyography and Neuromuscular Medicine\par [facility]\par \par  of Neurology\par hospitals / [facility] [employer]

## 2022-07-12 NOTE — PHYSICAL EXAM
[FreeTextEntry1] : MMSE 19/30\par Right hand with thenar atrophy, thumb abduction is weak around 4-/5; left thumb abduction 4+/5 and remainder of UE strength 5/5\par Tinel's sign is present at right wrist

## 2022-07-12 NOTE — ASSESSMENT
[FreeTextEntry1] : There were significant deficits on mini mental testing today, however I suspect there is some component of depression that may be affecting her memory\par She was seeing a psychiatrist until that person left the practice, we will arrange for her to be seen in [facility]\par Hold off on memory aid medication for now \par She is not sure if she is taking citalopram - son will check her meds at home\par \par Right hand symptoms consistent with carpal tunnel- severe based on atrophy and weakness\par However she has no significant pain and is not interested in surgery, so continue wrist bracing at night for now\par \par f/u in 4 months

## 2022-07-12 NOTE — HISTORY OF PRESENT ILLNESS
[FreeTextEntry1] : Referred by Dr. Mckenzie for hand numbness and memory impairment\par She describes numbness in the right hand and first 3 fingers. She wears a glove at night which helps. \par She also feels that her memory is getting worse. Her son states she repeats questions a lot. \par She lives alone, used to have an aide but no longer. Her children come by and bring her food. \par She has not gotten lost (does not leave the house alone) and has not left the stove or oven on \par She admits to feeling depressed at times, which she relates to family issues \par \par Reviewed:\par Notes from cardiology, endocrinology\par Labs - A1C 5.6%, TSH 0.2 (2021) with normal T4

## 2022-08-10 ENCOUNTER — APPOINTMENT (OUTPATIENT)
Dept: HEART AND VASCULAR | Facility: CLINIC | Age: 87
End: 2022-08-10

## 2022-08-10 ENCOUNTER — NON-APPOINTMENT (OUTPATIENT)
Age: 87
End: 2022-08-10

## 2022-08-10 VITALS
BODY MASS INDEX: 24.35 KG/M2 | HEART RATE: 61 BPM | HEIGHT: 58 IN | WEIGHT: 116 LBS | TEMPERATURE: 97 F | DIASTOLIC BLOOD PRESSURE: 67 MMHG | SYSTOLIC BLOOD PRESSURE: 142 MMHG

## 2022-08-10 PROCEDURE — 99214 OFFICE O/P EST MOD 30 MIN: CPT | Mod: 25

## 2022-08-10 PROCEDURE — 93000 ELECTROCARDIOGRAM COMPLETE: CPT

## 2022-08-10 RX ORDER — ISOSORBIDE MONONITRATE 30 MG
30 TABLET, EXTENDED RELEASE 24 HR ORAL
Refills: 0 | Status: COMPLETED | COMMUNITY
End: 2022-08-10

## 2022-08-10 NOTE — HISTORY OF PRESENT ILLNESS
[FreeTextEntry1] : 85 y/o woman with hx of CABG, multiple PCIs, PVD, DM, CKD. CP stable since last visit.   No associated sob, dizziness or syncope. Walking distance about a block.

## 2022-08-10 NOTE — REVIEW OF SYSTEMS
[Dyspnea on exertion] : dyspnea during exertion [Chest Discomfort] : chest discomfort [Fever] : no fever [Headache] : no headache [Blurry Vision] : no blurred vision [Seeing Double (Diplopia)] : no diplopia [Earache] : no earache [Discharge From Ears] : no discharge from the ears [SOB] : no shortness of breath [Leg Claudication] : no intermittent leg claudication [Palpitations] : no palpitations [Syncope] : no syncope [Cough] : no cough [Wheezing] : no wheezing [Abdominal Pain] : no abdominal pain [Nausea] : no nausea [Change in Appetite] : no change in appetite [Dysuria] : no dysuria [Joint Pain] : no joint pain [Rash] : no rash [Dizziness] : no dizziness [Confusion] : no confusion was observed

## 2022-08-10 NOTE — DISCUSSION/SUMMARY
[FreeTextEntry1] : EKG: Sinus  Bradycardia 57/min, poor R-wave progression -nonspecific -consider old anterior infarct. Diffuse nonspecific T-abnormality. \par \par ECHO 3/22: normal LVEF, moderate TR, mild MR, calcified AV, no stenosis. LVEF improved compared to prior (30%)\par NST: EF 49%. Moderate sized scar in the anterior wall and apex with minimal joi-infarct ischemia. Anterior and apical akinesis. \par \par 1. CAD - c/w meds. NST shows minimal joi-infarct ischemia \par med Rx only for now - cont ISMO 30mg po daily\par 2. CKD - f/u with nephrology\par 3. DM - f/u with endo.\par 4. PVD -severe disease R>L, med rx for now\par 5. DELUCA - stable \par 6. HLD - c/w atorvastatin 40\par 6. RTC 3 months

## 2022-09-09 ENCOUNTER — APPOINTMENT (OUTPATIENT)
Dept: HEART AND VASCULAR | Facility: CLINIC | Age: 87
End: 2022-09-09

## 2022-09-09 VITALS
HEART RATE: 66 BPM | WEIGHT: 120 LBS | BODY MASS INDEX: 25.19 KG/M2 | SYSTOLIC BLOOD PRESSURE: 148 MMHG | TEMPERATURE: 97.7 F | HEIGHT: 58 IN | DIASTOLIC BLOOD PRESSURE: 74 MMHG

## 2022-09-09 DIAGNOSIS — R20.0 ANESTHESIA OF SKIN: ICD-10-CM

## 2022-09-09 PROCEDURE — ZZZZZ: CPT

## 2022-09-09 PROCEDURE — 93923 UPR/LXTR ART STDY 3+ LVLS: CPT

## 2022-09-09 PROCEDURE — 99214 OFFICE O/P EST MOD 30 MIN: CPT | Mod: 25

## 2022-10-20 ENCOUNTER — LABORATORY RESULT (OUTPATIENT)
Age: 87
End: 2022-10-20

## 2022-10-21 ENCOUNTER — LABORATORY RESULT (OUTPATIENT)
Age: 87
End: 2022-10-21

## 2022-10-24 ENCOUNTER — APPOINTMENT (OUTPATIENT)
Dept: NEPHROLOGY | Facility: CLINIC | Age: 87
End: 2022-10-24

## 2022-10-24 VITALS — SYSTOLIC BLOOD PRESSURE: 136 MMHG | DIASTOLIC BLOOD PRESSURE: 76 MMHG | HEART RATE: 68 BPM

## 2022-10-24 PROCEDURE — 99214 OFFICE O/P EST MOD 30 MIN: CPT

## 2022-10-24 NOTE — HISTORY OF PRESENT ILLNESS
[FreeTextEntry1] : 88 yo woman with HTN, proteinuria, CKD, CAD, DMT2, hyperlipidemia, LE edema here for follow up evaluation.\par mild dementia seeing gerontologist Laura Meléndez\par reports that she feels okay- not going out much- "no need"\par occ numbness right hand- worse at night-\par not using glucerna regularly few days a week- her oral intake is okay; daughter helping out with meals- but does not always like what she prepares\par No HAs or CP\par Denies flank pain, dysuria, hematuria or frothy urine \par no SOB, DELUCA\par \par \par \par \par PMH: \par Had colonoscopy 10/19/2018 + diverticular disease\par had urodynamics at [facility]- does not recall results\par  MRI brain possible "ministrokes" or early dementia\par Has 4 cardiac and 4 left leg and 3 right leg stents

## 2022-10-24 NOTE — PHYSICAL EXAM
[General Appearance - Alert] : alert [] : no respiratory distress [Auscultation Breath Sounds / Voice Sounds] : lungs were clear to auscultation bilaterally [Heart Sounds] : normal S1 and S2 [Heart Rate And Rhythm] : heart rate was normal and rhythm regular [Heart Sounds Gallop] : no gallops [Murmurs] : no murmurs [Heart Sounds Pericardial Friction Rub] : no pericardial rub [Cervical Lymph Nodes Enlarged Posterior Bilaterally] : posterior cervical [Cervical Lymph Nodes Enlarged Anterior Bilaterally] : anterior cervical [Supraclavicular Lymph Nodes Enlarged Bilaterally] : supraclavicular [Oriented To Time, Place, And Person] : oriented to person, place, and time [Impaired Insight] : insight and judgment were intact [Affect] : the affect was normal [FreeTextEntry1] : tr LE edema

## 2022-10-24 NOTE — ASSESSMENT
[FreeTextEntry1] : all lab data was reviewed with patient in detail from 10/24/2022\par 86 yo woman with CKD 3, HTN, LE edema, DM\par -CKD 3- creat 1.33- within her known ranges- (.97- 1.44) higher than last few determinations- repeat 1 month\par electrolytes good.\par -hypercalcemia- resolved Ca 10.1 \par -proteinuria- ACR 16- good-  c/w valsartan\par -hematuria-  resolved\par -HTN-  BP controlled- a bit on low side- if future readings < 130 will reduce, dc amlodipine- from EHR some BPs > 140 still\par to monitor closely- \par emphasized to pt and her daughter that if feel dizzy or lightheaded should hold amlodipine\par -HA- resolved after glaucoma improved\par - LE edema-  trace- acceptable today- limit salt- \par adjust furosemide as needed- keep present dosage \par \par \par f/u 9 months\par new labs 1 month to confirm all stable home draw)

## 2022-11-02 NOTE — ED ADULT NURSE NOTE - BREATHING, MLM
Spontaneous, unlabored and symmetrical Cheek-To-Nose Interpolation Flap Text: A decision was made to reconstruct the defect utilizing an interpolation axial flap and a staged reconstruction.  A telfa template was made of the defect.  This telfa template was then used to outline the Cheek-To-Nose Interpolation flap.  The donor area for the pedicle flap was then injected with anesthesia.  The flap was excised through the skin and subcutaneous tissue down to the layer of the underlying musculature.  The interpolation flap was carefully excised within this deep plane to maintain its blood supply.  The edges of the donor site were undermined.   The donor site was closed in a primary fashion.  The pedicle was then rotated into position and sutured.  Once the tube was sutured into place, adequate blood supply was confirmed with blanching and refill.  The pedicle was then wrapped with xeroform gauze and dressed appropriately with a telfa and gauze bandage to ensure continued blood supply and protect the attached pedicle.

## 2022-11-09 ENCOUNTER — APPOINTMENT (OUTPATIENT)
Dept: HEART AND VASCULAR | Facility: CLINIC | Age: 87
End: 2022-11-09

## 2022-11-09 VITALS — SYSTOLIC BLOOD PRESSURE: 121 MMHG | DIASTOLIC BLOOD PRESSURE: 64 MMHG

## 2022-11-09 VITALS
WEIGHT: 118 LBS | HEIGHT: 58 IN | SYSTOLIC BLOOD PRESSURE: 143 MMHG | HEART RATE: 62 BPM | DIASTOLIC BLOOD PRESSURE: 68 MMHG | TEMPERATURE: 97.8 F | OXYGEN SATURATION: 96 % | BODY MASS INDEX: 24.77 KG/M2

## 2022-11-09 PROCEDURE — 99214 OFFICE O/P EST MOD 30 MIN: CPT | Mod: 25

## 2022-11-09 PROCEDURE — 93000 ELECTROCARDIOGRAM COMPLETE: CPT

## 2022-11-09 RX ORDER — DIAZEPAM 5 MG/1
5 TABLET ORAL
Qty: 2 | Refills: 0 | Status: COMPLETED | COMMUNITY
Start: 2022-02-24 | End: 2022-11-09

## 2022-11-09 NOTE — END OF VISIT
[Time Spent: ___ minutes] : I have spent [unfilled] minutes of time on the encounter. Kidney stone on right side

## 2022-11-09 NOTE — REVIEW OF SYSTEMS
[Diarrhea] : diarrhea [SOB] : no shortness of breath [Dyspnea on exertion] : not dyspnea during exertion [Chest Discomfort] : no chest discomfort [Lower Ext Edema] : no extremity edema [Leg Claudication] : no intermittent leg claudication [Palpitations] : no palpitations [Syncope] : no syncope [Cough] : no cough [Abdominal Pain] : no abdominal pain [Nausea] : no nausea [Vomiting] : no vomiting [Heartburn] : no heartburn [Dizziness] : no dizziness [Numbness (Hypoesthesia)] : no numbness [Weakness] : no weakness [Speech Disturbance] : no speech disturbance [Easy Bleeding] : no tendency for easy bleeding

## 2022-11-09 NOTE — PHYSICAL EXAM
[Well Developed] : well developed [Well Nourished] : well nourished [No Acute Distress] : no acute distress [No Carotid Bruit] : no carotid bruit [Normal S1, S2] : normal S1, S2 [No Murmur] : no murmur [Clear Lung Fields] : clear lung fields [Good Air Entry] : good air entry [No Respiratory Distress] : no respiratory distress  [Moves all extremities] : moves all extremities [No Focal Deficits] : no focal deficits [Normal Speech] : normal speech [Alert and Oriented] : alert and oriented [Normal memory] : normal memory

## 2022-11-09 NOTE — DISCUSSION/SUMMARY
[FreeTextEntry1] : EKG: Normal Sinus  Rhythm 60/min, old anteroseptal infarct. Nonspecific T-abnormality. \par \par 1. CAD: Currently asymptomatic, NST show min joi- infarct ischemia. Continue with medical management, ASA, MEtoprolol, ISMO 30mg\par 2. CKD: follow up with nephrology\par 3. DM: follow up with endocrinology\par 4. PVD: Severe disease, continue follow up with vascular\par 5. HLD: LDL Goal < 70. Currently elevated at 124. Will switch to Rosuvastatin 40mg\par 6. Finger Numbness: Radial pulse 2+, good capillary refill, send to PCP \par 6. RTC 6 months

## 2022-11-09 NOTE — HISTORY OF PRESENT ILLNESS
[FreeTextEntry1] : 87 year old female, non smoker with PMHX of CAD sp CABG last and multi PCI's, DM, CKD and PVD here for follow up \par \par Since last visit, she has not been very active. When she does walk, she can walk up to 10 blocks with no chest pains or dyspnea on exertion. \par \par She denies any palpitations, dizziness, falls or syncope. \par \par She reports numbing sensation of first 3 digits of right hand with no other reported neuro focal deficits. She denies any skin color changes.

## 2022-11-11 ENCOUNTER — APPOINTMENT (OUTPATIENT)
Dept: ENDOCRINOLOGY | Facility: CLINIC | Age: 87
End: 2022-11-11

## 2022-11-11 LAB
ALBUMIN SERPL ELPH-MCNC: 4.4 G/DL
ALP BLD-CCNC: 97 U/L
ALT SERPL-CCNC: 10 U/L
ANION GAP SERPL CALC-SCNC: 9 MMOL/L
AST SERPL-CCNC: 18 U/L
BASOPHILS # BLD AUTO: 0.02 K/UL
BASOPHILS NFR BLD AUTO: 0.2 %
BILIRUB SERPL-MCNC: 0.4 MG/DL
BUN SERPL-MCNC: 22 MG/DL
CALCIUM SERPL-MCNC: 10.4 MG/DL
CHLORIDE SERPL-SCNC: 107 MMOL/L
CHOLEST SERPL-MCNC: 166 MG/DL
CO2 SERPL-SCNC: 25 MMOL/L
CREAT SERPL-MCNC: 1.21 MG/DL
EGFR: 44 ML/MIN/1.73M2
EOSINOPHIL # BLD AUTO: 0.2 K/UL
EOSINOPHIL NFR BLD AUTO: 2.2 %
ESTIMATED AVERAGE GLUCOSE: 131 MG/DL
GLUCOSE SERPL-MCNC: 100 MG/DL
HBA1C MFR BLD HPLC: 6.2 %
HCT VFR BLD CALC: 34.9 %
HDLC SERPL-MCNC: 55 MG/DL
HGB BLD-MCNC: 11 G/DL
IMM GRANULOCYTES NFR BLD AUTO: 0.2 %
LDLC SERPL CALC-MCNC: 89 MG/DL
LYMPHOCYTES # BLD AUTO: 2.13 K/UL
LYMPHOCYTES NFR BLD AUTO: 23.9 %
MAN DIFF?: NORMAL
MCHC RBC-ENTMCNC: 28.8 PG
MCHC RBC-ENTMCNC: 31.5 GM/DL
MCV RBC AUTO: 91.4 FL
MONOCYTES # BLD AUTO: 0.73 K/UL
MONOCYTES NFR BLD AUTO: 8.2 %
NEUTROPHILS # BLD AUTO: 5.82 K/UL
NEUTROPHILS NFR BLD AUTO: 65.3 %
NONHDLC SERPL-MCNC: 111 MG/DL
PLATELET # BLD AUTO: 230 K/UL
POTASSIUM SERPL-SCNC: 4.7 MMOL/L
PROT SERPL-MCNC: 7.6 G/DL
RBC # BLD: 3.82 M/UL
RBC # FLD: 13.9 %
SODIUM SERPL-SCNC: 142 MMOL/L
TRIGL SERPL-MCNC: 110 MG/DL
TSH SERPL-ACNC: 0.3 UIU/ML
WBC # FLD AUTO: 8.92 K/UL

## 2022-11-21 ENCOUNTER — APPOINTMENT (OUTPATIENT)
Dept: NEUROLOGY | Facility: CLINIC | Age: 87
End: 2022-11-21

## 2022-11-21 VITALS
SYSTOLIC BLOOD PRESSURE: 111 MMHG | HEART RATE: 63 BPM | DIASTOLIC BLOOD PRESSURE: 58 MMHG | HEIGHT: 58 IN | RESPIRATION RATE: 14 BRPM | BODY MASS INDEX: 24.35 KG/M2 | TEMPERATURE: 97.3 F | WEIGHT: 116 LBS | OXYGEN SATURATION: 97 %

## 2022-11-21 DIAGNOSIS — R41.3 OTHER AMNESIA: ICD-10-CM

## 2022-11-21 DIAGNOSIS — G56.01 CARPAL TUNNEL SYNDROME, RIGHT UPPER LIMB: ICD-10-CM

## 2022-11-21 PROCEDURE — 99213 OFFICE O/P EST LOW 20 MIN: CPT

## 2022-11-21 NOTE — PHYSICAL EXAM
[FreeTextEntry1] : Right hand with thenar atrophy, thumb abduction is weak around 4-/5; left thumb abduction 4+/5 and remainder of UE strength 5/5\par Tinel's sign is present at right wrist

## 2022-11-21 NOTE — HISTORY OF PRESENT ILLNESS
[FreeTextEntry1] : Feels memory is deteriorating - forgets a lot \par Feels neglected by family \par She saw [facility] - taking citalopram \par She still has hand numbness - uses gloves which help 
Yes

## 2022-11-21 NOTE — ASSESSMENT
[FreeTextEntry1] : Discussed starting donepezil however given number of meds she is on and cardiovascular risk factors benefits likely do not outweigh risks of medication\par Continue braces for carpal tunnel\par f/u in 6 months

## 2022-12-12 ENCOUNTER — APPOINTMENT (OUTPATIENT)
Dept: ENDOCRINOLOGY | Facility: CLINIC | Age: 87
End: 2022-12-12

## 2022-12-14 ENCOUNTER — APPOINTMENT (OUTPATIENT)
Dept: ENDOCRINOLOGY | Facility: CLINIC | Age: 87
End: 2022-12-14

## 2022-12-14 ENCOUNTER — APPOINTMENT (OUTPATIENT)
Dept: HEART AND VASCULAR | Facility: CLINIC | Age: 87
End: 2022-12-14

## 2022-12-14 VITALS
DIASTOLIC BLOOD PRESSURE: 72 MMHG | HEART RATE: 63 BPM | WEIGHT: 120 LBS | TEMPERATURE: 98.6 F | BODY MASS INDEX: 25.19 KG/M2 | SYSTOLIC BLOOD PRESSURE: 148 MMHG | HEIGHT: 58 IN

## 2022-12-14 VITALS
DIASTOLIC BLOOD PRESSURE: 85 MMHG | SYSTOLIC BLOOD PRESSURE: 176 MMHG | BODY MASS INDEX: 25.08 KG/M2 | HEART RATE: 62 BPM | WEIGHT: 120 LBS

## 2022-12-14 DIAGNOSIS — R79.89 OTHER SPECIFIED ABNORMAL FINDINGS OF BLOOD CHEMISTRY: ICD-10-CM

## 2022-12-14 DIAGNOSIS — I65.29 OCCLUSION AND STENOSIS OF UNSPECIFIED CAROTID ARTERY: ICD-10-CM

## 2022-12-14 PROCEDURE — 82962 GLUCOSE BLOOD TEST: CPT

## 2022-12-14 PROCEDURE — 93923 UPR/LXTR ART STDY 3+ LVLS: CPT

## 2022-12-14 PROCEDURE — 99214 OFFICE O/P EST MOD 30 MIN: CPT | Mod: 25

## 2022-12-14 PROCEDURE — 99213 OFFICE O/P EST LOW 20 MIN: CPT | Mod: 25

## 2022-12-14 NOTE — REASON FOR VISIT
[Follow - Up] : a follow-up visit [DM Type 2] : DM Type 2 [Thyroid nodule/ MNG] : thyroid nodule/ MNG [Osteoporosis] : osteoporosis

## 2022-12-16 NOTE — REVIEW OF SYSTEMS
[Recent Weight Gain (___ Lbs)] : recent weight gain: [unfilled] lbs [Fatigue] : fatigue [Negative] : Musculoskeletal [As Noted in HPI] : as noted in HPI [Pain/Numbness of Digits] : pain/numbness of digits [Dysphagia] : no dysphagia [Dysphonia] : no dysphonia [Chest Pain] : no chest pain [Difficulty Breathing] : no dyspnea [FreeTextEntry2] : She gained 4 lbs in 1 month.

## 2022-12-16 NOTE — RESULTS/DATA
[T-Score ___] : T-score: [unfilled] [FreeTextEntry2] : 06/24/16 [de-identified] : Spine T-score -1.6. [de-identified] : Left Hip (Total) T-score -1.2.  [de-identified] : Femoral neck T-score -2.2.  [de-identified] : Total Radius T-score -2.6. [FreeTextEntry1] : Impression: There is diminished bone density in the left hip, lumbar spine and in the segment one third of the way from the wrist to the elbow. Overall bone density in the L radius is in the osteoporotic range.

## 2022-12-16 NOTE — ASSESSMENT
[Carbohydrate Consistent Diet] : carbohydrate consistent diet [Importance of Diet and Exercise] : importance of diet and exercise to improve glycemic control, achieve weight loss and improve cardiovascular health [Self Monitoring of Blood Glucose] : self monitoring of blood glucose [FreeTextEntry1] : \par

## 2022-12-16 NOTE — END OF VISIT
[FreeTextEntry3] : All medical record entries made by the Scribe were at my, Dr. Denton Mckenzie, direction and personally dictated by me on 12/14/2022. I have reviewed the chart and agree that the record accurately reflects my personal performance of the history, physical exam, assessment and plan. I have also personally directed, reviewed and agreed with the chart.  [Time Spent: ___ minutes] : I have spent [unfilled] minutes of time on the encounter.

## 2022-12-16 NOTE — HISTORY OF PRESENT ILLNESS
[FreeTextEntry1] : 86 y/o F pt, with Hx of T2DM (dx in 2016), Hx of b/l thyroid nodules, and Hx of osteoporosis (BMD 2016). \par # T2DM (dx in 2016; currently diet controlled)\par DM related complications of peripheral neuropathy, CKD, CAD- cardiac stent in 2017\par # B/l Thyroid nodules with 1 negative FNA biopsy in 2017 (refer to 'Data Reviewed' for details)\par - Hx of intermittent low TSH levels. \par # Osteoporosis (BMD 2016 - might be on Fosamax as per pt; was on arthritis medication). \par Denies history of bone fractures. \par \par Other PMHx: Hypercholesterolemia, HTN\par SHx: Lives alone. Has an aid who works 4 hrs daily. Pt's children cooks for her.\par Partially edentulous. Uses walker due to trouble walking.  \par Sees Vascular Dr. Guerra and Dr. Gimenez at [facility]\par Last cardiologist, Dr. London Raymond, visit: 03/02/22.\par Pt participated in the [facility], and was discharged from it to f/u with her PCP/Geriatric Dr. Reyes. \par \par 12/14/2022\par Pt has POCT 106, /85 and BMI 25.08. She gained 4 lbs in 1 month. \par Today, pt is feeling "so-so" because she has more trouble with walking and feeling more fatigued. She also endorses numbness in her fingers. \par Denies CP, breathing difficulties, dysphagia, and dysphonia.\par  \par [Medications verified as per pt on 12/14/2022] \par Current Medications: "Fosamax" - might be taking for osteoporosis as per pt, Atorvastatin 80 mg, Plavix 75 mg, Vitamin D, Lasix (for eye), Amlodipine 5 mg, Folic acid 1 mg, Pantoprazole 20 mg, Valsartan 160 mg, Metoprolol ER 50 mg qd, Citalopram 10 mg, ASA ,Vit E\par

## 2022-12-16 NOTE — ADDENDUM
[FreeTextEntry1] : I Jessicagabriel Martinez act soley as a scribe for Dr. Denton Mckenzie on this date. 12/14/2022

## 2022-12-16 NOTE — PHYSICAL EXAM
[Alert] : alert [Normal Sclera/Conjunctiva] : normal sclera/conjunctiva [Normal Outer Ear/Nose] : the ears and nose were normal in appearance [Clear to Auscultation] : lungs were clear to auscultation bilaterally [Normal Rate] : heart rate was normal [Regular Rhythm] : with a regular rhythm [No Edema] : no peripheral edema [Normal Bowel Sounds] : normal bowel sounds [Kyphosis] : kyphosis present [No Stigmata of Cushings Syndrome] : no stigmata of Cushings Syndrome [Cranial Nerves Intact] : cranial nerves 2-12 were intact [Oriented x3] : oriented to person, place, and time [de-identified] : Small nodularities palpated b/l.  [de-identified] : No edema in LE. [de-identified] : Uses walker to ambulate

## 2022-12-16 NOTE — DATA REVIEWED
[FreeTextEntry1] : Scanned Labs:\par - 03/04/22: A1c 6.2% (H), Free T4 0.95, Free T3 2.39, LDL-c 119.20, s.creat 1.05\par - 02/08/19: s.creat 1.15, Cholesterol 212, LDL-c 138, Vit D 25-OH 25.1, Vit B12 753\par \par Scanned Imaging:\par - 04/24/17 FNA L upper pole 1.0 cm nodule: [place] II (pt also had an FNA biopsy on 1/28/16)\par - 03/02/17 Thyroid US: R lobe contains 3 nodules. Nodule 1 - midportion 1.1 x 0.8 x 0.8 cm spongiform. Nodule 2 - midportion 0.6 x 0.4 x 0.7 cm spongiform. Nodule 3 - lower pole 0.5 x 0.3 x 0.4 cm spongiform. L lobe contains 2 nodules. Nodule 1 - superior to midportion 1.0 x 0.6 x 1.0 cm hypoechoic solid with cystic components (FNA recommended). Nodule 2 - mid to lower portion 2.1 x 1.2 x 1.4 cm spongiform. Isthmus contains one midportion 0.5 x 0.3 x 0.6 cm spongiform nodule. \par - 06/24/16 BMD: Left Hip (Total) T-score -1.2. Femoral neck T-score -2.2. Spine T-score -1.6. Total Radius T-score -2.6. Radius segment 1/3 from the wrist to the elbow T-score -2.2. Impression: There is diminished bone density in the left hip, lumbar spine and in the segment one third of the way from the wrist to the elbow. Overall bone density in the L radius is in the osteoporotic range. \par

## 2022-12-19 ENCOUNTER — APPOINTMENT (OUTPATIENT)
Dept: ENDOCRINOLOGY | Facility: CLINIC | Age: 87
End: 2022-12-19

## 2023-01-21 LAB — GLUCOSE BLDC GLUCOMTR-MCNC: 106

## 2023-02-15 ENCOUNTER — APPOINTMENT (OUTPATIENT)
Dept: HEART AND VASCULAR | Facility: CLINIC | Age: 88
End: 2023-02-15
Payer: MEDICARE

## 2023-02-15 VITALS
SYSTOLIC BLOOD PRESSURE: 108 MMHG | OXYGEN SATURATION: 96 % | HEART RATE: 62 BPM | WEIGHT: 117 LBS | DIASTOLIC BLOOD PRESSURE: 62 MMHG | TEMPERATURE: 97.8 F | HEIGHT: 58 IN | BODY MASS INDEX: 24.56 KG/M2

## 2023-02-15 DIAGNOSIS — R07.9 CHEST PAIN, UNSPECIFIED: ICD-10-CM

## 2023-02-15 PROCEDURE — 99214 OFFICE O/P EST MOD 30 MIN: CPT | Mod: 25

## 2023-02-15 PROCEDURE — 93000 ELECTROCARDIOGRAM COMPLETE: CPT

## 2023-02-15 RX ORDER — CLOPIDOGREL BISULFATE 75 MG/1
75 TABLET, FILM COATED ORAL DAILY
Qty: 90 | Refills: 3 | Status: ACTIVE | COMMUNITY
Start: 2017-08-08 | End: 1900-01-01

## 2023-02-15 RX ORDER — ATORVASTATIN CALCIUM 80 MG/1
80 TABLET, FILM COATED ORAL
Refills: 0 | Status: COMPLETED | COMMUNITY
End: 2023-02-15

## 2023-02-15 RX ORDER — VALSARTAN 160 MG/1
160 TABLET, COATED ORAL
Refills: 0 | Status: COMPLETED | COMMUNITY
End: 2023-02-15

## 2023-02-15 NOTE — HISTORY OF PRESENT ILLNESS
[FreeTextEntry1] : 87 year old female, non smoker with PMHX of CAD sp CABG last and multi PCI's, DM, CKD and PVD here for follow up. Recent admission to [facility] b/o CP - positive stress test - med Rx only.\par Pt mostly stays at home\par \par She denies any palpitations, dizziness, falls or syncope. \par

## 2023-02-15 NOTE — DISCUSSION/SUMMARY
[FreeTextEntry1] : EKG: Normal Sinus  Rhythm 62/min, old anteroseptal infarct. Nonspecific T-abnormality.\par \par 1. CAD: Currently no CP, Continue with medical management, ASA, MEtoprolol, ISMO 30mg\par 2. CKD: follow up with nephrology\par 3. DM: follow up with endocrinology\par 4. PVD: Severe disease, continue follow up with vascular\par 5. HLD: LDL Goal < 70. Currently elevated at 124. Rosuvastatin 40mg\par 6. Finger Numbness: Radial pulse 2+, good capillary refill, send to PCP \par 6. RTC 6 months

## 2023-02-16 LAB
ALBUMIN SERPL ELPH-MCNC: 4.3 G/DL
ALP BLD-CCNC: 77 U/L
ALT SERPL-CCNC: 12 U/L
ANION GAP SERPL CALC-SCNC: 14 MMOL/L
AST SERPL-CCNC: 20 U/L
BASOPHILS # BLD AUTO: 0.04 K/UL
BASOPHILS NFR BLD AUTO: 0.5 %
BILIRUB SERPL-MCNC: 0.2 MG/DL
BUN SERPL-MCNC: 27 MG/DL
CALCIUM SERPL-MCNC: 10.6 MG/DL
CHLORIDE SERPL-SCNC: 107 MMOL/L
CHOLEST SERPL-MCNC: 171 MG/DL
CO2 SERPL-SCNC: 20 MMOL/L
CREAT SERPL-MCNC: 1.73 MG/DL
EGFR: 28 ML/MIN/1.73M2
EOSINOPHIL # BLD AUTO: 0.14 K/UL
EOSINOPHIL NFR BLD AUTO: 1.9 %
ESTIMATED AVERAGE GLUCOSE: 134 MG/DL
GLUCOSE SERPL-MCNC: 91 MG/DL
HBA1C MFR BLD HPLC: 6.3 %
HCT VFR BLD CALC: 33.8 %
HDLC SERPL-MCNC: 61 MG/DL
HGB BLD-MCNC: 10.7 G/DL
IMM GRANULOCYTES NFR BLD AUTO: 0.4 %
LDLC SERPL CALC-MCNC: 96 MG/DL
LYMPHOCYTES # BLD AUTO: 1.65 K/UL
LYMPHOCYTES NFR BLD AUTO: 22.2 %
MAN DIFF?: NORMAL
MCHC RBC-ENTMCNC: 28.6 PG
MCHC RBC-ENTMCNC: 31.7 GM/DL
MCV RBC AUTO: 90.4 FL
MONOCYTES # BLD AUTO: 0.56 K/UL
MONOCYTES NFR BLD AUTO: 7.5 %
NEUTROPHILS # BLD AUTO: 5 K/UL
NEUTROPHILS NFR BLD AUTO: 67.5 %
NONHDLC SERPL-MCNC: 110 MG/DL
PLATELET # BLD AUTO: 235 K/UL
POTASSIUM SERPL-SCNC: 4.2 MMOL/L
PROT SERPL-MCNC: 7.3 G/DL
RBC # BLD: 3.74 M/UL
RBC # FLD: 14.3 %
SODIUM SERPL-SCNC: 140 MMOL/L
TRIGL SERPL-MCNC: 69 MG/DL
WBC # FLD AUTO: 7.42 K/UL

## 2023-02-27 ENCOUNTER — APPOINTMENT (OUTPATIENT)
Dept: ENDOCRINOLOGY | Facility: CLINIC | Age: 88
End: 2023-02-27
Payer: MEDICARE

## 2023-02-27 VITALS
SYSTOLIC BLOOD PRESSURE: 123 MMHG | WEIGHT: 118 LBS | HEART RATE: 63 BPM | BODY MASS INDEX: 24.66 KG/M2 | DIASTOLIC BLOOD PRESSURE: 68 MMHG

## 2023-02-27 LAB — GLUCOSE BLDC GLUCOMTR-MCNC: 91

## 2023-02-27 PROCEDURE — 82962 GLUCOSE BLOOD TEST: CPT

## 2023-02-27 PROCEDURE — 99214 OFFICE O/P EST MOD 30 MIN: CPT | Mod: 25

## 2023-02-27 NOTE — REASON FOR VISIT
[Follow - Up] : a follow-up visit [DM Type 2] : DM Type 2 [Osteoporosis] : osteoporosis [Thyroid nodule/ MNG] : thyroid nodule/ MNG

## 2023-03-02 NOTE — ADDENDUM
[FreeTextEntry1] : I Jessicagabriel Martinez act soley as a scribe for Dr. Denton Mckenzie on this date. 02/27/2023

## 2023-03-02 NOTE — REVIEW OF SYSTEMS
[Pain/Numbness of Digits] : pain/numbness of digits [As Noted in HPI] : as noted in HPI [Negative] : Constitutional [Recent Weight Gain (___ Lbs)] : no recent weight gain [Recent Weight Loss (___ Lbs)] : no recent weight loss [Dysphagia] : no dysphagia [Chest Pain] : no chest pain [Dysphonia] : no dysphonia [Difficulty Breathing] : no dyspnea [de-identified] : Memory loss

## 2023-03-02 NOTE — PHYSICAL EXAM
[Alert] : alert [Normal Sclera/Conjunctiva] : normal sclera/conjunctiva [Normal Outer Ear/Nose] : the ears and nose were normal in appearance [Clear to Auscultation] : lungs were clear to auscultation bilaterally [Normal Rate] : heart rate was normal [Regular Rhythm] : with a regular rhythm [No Edema] : no peripheral edema [Normal Bowel Sounds] : normal bowel sounds [Kyphosis] : kyphosis present [No Stigmata of Cushings Syndrome] : no stigmata of Cushings Syndrome [Cranial Nerves Intact] : cranial nerves 2-12 were intact [Oriented x3] : oriented to person, place, and time [de-identified] : Small nodularities palpated b/l.  [de-identified] : No edema in LE. [de-identified] : Uses walker to ambulate

## 2023-03-02 NOTE — END OF VISIT
[FreeTextEntry3] : All medical record entries made by the Scribe were at my, Dr. Denton Mckenzie, direction and personally dictated by me on 02/27/2023. I have reviewed the chart and agree that the record accurately reflects my personal performance of the history, physical exam, assessment and plan. I have also personally directed, reviewed and agreed with the chart.  [Time Spent: ___ minutes] : I have spent [unfilled] minutes of time on the encounter.

## 2023-03-02 NOTE — DATA REVIEWED
[FreeTextEntry1] : Scanned Labs:\par - 03/04/22: A1c 6.2% (H), Free T4 0.95, Free T3 2.39, LDL-c 119.20, s.creat 1.05\par - 02/08/19: s.creat 1.15, Cholesterol 212, LDL-c 138, Vit D 25-OH 25.1, Vit B12 753\par \par Scanned Imaging:\par - 04/24/17 FNA L upper pole 1.0 cm nodule: [place] II (pt also had an FNA biopsy on 1/28/16)\par - 03/02/17 Thyroid US: R lobe contains 3 nodules. Nodule 1 - midportion 1.1 x 0.8 x 0.8 cm spongiform. Nodule 2 - midportion 0.6 x 0.4 x 0.7 cm spongiform. Nodule 3 - lower pole 0.5 x 0.3 x 0.4 cm spongiform. L lobe contains 2 nodules. Nodule 1 - superior to midportion 1.0 x 0.6 x 1.0 cm hypoechoic solid with cystic components (FNA recommended). Nodule 2 - mid to lower portion 2.1 x 1.2 x 1.4 cm spongiform. Isthmus contains one midportion 0.5 x 0.3 x 0.6 cm spongiform nodule. \par - 06/24/16 BMD: Left Hip (Total) T-score -1.2. Femoral neck T-score -2.2. Spine T-score -1.6. Total Radius T-score -2.6. Radius segment 1/3 from the wrist to the elbow T-score -2.2. Impression: There is diminished bone density in the left hip, lumbar spine and in the segment one third of the way from the wrist to the elbow. Overall bone density in the L radius is in the osteoporotic range. \par

## 2023-03-02 NOTE — HISTORY OF PRESENT ILLNESS
[FreeTextEntry1] : 88 y/o F pt, with Hx of T2DM (dx in 2016), Hx of b/l thyroid nodules, and Hx of osteoporosis (BMD 2016). \par # T2DM (dx in 2016; currently diet controlled)\par DM related complications of peripheral neuropathy, CKD, CAD- cardiac stent in 2017\par \par # B/l Thyroid nodules with 1 negative FNA biopsy in 2017 (refer to 'Data Reviewed' for details)\par - Hx of intermittent low TSH levels. \par \par # Osteoporosis (BMD 2016 - might be on Fosamax as per pt; was on arthritis medication). \par Kyphosis. Denies history of bone fractures. \par Partially edentulous. Uses walker due to trouble walking.  \par \par Other PMHx: Hypercholesterolemia, HTN\par SHx: Lives alone. Has an aid who works 4 hrs daily. Pt's children cooks for her.\par Sees Vascular Dr. Guerra and Dr. Gimenez at [facility]\par Last cardiologist, Dr. London Raymond, visit: 03/02/22.\par Pt participated in the [facility], and was discharged from it to f/u with her PCP/Geriatric Dr. Reyes. \par Dr. Gabi De Los Santos fax [phone]\par \par 02/27/2023\par Pt has POCT 91, /68 and BMI 24.66. No significant weight changes since 02/15/23. She has not completed thyroid US or BMD. \par Today, pt is feeling okay, with c/o difficulty remembering. She remembers little about her DM, osteoporosis, and thyroid nodules. She see a geriatric specialist at [place]. \par   \par Current Medications: "Fosamax" - might be taking for osteoporosis as per pt, Atorvastatin 80 mg, Plavix 75 mg, Vitamin D, Lasix (for eye), Amlodipine 5 mg, Folic acid 1 mg, Pantoprazole 20 mg, Valsartan 160 mg, Metoprolol ER 50 mg qd, Citalopram 10 mg, ASA ,Vit E\par

## 2023-03-02 NOTE — RESULTS/DATA
[T-Score ___] : T-score: [unfilled] [FreeTextEntry2] : 06/24/16 [de-identified] : Spine T-score -1.6. [de-identified] : Left Hip (Total) T-score -1.2.  [de-identified] : Femoral neck T-score -2.2.  [de-identified] : Total Radius T-score -2.6. [FreeTextEntry1] : Impression: There is diminished bone density in the left hip, lumbar spine and in the segment one third of the way from the wrist to the elbow. Overall bone density in the L radius is in the osteoporotic range.

## 2023-03-13 ENCOUNTER — LABORATORY RESULT (OUTPATIENT)
Age: 88
End: 2023-03-13

## 2023-03-13 ENCOUNTER — APPOINTMENT (OUTPATIENT)
Dept: NEUROLOGY | Facility: CLINIC | Age: 88
End: 2023-03-13

## 2023-03-14 ENCOUNTER — LABORATORY RESULT (OUTPATIENT)
Age: 88
End: 2023-03-14

## 2023-03-15 ENCOUNTER — APPOINTMENT (OUTPATIENT)
Dept: NEPHROLOGY | Facility: CLINIC | Age: 88
End: 2023-03-15
Payer: MEDICARE

## 2023-03-15 DIAGNOSIS — F41.9 ANXIETY DISORDER, UNSPECIFIED: ICD-10-CM

## 2023-03-15 PROCEDURE — 99214 OFFICE O/P EST MOD 30 MIN: CPT

## 2023-03-16 VITALS — HEART RATE: 62 BPM | SYSTOLIC BLOOD PRESSURE: 120 MMHG | DIASTOLIC BLOOD PRESSURE: 66 MMHG

## 2023-03-16 NOTE — PHYSICAL EXAM
[General Appearance - Alert] : alert [] : no respiratory distress [Auscultation Breath Sounds / Voice Sounds] : lungs were clear to auscultation bilaterally [Heart Rate And Rhythm] : heart rate was normal and rhythm regular [Heart Sounds] : normal S1 and S2 [Heart Sounds Gallop] : no gallops [Murmurs] : no murmurs [Heart Sounds Pericardial Friction Rub] : no pericardial rub [Oriented To Time, Place, And Person] : oriented to person, place, and time [Impaired Insight] : insight and judgment were intact [Affect] : the affect was normal [FreeTextEntry1] : tr LE edema

## 2023-03-16 NOTE — HISTORY OF PRESENT ILLNESS
[FreeTextEntry1] : 88 yo woman here for f/u evaluation of HTN, proteinuria, CKD, CAD, DMT2, hyperlipidemia, LE edema.\par reports that she was in [facility] for about 1 week- begin Feb for chest pain- "had a heart attack" \par son with her today- can do ADLs but someone always with her- mild dementia seeing gerontologist [facility]\par generally says she feels okay- still not venturing out much\par eating a bit better- family helping with food choices\par No HAs or CP\par Denies flank pain, dysuria, hematuria or frothy urine \par no SOB, DELUCA\par \par \par \par \par PMH: \par Had colonoscopy 10/19/2018 + diverticular disease\par had urodynamics at [facility]- does not recall results\par  MRI brain possible "ministrokes" or early dementia\par Has 4 cardiac and 4 left leg and 3 right leg stents

## 2023-04-12 ENCOUNTER — APPOINTMENT (OUTPATIENT)
Dept: HEART AND VASCULAR | Facility: CLINIC | Age: 88
End: 2023-04-12
Payer: MEDICARE

## 2023-04-12 VITALS
OXYGEN SATURATION: 97 % | HEIGHT: 58 IN | DIASTOLIC BLOOD PRESSURE: 64 MMHG | HEART RATE: 57 BPM | TEMPERATURE: 98.6 F | SYSTOLIC BLOOD PRESSURE: 116 MMHG | BODY MASS INDEX: 24.35 KG/M2 | WEIGHT: 116 LBS

## 2023-04-12 DIAGNOSIS — I25.2 OLD MYOCARDIAL INFARCTION: ICD-10-CM

## 2023-04-12 DIAGNOSIS — R26.89 OTHER ABNORMALITIES OF GAIT AND MOBILITY: ICD-10-CM

## 2023-04-12 PROCEDURE — 93923 UPR/LXTR ART STDY 3+ LVLS: CPT

## 2023-04-12 PROCEDURE — 99214 OFFICE O/P EST MOD 30 MIN: CPT | Mod: 25

## 2023-04-18 ENCOUNTER — RX RENEWAL (OUTPATIENT)
Age: 88
End: 2023-04-18

## 2023-04-18 RX ORDER — ISOSORBIDE MONONITRATE 30 MG/1
30 TABLET, EXTENDED RELEASE ORAL DAILY
Qty: 90 | Refills: 3 | Status: ACTIVE | COMMUNITY
Start: 2022-03-23 | End: 1900-01-01

## 2023-04-25 ENCOUNTER — APPOINTMENT (OUTPATIENT)
Dept: ENDOCRINOLOGY | Facility: CLINIC | Age: 88
End: 2023-04-25
Payer: MEDICARE

## 2023-04-25 VITALS
BODY MASS INDEX: 24.45 KG/M2 | DIASTOLIC BLOOD PRESSURE: 63 MMHG | SYSTOLIC BLOOD PRESSURE: 127 MMHG | HEART RATE: 74 BPM | WEIGHT: 117 LBS

## 2023-04-25 PROCEDURE — 99213 OFFICE O/P EST LOW 20 MIN: CPT

## 2023-04-25 PROCEDURE — 82962 GLUCOSE BLOOD TEST: CPT

## 2023-04-26 NOTE — DATA REVIEWED
[FreeTextEntry1] : Scanned Labs:\par - 03/04/22: A1c 6.2% (H), Free T4 0.95, Free T3 2.39, LDL-c 119.20, s.creat 1.05\par - 02/08/19: s.creat 1.15, Cholesterol 212, LDL-c 138, Vit D 25-OH 25.1, Vit B12 753\par \par Scanned Imaging:\par - 04/24/17 FNA L upper pole 1.0 cm nodule: [place] II (pt also had an FNA biopsy on 1/28/16)\par - 03/02/17 Thyroid US: R lobe contains 3 nodules. R Nodule 1 - midportion 1.1 x 0.8 x 0.8 cm spongiform. Nodule 2 - midportion 0.6 x 0.4 x 0.7 cm spongiform. Nodule 3 - lower pole 0.5 x 0.3 x 0.4 cm spongiform. L lobe contains 2 nodules. Nodule 1 - superior to midportion 1.0 x 0.6 x 1.0 cm hypoechoic solid with cystic components (FNA recommended). Nodule 2 - mid to lower portion 2.1 x 1.2 x 1.4 cm spongiform. Isthmus contains one midportion 0.5 x 0.3 x 0.6 cm spongiform nodule. \par - 06/24/16 BMD: Left Hip (Total) T-score -1.2. Femoral neck T-score -2.2. Spine T-score -1.6. Total Radius T-score -2.6. Radius segment 1/3 from the wrist to the elbow T-score -2.2. Impression: There is diminished bone density in the left hip, lumbar spine and in the segment one third of the way from the wrist to the elbow. Overall bone density in the L radius is in the osteoporotic range. \par

## 2023-04-26 NOTE — RESULTS/DATA
[T-Score ___] : T-score: [unfilled] [FreeTextEntry2] : 06/24/16 [de-identified] : Spine T-score -1.6. [de-identified] : Left Hip (Total) T-score -1.2.  [de-identified] : Femoral neck T-score -2.2.  [de-identified] : Total Radius T-score -2.6. [FreeTextEntry1] : Impression: There is diminished bone density in the left hip, lumbar spine and in the segment one third of the way from the wrist to the elbow. Overall bone density in the L radius is in the osteoporotic range.

## 2023-04-26 NOTE — PHYSICAL EXAM
[Alert] : alert [Normal Sclera/Conjunctiva] : normal sclera/conjunctiva [Normal Outer Ear/Nose] : the ears and nose were normal in appearance [Clear to Auscultation] : lungs were clear to auscultation bilaterally [Normal Rate] : heart rate was normal [Regular Rhythm] : with a regular rhythm [No Edema] : no peripheral edema [Normal Bowel Sounds] : normal bowel sounds [Kyphosis] : kyphosis present [No Stigmata of Cushings Syndrome] : no stigmata of Cushings Syndrome [Cranial Nerves Intact] : cranial nerves 2-12 were intact [Oriented x3] : oriented to person, place, and time [de-identified] : Small nodularities palpated b/l.  [de-identified] : No edema in LE. [de-identified] : Uses walker to ambulate

## 2023-04-26 NOTE — ADDENDUM
[FreeTextEntry1] : I, Yumiko Crawley, acted solely as a scribe for Dr. Denton Mckenzie on this date 04/25/2023

## 2023-04-26 NOTE — REVIEW OF SYSTEMS
[Pain/Numbness of Digits] : pain/numbness of digits [As Noted in HPI] : as noted in HPI [Negative] : Heme/Lymph [Recent Weight Gain (___ Lbs)] : no recent weight gain [Recent Weight Loss (___ Lbs)] : no recent weight loss [Dysphagia] : no dysphagia [Dysphonia] : no dysphonia [Chest Pain] : no chest pain [Difficulty Breathing] : no dyspnea [de-identified] : Memory loss

## 2023-04-26 NOTE — HISTORY OF PRESENT ILLNESS
[FreeTextEntry1] : 88 y/o F pt, with Hx of T2DM (dx in 2016), Hx of b/l thyroid nodules, and Hx of osteoporosis (BMD 2016). \par # T2DM (dx in 2016; currently diet controlled)\par DM related complications of peripheral neuropathy, CKD, CAD- cardiac stent in 2017\par \par # B/l Thyroid nodules with 1 negative FNA biopsy in 2017 (refer to 'Data Reviewed' for details)\par - Hx of intermittent low TSH levels. \par \par # Osteoporosis (BMD 2016 - might be on Fosamax as per pt; was on arthritis medication). \par Kyphosis. Denies history of bone fractures. \par Partially edentulous. Uses walker due to trouble walking.  \par \par Other PMHx: Hypercholesterolemia, HTN, MI in 2003, Multiple PCI, CABG. \par SHx: Lives alone. Has an aid who works 4 hrs daily. Pt's children cooks for her.\par Sees Vascular Dr. Guerra and Dr. Gimenez at [facility]\par Last cardiologist, Dr. London Raymond, visit: 03/02/22.\par Pt participated in the [facility], and was discharged from it to f/u with her PCP/Geriatric Dr. Reyes. \par Dr. Gabi De Los Santos fax [phone]\par \par 02/27/2023\par Pt has POCT 91, /68 and BMI 24.66. No significant weight changes since 02/15/23. She has not completed thyroid US or BMD. \par Today, pt is feeling okay, with c/o difficulty remembering. She remembers little about her DM, osteoporosis, and thyroid nodules. She see a geriatric specialist at [place]. \par \par Review of pt's chart:\par - Cardiologist note from 03/22: Pt with hx of CABG, Multiple PCI, PVD. hx of MI 2003.  \par - Note from 09/29/15: Hx of DM: Unable to take Metformin. \par - Pt's weight in 2015: 128, Today, her weight is 117. \par - Thyroid US done in 2017: R lobe 3 small nodules. L lobe 2 nodules: One in mid portion 2.1 cm, spongiform. One nodule in the Isthmus. Biopsy was in 2017: FNA L upper 1 cm nodule was biopsied.    \par \par 04/25/2023\par CC: " I am not doing well.  Everything is wrong with me." She is not taking medications for DM. Pt has history of Fosamax but is unsure if she is still taking it. She does not know what medications she is taking. \par    \par Current Medications: "Fosamax" - might be taking for osteoporosis as per pt, Atorvastatin 80 mg, Plavix 75 mg, Vitamin D, Lasix (for eye), Amlodipine 5 mg, Folic acid 1 mg, Pantoprazole 20 mg, Valsartan 160 mg, Metoprolol ER 50 mg qd, Citalopram 10 mg, ASA ,Vit E

## 2023-04-26 NOTE — END OF VISIT
[FreeTextEntry3] : All medical record entries made by the Scribe were at my, Dr. Denton Mckenzie, direction and personally dictated by me on 04/25/2023. I have reviewed the chart and agree that the record accurately reflects my personal performance of the history, physical exam, assessment and plan. I have also personally, directed, reviewed and agreed with the chart  [Time Spent: ___ minutes] : I have spent [unfilled] minutes of time on the encounter.

## 2023-05-10 ENCOUNTER — NON-APPOINTMENT (OUTPATIENT)
Age: 88
End: 2023-05-10

## 2023-05-10 ENCOUNTER — APPOINTMENT (OUTPATIENT)
Dept: HEART AND VASCULAR | Facility: CLINIC | Age: 88
End: 2023-05-10
Payer: MEDICARE

## 2023-05-10 VITALS
HEART RATE: 65 BPM | TEMPERATURE: 97.3 F | BODY MASS INDEX: 24.14 KG/M2 | SYSTOLIC BLOOD PRESSURE: 133 MMHG | OXYGEN SATURATION: 97 % | WEIGHT: 115 LBS | DIASTOLIC BLOOD PRESSURE: 66 MMHG | HEIGHT: 58 IN

## 2023-05-10 PROCEDURE — 99214 OFFICE O/P EST MOD 30 MIN: CPT | Mod: 25

## 2023-05-10 PROCEDURE — 93000 ELECTROCARDIOGRAM COMPLETE: CPT

## 2023-05-10 NOTE — DISCUSSION/SUMMARY
[FreeTextEntry1] : EKG: Sinus  Bradycardia 58/min, left atrial enlargement. Anterior infarct -age undetermined. \par \par 1. CAD: stable - no CP, Continue with medical management, ASA, metoprolol, ISMO 30mg\par 2. CKD: follow up with nephrology\par 3. DM: follow up with endocrinology\par 4. PVD: Severe disease, continue follow up with vascular\par 5. HLD: LDL Goal < 70. Currently elevated at 124. Rosuvastatin 40mg\par 6. RTC 6 months

## 2023-05-10 NOTE — HISTORY OF PRESENT ILLNESS
[FreeTextEntry1] : 87 year old female, non smoker with PMHX of CAD sp CABG last and multi PCI's, DM, CKD and PVD here for follow up. Recent admission to [facility] b/o CP - positive stress test - med Rx only.\par Pt mostly stays at home\par \par She denies any palpitations, dizziness, falls or syncope. \par

## 2023-05-31 NOTE — PATIENT PROFILE ADULT. - NS PRO TALK SOMEONE YN
Decrease Metoprolol Succinate to 25 mg tablet daily    Decrease Atorvastatin to 20 mg daily    Continue other medications    Call [phone] ([phone]) to schedule   -call to schedule your echo
no

## 2023-06-26 LAB
CREAT SPEC-SCNC: 124 MG/DL
GLUCOSE BLDC GLUCOMTR-MCNC: 134
MICROALBUMIN 24H UR DL<=1MG/L-MCNC: 17.1 MG/DL
MICROALBUMIN/CREAT 24H UR-RTO: 138 MG/G

## 2023-07-12 ENCOUNTER — APPOINTMENT (OUTPATIENT)
Dept: NEPHROLOGY | Facility: CLINIC | Age: 88
End: 2023-07-12
Payer: MEDICARE

## 2023-07-12 ENCOUNTER — APPOINTMENT (OUTPATIENT)
Dept: NEPHROLOGY | Facility: CLINIC | Age: 88
End: 2023-07-12

## 2023-07-12 VITALS — HEART RATE: 61 BPM | SYSTOLIC BLOOD PRESSURE: 132 MMHG | DIASTOLIC BLOOD PRESSURE: 56 MMHG

## 2023-07-12 DIAGNOSIS — I25.10 ATHEROSCLEROTIC HEART DISEASE OF NATIVE CORONARY ARTERY W/OUT ANGINA PECTORIS: ICD-10-CM

## 2023-07-12 DIAGNOSIS — E87.5 HYPERKALEMIA: ICD-10-CM

## 2023-07-12 DIAGNOSIS — N18.30 CHRONIC KIDNEY DISEASE, STAGE 3 UNSPECIFIED: ICD-10-CM

## 2023-07-12 DIAGNOSIS — N18.2 CHRONIC KIDNEY DISEASE, STAGE 2 (MILD): ICD-10-CM

## 2023-07-12 DIAGNOSIS — R60.0 LOCALIZED EDEMA: ICD-10-CM

## 2023-07-12 PROCEDURE — 99214 OFFICE O/P EST MOD 30 MIN: CPT

## 2023-07-13 DIAGNOSIS — N17.9 ACUTE KIDNEY FAILURE, UNSPECIFIED: ICD-10-CM

## 2023-07-13 DIAGNOSIS — R82.90 UNSPECIFIED ABNORMAL FINDINGS IN URINE: ICD-10-CM

## 2023-07-13 LAB
ALBUMIN SERPL ELPH-MCNC: 4.4 G/DL
ANION GAP SERPL CALC-SCNC: 12 MMOL/L
APPEARANCE: ABNORMAL
BACTERIA: ABNORMAL /HPF
BILIRUBIN URINE: NEGATIVE
BLOOD URINE: ABNORMAL
BUN SERPL-MCNC: 26 MG/DL
CALCIUM SERPL-MCNC: 10.4 MG/DL
CALCIUM SERPL-MCNC: 10.4 MG/DL
CAST: 62 /LPF
CHLORIDE SERPL-SCNC: 103 MMOL/L
CO2 SERPL-SCNC: 22 MMOL/L
COLOR: YELLOW
CREAT SERPL-MCNC: 2.47 MG/DL
CREAT SPEC-SCNC: 181 MG/DL
CREAT/PROT UR: 1.1 RATIO
EGFR: 19 ML/MIN/1.73M2
EPITHELIAL CELLS: 10 /HPF
FINE GRANULAR CASTS: PRESENT
GLUCOSE QUALITATIVE U: NEGATIVE MG/DL
GLUCOSE SERPL-MCNC: 93 MG/DL
HYALINE CASTS: PRESENT
KETONES URINE: ABNORMAL MG/DL
LEUKOCYTE ESTERASE URINE: ABNORMAL
MAGNESIUM SERPL-MCNC: 2.5 MG/DL
MICROSCOPIC-UA: NORMAL
NITRITE URINE: POSITIVE
PARATHYROID HORMONE INTACT: 55 PG/ML
PH URINE: 5.5
PHOSPHATE SERPL-MCNC: 2.8 MG/DL
POTASSIUM SERPL-SCNC: 3.9 MMOL/L
PROT UR-MCNC: 192 MG/DL
PROTEIN URINE: 100 MG/DL
RED BLOOD CELLS URINE: 1 /HPF
REVIEW: NORMAL
SODIUM SERPL-SCNC: 138 MMOL/L
SPECIFIC GRAVITY URINE: 1.02
URATE SERPL-MCNC: 4.4 MG/DL
UROBILINOGEN URINE: 0.2 MG/DL
WHITE BLOOD CELLS URINE: 130 /HPF

## 2023-07-13 NOTE — PHYSICAL EXAM
[General Appearance - Alert] : alert [Auscultation Breath Sounds / Voice Sounds] : lungs were clear to auscultation bilaterally [Heart Rate And Rhythm] : heart rate was normal and rhythm regular [Heart Sounds] : normal S1 and S2 [Heart Sounds Gallop] : no gallops [Murmurs] : no murmurs [Heart Sounds Pericardial Friction Rub] : no pericardial rub [Oriented To Time, Place, And Person] : oriented to person, place, and time [Impaired Insight] : insight and judgment were intact [Affect] : the affect was normal [General Appearance - In No Acute Distress] : in no acute distress [Neck Appearance] : the appearance of the neck was normal [Involuntary Movements] : no involuntary movements were seen [] : no rash [FreeTextEntry1] : tr LE edema

## 2023-07-13 NOTE — ASSESSMENT
[FreeTextEntry1] : lab data reviewed from from 7/6/2023\par \par 88 yo woman with CKD 3, HTN, LE edema, DM\par -CKD 3- DOLORES? - Cr 2.  stable range 0.97- 1.44.  Recheck today.  \par -hypercalcemia- resolved, Ca 10.3\par -hyperkalemia- resolved, K 3.9. c/w low K diet.\par -proteinuria- last ACR 16, good-  c/w valsartan.  recent 24hr urine protein suggests may be more elevated. check UPCR. \par -DM- A1C acceptable\par -hematuria-  resolved\par -HTN-  BP good- prior had been too low- no symptoms of hypotension- no change to regimen today to monitor closely-\par \par f/u 3-4 months\par \par addendum 7/13: Cr rising (2.47).  UPCR 1.1 (also above baseline). Labs otherwise acceptable.  Will repeat UA with culture and do renal/bladder US to r/o retention.  Discussed plan with daughter Jose Guadalupe who assists with care coordination.  Instructed to have labs repeated at geriatric f/u appt on 7/21.

## 2023-07-13 NOTE — HISTORY OF PRESENT ILLNESS
[FreeTextEntry1] : 88 yo woman for f/u evaluation of HTN, proteinuria, CKD, CAD, DMT2 (diet controlled), HLD, LE edema.\par Recent bone marrow biopsy at [facility], following up in next couple week for results.  \par No major complaints.  No med changes.  \par can do ADLs but someone always with her- mild dementia sees gerontologist at [facility]\par generally says she feels okay- still not venturing out much\par eating a bit better- family helping with food choices\par No HAs or CP, SOB, DELUCA\par Denies flank pain, dysuria, hematuria or frothy urine \par \par \par PMH: \par Had colonoscopy 10/19/2018 + diverticular disease\par had urodynamics at [facility]- does not recall results\par  MRI brain possible "ministrokes" or early dementia\par Has 4 cardiac and 4 left leg and 3 right leg stents

## 2023-07-16 ENCOUNTER — RESULT REVIEW (OUTPATIENT)
Age: 88
End: 2023-07-16

## 2023-07-16 ENCOUNTER — OUTPATIENT (OUTPATIENT)
Dept: OUTPATIENT SERVICES | Facility: HOSPITAL | Age: 88
LOS: 1 days | End: 2023-07-16
Payer: MEDICARE

## 2023-07-16 ENCOUNTER — APPOINTMENT (OUTPATIENT)
Dept: ULTRASOUND IMAGING | Facility: HOSPITAL | Age: 88
End: 2023-07-16
Payer: MEDICARE

## 2023-07-16 DIAGNOSIS — Z95.5 PRESENCE OF CORONARY ANGIOPLASTY IMPLANT AND GRAFT: Chronic | ICD-10-CM

## 2023-07-16 PROCEDURE — 76770 US EXAM ABDO BACK WALL COMP: CPT

## 2023-07-16 PROCEDURE — 76770 US EXAM ABDO BACK WALL COMP: CPT | Mod: 26

## 2023-07-19 ENCOUNTER — APPOINTMENT (OUTPATIENT)
Dept: HEART AND VASCULAR | Facility: CLINIC | Age: 88
End: 2023-07-19

## 2023-07-26 ENCOUNTER — APPOINTMENT (OUTPATIENT)
Dept: ENDOCRINOLOGY | Facility: CLINIC | Age: 88
End: 2023-07-26
Payer: MEDICARE

## 2023-07-26 ENCOUNTER — APPOINTMENT (OUTPATIENT)
Dept: HEART AND VASCULAR | Facility: CLINIC | Age: 88
End: 2023-07-26
Payer: MEDICARE

## 2023-07-26 ENCOUNTER — NON-APPOINTMENT (OUTPATIENT)
Age: 88
End: 2023-07-26

## 2023-07-26 VITALS
HEART RATE: 61 BPM | WEIGHT: 110 LBS | SYSTOLIC BLOOD PRESSURE: 123 MMHG | DIASTOLIC BLOOD PRESSURE: 72 MMHG | HEIGHT: 58 IN | BODY MASS INDEX: 23.09 KG/M2

## 2023-07-26 VITALS
DIASTOLIC BLOOD PRESSURE: 73 MMHG | TEMPERATURE: 97.2 F | HEART RATE: 61 BPM | HEIGHT: 58 IN | BODY MASS INDEX: 23.09 KG/M2 | OXYGEN SATURATION: 96 % | SYSTOLIC BLOOD PRESSURE: 144 MMHG | WEIGHT: 110 LBS

## 2023-07-26 DIAGNOSIS — R79.89 OTHER SPECIFIED ABNORMAL FINDINGS OF BLOOD CHEMISTRY: ICD-10-CM

## 2023-07-26 PROCEDURE — 82962 GLUCOSE BLOOD TEST: CPT

## 2023-07-26 PROCEDURE — ZZZZZ: CPT

## 2023-07-26 PROCEDURE — 93000 ELECTROCARDIOGRAM COMPLETE: CPT

## 2023-07-26 PROCEDURE — 99214 OFFICE O/P EST MOD 30 MIN: CPT | Mod: 25

## 2023-07-26 PROCEDURE — 93306 TTE W/DOPPLER COMPLETE: CPT

## 2023-07-26 NOTE — HISTORY OF PRESENT ILLNESS
[FreeTextEntry1] : Since her last visit, patient is feeling well overall.\par \par She reports increased fatigue and lack of appetite. She lost 5 lbs over the last 2 months. \par \par She gets home PT twice/week. On 23 her BP was recorded as 86/61 mm Hg. On two separate occasions at home her readings were 89/49 and 106/70. She denies any lightheadedness/dizziness. She did not take her BP medication yet today.\par \par She is on Lasix 20 mg daily and denies any lower extremity swelling. She admits to some shortness of breath with walking. \par \par Her recent LDL (2023) was 113 above goal LDL < 70. She reports compliance with Rosuvastatin 40 mg daily. \par \par Pt. denies any chest pain, orthopnea, PND, palpitations or syncope.\par \par ========================\par Labs\par 2023\par Lipid profile: T, TC: 185, HDL: 60, LDL: 113\par 2023\par Lipid profile: T, TC: 199, HDL: 76, LDL: 110\par A1c: 6.1%\par \par ECHO (2022): mild MR, normal LVSF with EF 51%, mild diastolic dysfunction (stage 1), normal RV size and function

## 2023-07-26 NOTE — PHYSICAL EXAM
[Normal Conjunctiva] : normal conjunctiva [Normal Venous Pressure] : normal venous pressure [No Carotid Bruit] : no carotid bruit [Normal S1, S2] : normal S1, S2 [No Murmur] : no murmur [Normal Gait] : normal gait [Normal] : alert and oriented, normal memory [de-identified] : trace edema bilaterally

## 2023-07-26 NOTE — DISCUSSION/SUMMARY
[FreeTextEntry1] : EKG: Sinus  Bradycardia 59/min, anteroseptal infarct.\par ECHO:LV 45%, apical hypo, mod-severe TR, mild MR, impaired relaxation, AV sclerosis, no AS \par \par 1. CAD: Currently no CP; Continue with medical management, ASA, Metoprolol and Isosorbide mononitrate\par 2. CKD: follow up with nephrology\par 3. DM: follow up with endocrinology\par 4. PVD: Severe disease, continue follow up with vascular\par 5. HLD: LDL Goal < 70. Last LDL (03/2023) 110 above goal LDL , 70 - add Zetia 10mg po daily\par

## 2023-07-26 NOTE — REASON FOR VISIT
[Coronary Artery Disease] : coronary artery disease [FreeTextEntry1] : Pt. is a 87 year old F with PMHx of CAD (s/p CABG and multiple PCI's), DM, CKD and PVD here for follow up.\par

## 2023-07-27 NOTE — PHYSICAL EXAM
[Alert] : alert [Normal Sclera/Conjunctiva] : normal sclera/conjunctiva [Normal Outer Ear/Nose] : the ears and nose were normal in appearance [Clear to Auscultation] : lungs were clear to auscultation bilaterally [Normal Rate] : heart rate was normal [Regular Rhythm] : with a regular rhythm [No Edema] : no peripheral edema [Normal Bowel Sounds] : normal bowel sounds [Kyphosis] : kyphosis present [No Stigmata of Cushings Syndrome] : no stigmata of Cushings Syndrome [Cranial Nerves Intact] : cranial nerves 2-12 were intact [Oriented x3] : oriented to person, place, and time [No Neck Mass] : no neck mass was observed [No Thyroid Nodules] : no palpable thyroid nodules [de-identified] : No edema in LE. [de-identified] : Uses walker to ambulate

## 2023-07-27 NOTE — DATA REVIEWED
[FreeTextEntry1] : Scanned Labs:\par - 07/06/23: s.creat 2.02, Free T4 0.96, \par - 03/14/23: LDL-c 110, TSH 0.54, Free T4 1.3, s.creat 1.33, A1c 6.1%, \par - 03/04/22: A1c 6.2% (H), Free T4 0.95, Free T3 2.39, LDL-c 119.20, s.creat 1.05\par - 02/08/19: s.creat 1.15, Cholesterol 212, LDL-c 138, Vit D 25-OH 25.1, Vit B12 753\par \par Scanned Imaging:\par - 04/24/17 FNA L upper pole 1.0 cm nodule: Marbury II (pt also had an FNA biopsy on 1/28/16)\par - 03/02/17 Thyroid US: R lobe contains 3 nodules. R Nodule 1 - midportion 1.1 x 0.8 x 0.8 cm spongiform. Nodule 2 - midportion 0.6 x 0.4 x 0.7 cm spongiform. Nodule 3 - lower pole 0.5 x 0.3 x 0.4 cm spongiform. L lobe contains 2 nodules. Nodule 1 - superior to midportion 1.0 x 0.6 x 1.0 cm hypoechoic solid with cystic components (FNA recommended). Nodule 2 - mid to lower portion 2.1 x 1.2 x 1.4 cm spongiform. Isthmus contains one midportion 0.5 x 0.3 x 0.6 cm spongiform nodule. \par - 06/24/16 BMD: Left Hip (Total) T-score -1.2. Femoral neck T-score -2.2. Spine T-score -1.6. Total Radius T-score -2.6. Radius segment 1/3 from the wrist to the elbow T-score -2.2. Impression: There is diminished bone density in the left hip, lumbar spine and in the segment one third of the way from the wrist to the elbow. Overall bone density in the L radius is in the osteoporotic range. \par

## 2023-07-27 NOTE — HISTORY OF PRESENT ILLNESS
[FreeTextEntry1] : 86 y/o F pt, with Hx of T2DM (dx in 2016), Hx of b/l thyroid nodules, and Hx of osteoporosis (BMD 2016). \par # T2DM (dx in 2016; currently diet controlled)\par DM related complications of peripheral neuropathy, CKD, CAD- cardiac stent in 2017\par \par # B/l Thyroid nodules with 1 negative FNA biopsy in 2017 (refer to 'Data Reviewed' for details)\par - Hx of intermittent low TSH levels. \par \par # Osteoporosis (BMD 2016 - might be on Fosamax as per pt; was on arthritis medication). \par Kyphosis. Denies history of bone fractures. \par Partially edentulous. Uses walker due to trouble walking.  \par \par Other PMHx: Hypercholesterolemia, HTN, MI in 2003, Multiple PCI, CABG. \par SHx: Lives alone. Has an aid who works 4 hrs daily. Pt's children cooks for her.\par Sees Vascular Dr. Guerra and Dr. Gimenez at [facility]\par Last cardiologist, Dr. London Raymond, visit: 03/02/22.\par Pt participated in the [facility], and was discharged from it to f/u with her PCP/Geriatric Dr. Reyes. \par Dr. Gabi Saldana\par \par 07/26/2023\par Pt has POCT 103, /72 and BMI 22.99. She lost 5 lbs in 2 months.  \par CC: "I am not doing well"\par Pt endorses "sleeping a lot" and loss of appetite. As per pt's daughter, her mother only wants to eat potatoes and peanut butter. Overall, she has no physical complaints. She denies difficulty breathing, difficulty swallowing, and voice changes. \par Pt is currently going to [facility] for PCP and GI and She recently had a bone marrow biopsy. \par \par Review of pt's chart:\par - 06/24/23: Ca 10.6, s.creat 1.78 LDL-c 118\par - 07/30/23 TSH 1.7, Free T4 3.96\par \par [Medications verified as per pt on 07/26/2023]    \par Current Medications: "Fosamax" - might be taking for osteoporosis as per pt, Rosuvastatin 80 mg, Plavix 75 mg, Vitamin D, Lasix (for eye), Amlodipine 5 mg, Folic acid 1 mg, Pantoprazole 20 mg, Valsartan 160 mg, Metoprolol ER 50 mg qd, Citalopram 10 mg, ASA 80 mg ,Vit E, vit D3 , Escitalopram

## 2023-07-27 NOTE — RESULTS/DATA
[T-Score ___] : T-score: [unfilled] [FreeTextEntry2] : 06/24/16 [de-identified] : Spine T-score -1.6. [de-identified] : Left Hip (Total) T-score -1.2.  [de-identified] : Femoral neck T-score -2.2.  [de-identified] : Total Radius T-score -2.6. [FreeTextEntry1] : Impression: There is diminished bone density in the left hip, lumbar spine and in the segment one third of the way from the wrist to the elbow. Overall bone density in the L radius is in the osteoporotic range.

## 2023-07-27 NOTE — ADDENDUM
[FreeTextEntry1] : I, Aisha Delacruz, act soley as a scribe for Dr. Denton Mckenzie on this date. 07/26/2023

## 2023-07-27 NOTE — REVIEW OF SYSTEMS
[Pain/Numbness of Digits] : pain/numbness of digits [As Noted in HPI] : as noted in HPI [Negative] : Heme/Lymph [Recent Weight Gain (___ Lbs)] : no recent weight gain [Recent Weight Loss (___ Lbs)] : no recent weight loss [Dysphagia] : no dysphagia [Difficulty Breathing] : no dyspnea [FreeTextEntry4] : Denies voice changes.  [de-identified] : Memory loss

## 2023-07-27 NOTE — END OF VISIT
[FreeTextEntry3] : All medical record entries made by the Scribe were at my, Dr. Denton Mckenzie, direction and personally dictated by me on 07/26/2023. I have reviewed the chart and agree that the record accurately reflects my personal performance of the history, physical exam, assessment and plan. I have also personally directed, reviewed and agreed with the chart.  [Time Spent: ___ minutes] : I have spent [unfilled] minutes of time on the encounter.

## 2023-07-30 ENCOUNTER — APPOINTMENT (OUTPATIENT)
Dept: ULTRASOUND IMAGING | Facility: HOSPITAL | Age: 88
End: 2023-07-30

## 2023-07-30 ENCOUNTER — OUTPATIENT (OUTPATIENT)
Dept: OUTPATIENT SERVICES | Facility: HOSPITAL | Age: 88
LOS: 1 days | End: 2023-07-30
Payer: MEDICARE

## 2023-07-30 DIAGNOSIS — Z95.5 PRESENCE OF CORONARY ANGIOPLASTY IMPLANT AND GRAFT: Chronic | ICD-10-CM

## 2023-07-30 PROCEDURE — 76536 US EXAM OF HEAD AND NECK: CPT | Mod: 26

## 2023-07-30 PROCEDURE — 76536 US EXAM OF HEAD AND NECK: CPT

## 2023-08-09 ENCOUNTER — APPOINTMENT (OUTPATIENT)
Dept: HEART AND VASCULAR | Facility: CLINIC | Age: 88
End: 2023-08-09

## 2023-09-06 ENCOUNTER — APPOINTMENT (OUTPATIENT)
Dept: HEART AND VASCULAR | Facility: CLINIC | Age: 88
End: 2023-09-06

## 2023-09-14 ENCOUNTER — OFFICE (OUTPATIENT)
Dept: URBAN - METROPOLITAN AREA CLINIC 28 | Facility: CLINIC | Age: 88
Setting detail: OPHTHALMOLOGY
End: 2023-09-14
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
## 2023-09-14 PROCEDURE — NO SHOW FE NO SHOW FEE: Performed by: OPHTHALMOLOGY

## 2023-09-15 ENCOUNTER — APPOINTMENT (OUTPATIENT)
Dept: HEART AND VASCULAR | Facility: CLINIC | Age: 88
End: 2023-09-15
Payer: MEDICARE

## 2023-09-15 VITALS
HEIGHT: 58 IN | HEART RATE: 51 BPM | OXYGEN SATURATION: 95 % | TEMPERATURE: 97.6 F | DIASTOLIC BLOOD PRESSURE: 64 MMHG | BODY MASS INDEX: 22.67 KG/M2 | WEIGHT: 108 LBS | SYSTOLIC BLOOD PRESSURE: 132 MMHG

## 2023-09-15 DIAGNOSIS — M79.604 PAIN IN RIGHT LEG: ICD-10-CM

## 2023-09-15 PROCEDURE — 99214 OFFICE O/P EST MOD 30 MIN: CPT | Mod: 25

## 2023-09-15 PROCEDURE — 93923 UPR/LXTR ART STDY 3+ LVLS: CPT

## 2023-09-18 PROBLEM — M79.604 LEG PAIN, RIGHT: Status: ACTIVE | Noted: 2022-03-26

## 2023-09-25 LAB
ALBUMIN SERPL ELPH-MCNC: 4.8 G/DL
ANION GAP SERPL CALC-SCNC: 13 MMOL/L
APPEARANCE: CLEAR
BACTERIA UR CULT: NORMAL
BACTERIA: NEGATIVE /HPF
BASOPHILS # BLD AUTO: 0.04 K/UL
BASOPHILS NFR BLD AUTO: 0.5 %
BILIRUBIN URINE: NEGATIVE
BLOOD URINE: ABNORMAL
BUN SERPL-MCNC: 26 MG/DL
CALCIUM SERPL-MCNC: 10.8 MG/DL
CALCIUM SERPL-MCNC: 10.8 MG/DL
CAST: 6 /LPF
CHLORIDE SERPL-SCNC: 104 MMOL/L
CO2 SERPL-SCNC: 24 MMOL/L
COLOR: YELLOW
CREAT SERPL-MCNC: 1.62 MG/DL
CREAT SPEC-SCNC: 60 MG/DL
CREAT/PROT UR: 1.1 RATIO
CYSTATIN C SERPL-MCNC: 1.9 MG/L
EGFR: 31 ML/MIN/1.73M2
EOSINOPHIL # BLD AUTO: 0.26 K/UL
EOSINOPHIL NFR BLD AUTO: 3.4 %
EPITHELIAL CELLS: 1 /HPF
FINE GRANULAR CASTS: PRESENT
GFR/BSA.PRED SERPLBLD CYS-BASED-ARV: 28 ML/MIN/1.73M2
GLUCOSE QUALITATIVE U: NEGATIVE MG/DL
GLUCOSE SERPL-MCNC: 102 MG/DL
HCT VFR BLD CALC: 35.6 %
HGB BLD-MCNC: 11 G/DL
IMM GRANULOCYTES NFR BLD AUTO: 0.3 %
IRON SATN MFR SERPL: 19 %
IRON SERPL-MCNC: 58 UG/DL
KETONES URINE: NEGATIVE MG/DL
LEUKOCYTE ESTERASE URINE: ABNORMAL
LYMPHOCYTES # BLD AUTO: 1.68 K/UL
LYMPHOCYTES NFR BLD AUTO: 21.8 %
MAN DIFF?: NORMAL
MCHC RBC-ENTMCNC: 28.1 PG
MCHC RBC-ENTMCNC: 30.9 GM/DL
MCV RBC AUTO: 90.8 FL
MICROSCOPIC-UA: NORMAL
MONOCYTES # BLD AUTO: 0.58 K/UL
MONOCYTES NFR BLD AUTO: 7.5 %
NEUTROPHILS # BLD AUTO: 5.14 K/UL
NEUTROPHILS NFR BLD AUTO: 66.5 %
NITRITE URINE: NEGATIVE
PARATHYROID HORMONE INTACT: 50 PG/ML
PH URINE: 6
PHOSPHATE SERPL-MCNC: 2.6 MG/DL
PLATELET # BLD AUTO: 233 K/UL
POTASSIUM SERPL-SCNC: 3.9 MMOL/L
PROT UR-MCNC: 67 MG/DL
PROTEIN URINE: 30 MG/DL
RBC # BLD: 3.92 M/UL
RBC # FLD: 14.2 %
RED BLOOD CELLS URINE: NORMAL /HPF
REVIEW: NORMAL
SODIUM SERPL-SCNC: 141 MMOL/L
SPECIFIC GRAVITY URINE: 1.01
TIBC SERPL-MCNC: 311 UG/DL
UIBC SERPL-MCNC: 252 UG/DL
UROBILINOGEN URINE: 0.2 MG/DL
WBC # FLD AUTO: 7.72 K/UL
WHITE BLOOD CELLS URINE: 2 /HPF

## 2023-09-26 ENCOUNTER — APPOINTMENT (OUTPATIENT)
Dept: NEPHROLOGY | Facility: CLINIC | Age: 88
End: 2023-09-26
Payer: MEDICARE

## 2023-09-26 VITALS
DIASTOLIC BLOOD PRESSURE: 64 MMHG | WEIGHT: 110 LBS | HEART RATE: 64 BPM | SYSTOLIC BLOOD PRESSURE: 110 MMHG | BODY MASS INDEX: 22.99 KG/M2

## 2023-09-26 PROCEDURE — 99214 OFFICE O/P EST MOD 30 MIN: CPT

## 2023-09-26 RX ORDER — CHOLECALCIFEROL (VITAMIN D3)
CRYSTALS MISCELLANEOUS
Refills: 0 | Status: ACTIVE | COMMUNITY

## 2023-09-26 RX ORDER — MIRTAZAPINE 7.5 MG/1
7.5 TABLET, FILM COATED ORAL
Refills: 0 | Status: ACTIVE | COMMUNITY

## 2023-09-28 RX ORDER — PNV NO.95/FERROUS FUM/FOLIC AC 28MG-0.8MG
500-3.125 TABLET ORAL
Qty: 90 | Refills: 3 | Status: DISCONTINUED | COMMUNITY
Start: 2023-03-02 | End: 2023-09-28

## 2023-09-29 ENCOUNTER — APPOINTMENT (OUTPATIENT)
Dept: NEUROLOGY | Facility: CLINIC | Age: 88
End: 2023-09-29

## 2023-10-02 ENCOUNTER — OFFICE (OUTPATIENT)
Dept: URBAN - METROPOLITAN AREA CLINIC 28 | Facility: CLINIC | Age: 88
Setting detail: OPHTHALMOLOGY
End: 2023-10-02
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
## 2023-10-02 PROCEDURE — NO SHOW FE NO SHOW FEE: Performed by: OPHTHALMOLOGY

## 2023-10-04 RX ORDER — EZETIMIBE 10 MG/1
10 TABLET ORAL
Qty: 90 | Refills: 3 | Status: ACTIVE | COMMUNITY
Start: 2023-07-26 | End: 1900-01-01

## 2023-10-05 ENCOUNTER — RX RENEWAL (OUTPATIENT)
Age: 88
End: 2023-10-05

## 2023-10-16 ENCOUNTER — RX ONLY (RX ONLY)
Age: 88
End: 2023-10-16

## 2023-10-16 ENCOUNTER — OFFICE (OUTPATIENT)
Dept: URBAN - METROPOLITAN AREA CLINIC 28 | Facility: CLINIC | Age: 88
Setting detail: OPHTHALMOLOGY
End: 2023-10-16
Payer: MEDICARE

## 2023-10-16 DIAGNOSIS — H01.004: ICD-10-CM

## 2023-10-16 DIAGNOSIS — E11.9: ICD-10-CM

## 2023-10-16 DIAGNOSIS — H47.233: ICD-10-CM

## 2023-10-16 DIAGNOSIS — H01.001: ICD-10-CM

## 2023-10-16 DIAGNOSIS — H16.223: ICD-10-CM

## 2023-10-16 DIAGNOSIS — H40.1113: ICD-10-CM

## 2023-10-16 DIAGNOSIS — H26.491: ICD-10-CM

## 2023-10-16 DIAGNOSIS — H40.1122: ICD-10-CM

## 2023-10-16 PROCEDURE — 92020 GONIOSCOPY: CPT | Performed by: OPHTHALMOLOGY

## 2023-10-16 PROCEDURE — 92133 CPTRZD OPH DX IMG PST SGM ON: CPT | Performed by: OPHTHALMOLOGY

## 2023-10-16 PROCEDURE — 92014 COMPRE OPH EXAM EST PT 1/>: CPT | Performed by: OPHTHALMOLOGY

## 2023-10-16 PROCEDURE — 92202 OPSCPY EXTND ON/MAC DRAW: CPT | Performed by: OPHTHALMOLOGY

## 2023-10-16 ASSESSMENT — REFRACTION_CURRENTRX
OS_OVR_VA: 20/
OD_OVR_VA: 20/
OS_SPHERE: +3.00
OD_CYLINDER: -0.50
OD_SPHERE: +3.00
OS_CYLINDER: -0.50
OD_AXIS: 095
OS_AXIS: 120

## 2023-10-16 ASSESSMENT — VISUAL ACUITY
OS_BCVA: 20/30
OD_BCVA: 20/30

## 2023-10-16 ASSESSMENT — LID EXAM ASSESSMENTS
OD_BLEPHARITIS: RUL 1+
OS_BLEPHARITIS: LUL 1+

## 2023-10-16 ASSESSMENT — PACHYMETRY
OD_CT_UM: 627
OD_CT_CORRECTION: -6
OS_CT_UM: 596
OS_CT_CORRECTION: -4

## 2023-10-16 ASSESSMENT — CONFRONTATIONAL VISUAL FIELD TEST (CVF)
OD_FINDINGS: FULL
OS_FINDINGS: FULL

## 2023-10-16 ASSESSMENT — TEAR BREAK UP TIME (TBUT)
OS_TBUT: 1+ 2+
OD_TBUT: 1+ 2+

## 2023-10-16 ASSESSMENT — TONOMETRY
OS_IOP_MMHG: 13
OD_IOP_MMHG: 12

## 2023-11-01 ENCOUNTER — APPOINTMENT (OUTPATIENT)
Dept: HEART AND VASCULAR | Facility: CLINIC | Age: 88
End: 2023-11-01
Payer: MEDICARE

## 2023-11-01 VITALS
HEIGHT: 58 IN | OXYGEN SATURATION: 96 % | WEIGHT: 109 LBS | BODY MASS INDEX: 22.88 KG/M2 | HEART RATE: 58 BPM | DIASTOLIC BLOOD PRESSURE: 50 MMHG | SYSTOLIC BLOOD PRESSURE: 96 MMHG

## 2023-11-01 PROCEDURE — 99214 OFFICE O/P EST MOD 30 MIN: CPT

## 2023-11-01 PROCEDURE — 93000 ELECTROCARDIOGRAM COMPLETE: CPT

## 2023-12-06 ENCOUNTER — APPOINTMENT (OUTPATIENT)
Dept: HEART AND VASCULAR | Facility: CLINIC | Age: 88
End: 2023-12-06
Payer: MEDICARE

## 2023-12-06 VITALS
BODY MASS INDEX: 23.09 KG/M2 | WEIGHT: 110 LBS | DIASTOLIC BLOOD PRESSURE: 65 MMHG | HEIGHT: 58 IN | HEART RATE: 58 BPM | SYSTOLIC BLOOD PRESSURE: 121 MMHG | OXYGEN SATURATION: 96 %

## 2023-12-06 DIAGNOSIS — E11.9 TYPE 2 DIABETES MELLITUS W/OUT COMPLICATIONS: ICD-10-CM

## 2023-12-06 PROCEDURE — 99214 OFFICE O/P EST MOD 30 MIN: CPT | Mod: 25

## 2023-12-06 PROCEDURE — 93923 UPR/LXTR ART STDY 3+ LVLS: CPT

## 2023-12-23 LAB — GLUCOSE BLDC GLUCOMTR-MCNC: 103

## 2024-01-08 ENCOUNTER — LABORATORY RESULT (OUTPATIENT)
Age: 89
End: 2024-01-08

## 2024-01-10 ENCOUNTER — APPOINTMENT (OUTPATIENT)
Dept: NEPHROLOGY | Facility: CLINIC | Age: 89
End: 2024-01-10

## 2024-01-26 ENCOUNTER — APPOINTMENT (OUTPATIENT)
Dept: NEPHROLOGY | Facility: CLINIC | Age: 89
End: 2024-01-26
Payer: MEDICARE

## 2024-01-26 VITALS — WEIGHT: 110 LBS | BODY MASS INDEX: 22.99 KG/M2

## 2024-01-26 VITALS — SYSTOLIC BLOOD PRESSURE: 89 MMHG | DIASTOLIC BLOOD PRESSURE: 49 MMHG | HEART RATE: 68 BPM

## 2024-01-26 VITALS — HEART RATE: 65 BPM | DIASTOLIC BLOOD PRESSURE: 48 MMHG | SYSTOLIC BLOOD PRESSURE: 90 MMHG

## 2024-01-26 DIAGNOSIS — R31.29 OTHER MICROSCOPIC HEMATURIA: ICD-10-CM

## 2024-01-26 PROCEDURE — 99215 OFFICE O/P EST HI 40 MIN: CPT

## 2024-01-26 PROCEDURE — G2211 COMPLEX E/M VISIT ADD ON: CPT

## 2024-01-26 NOTE — PHYSICAL EXAM
[General Appearance - Alert] : alert [General Appearance - In No Acute Distress] : in no acute distress [] : no respiratory distress [Auscultation Breath Sounds / Voice Sounds] : lungs were clear to auscultation bilaterally [Heart Sounds] : normal S1 and S2 [Heart Rate And Rhythm] : heart rate was normal and rhythm regular [Murmurs] : no murmurs [Heart Sounds Gallop] : no gallops [Heart Sounds Pericardial Friction Rub] : no pericardial rub [Abnormal Walk] : normal gait [Nail Clubbing] : no clubbing  or cyanosis of the fingernails [Musculoskeletal - Swelling] : no joint swelling seen [Motor Tone] : muscle strength and tone were normal [Oriented To Time, Place, And Person] : oriented to person, place, and time [Affect] : the affect was normal [Impaired Insight] : insight and judgment were intact

## 2024-01-26 NOTE — ASSESSMENT
[FreeTextEntry1] : 89 y/o F with PMHX of  CKD, CD, CHF, HTN, DM, MI       P: Medications reviewed. DC amlodipine and Furosemide Previous lab results discussed new labs ordered Diet/healthy life style counselling done with patient and caregiver Follow up in 2 weeks

## 2024-01-26 NOTE — HISTORY OF PRESENT ILLNESS
[___ Month(s) Ago] : [unfilled] month(s) ago [Stage 4] : stage 4 [Stable] : stable [Good Compliance] : good compliance  [FreeTextEntry1] : 89 y/o F with PMHX of  CKD, CD, CHF, HTN, DM, MI    for follow up   Patient is feeling well. no new health issues No SOB.CP, no N/V No hematuria, no dysuria

## 2024-01-27 PROBLEM — R31.29 MICROSCOPIC HEMATURIA: Status: ACTIVE | Noted: 2018-12-20

## 2024-01-31 ENCOUNTER — APPOINTMENT (OUTPATIENT)
Dept: ENDOCRINOLOGY | Facility: CLINIC | Age: 89
End: 2024-01-31

## 2024-02-02 ENCOUNTER — LABORATORY RESULT (OUTPATIENT)
Age: 89
End: 2024-02-02

## 2024-02-05 ENCOUNTER — OFFICE (OUTPATIENT)
Dept: URBAN - METROPOLITAN AREA CLINIC 28 | Facility: CLINIC | Age: 89
Setting detail: OPHTHALMOLOGY
End: 2024-02-05
Payer: MEDICARE

## 2024-02-05 DIAGNOSIS — H01.004: ICD-10-CM

## 2024-02-05 DIAGNOSIS — H40.1113: ICD-10-CM

## 2024-02-05 DIAGNOSIS — H47.233: ICD-10-CM

## 2024-02-05 DIAGNOSIS — H16.223: ICD-10-CM

## 2024-02-05 DIAGNOSIS — H01.001: ICD-10-CM

## 2024-02-05 DIAGNOSIS — H40.1122: ICD-10-CM

## 2024-02-05 DIAGNOSIS — H26.491: ICD-10-CM

## 2024-02-05 DIAGNOSIS — E11.9: ICD-10-CM

## 2024-02-05 PROCEDURE — 99213 OFFICE O/P EST LOW 20 MIN: CPT | Performed by: OPHTHALMOLOGY

## 2024-02-05 ASSESSMENT — REFRACTION_CURRENTRX
OS_AXIS: 120
OS_SPHERE: +3.00
OS_OVR_VA: 20/
OD_AXIS: 095
OD_CYLINDER: -0.50
OS_CYLINDER: -0.50
OD_OVR_VA: 20/
OD_SPHERE: +3.00

## 2024-02-05 ASSESSMENT — REFRACTION_AUTOREFRACTION
OD_SPHERE: +1.00
OS_AXIS: 064
OD_CYLINDER: -0.75
OD_AXIS: 085
OS_SPHERE: +0.25
OS_CYLINDER: -1.00

## 2024-02-05 ASSESSMENT — LID EXAM ASSESSMENTS
OS_BLEPHARITIS: LUL 1+
OD_BLEPHARITIS: RUL 1+

## 2024-02-05 ASSESSMENT — TEAR BREAK UP TIME (TBUT)
OD_TBUT: 1+ 2+
OS_TBUT: 1+ 2+

## 2024-02-05 ASSESSMENT — SPHEQUIV_DERIVED
OS_SPHEQUIV: -0.25
OD_SPHEQUIV: 0.625

## 2024-02-06 ENCOUNTER — LABORATORY RESULT (OUTPATIENT)
Age: 89
End: 2024-02-06

## 2024-02-09 ENCOUNTER — APPOINTMENT (OUTPATIENT)
Dept: NEPHROLOGY | Facility: CLINIC | Age: 89
End: 2024-02-09
Payer: MEDICARE

## 2024-02-09 VITALS — DIASTOLIC BLOOD PRESSURE: 50 MMHG | SYSTOLIC BLOOD PRESSURE: 108 MMHG | HEART RATE: 68 BPM

## 2024-02-09 DIAGNOSIS — D63.8 ANEMIA IN OTHER CHRONIC DISEASES CLASSIFIED ELSEWHERE: ICD-10-CM

## 2024-02-09 PROCEDURE — 99215 OFFICE O/P EST HI 40 MIN: CPT

## 2024-02-09 PROCEDURE — G2211 COMPLEX E/M VISIT ADD ON: CPT

## 2024-02-09 NOTE — ASSESSMENT
[FreeTextEntry1] : all lab data was reviewed with patient in detail from 2/2 and 2/6/2024 89 y/o woman with CKD 3, proteinuria, CAD, CHF, HTN, DM  and MGUS. Last OV 1/26, was hypotensive sBP 90 with hypercalcemia and acute rise in creat.- held amlodipine and furosemide BP, calcium and creat improved- BP still on low side; New LE edema -CKD 3-4 with acute rise in creat- creat back down to 1.90- good -hypotension with h/o HTN- continue off amlodipine, reduce valsartan to 1/2 tab daily -LE edema- resume furosemide -proteinuria-  Ualb/creat 125- acceptable- c/w RASi - but will reduce valsartan to 1/2 tab daily -hypercalcemia-  Ca back to WNL at 9.7- continue off Vit D -MGUS-  IgG K; K/L ratio 7.23- instructed to f/u with her hematologist- copy of labs provided -anemia- drop in hgb to 8.9 denies black stools, BRBRP- component related to volume expansion- needs further clarification- as above needs to see heme and also instructed to see PCP  discussed in detail with pt's daughter on phone- and given written instructions restart furosemide; stay off amlodipine; reduce valsartan to 1/2 tab daily and see heme call if questions  f/u 8-10 weeks

## 2024-02-09 NOTE — REASON FOR VISIT
[Follow-Up] : a follow-up visit [Formal Caregiver] : formal caregiver [FreeTextEntry1] : for CKD, HTN, proteinuria

## 2024-02-09 NOTE — PHYSICAL EXAM
[General Appearance - Alert] : alert [General Appearance - In No Acute Distress] : in no acute distress [] : no respiratory distress [Auscultation Breath Sounds / Voice Sounds] : lungs were clear to auscultation bilaterally [Heart Rate And Rhythm] : heart rate was normal and rhythm regular [Heart Sounds] : normal S1 and S2 [Heart Sounds Gallop] : no gallops [Murmurs] : no murmurs [Heart Sounds Pericardial Friction Rub] : no pericardial rub [Oriented To Time, Place, And Person] : oriented to person, place, and time [Impaired Insight] : insight and judgment were intact [Affect] : the affect was normal [No CVA Tenderness] : no ~M costovertebral angle tenderness [FreeTextEntry1] : 1-2+ lower leg edema R>L

## 2024-02-09 NOTE — HISTORY OF PRESENT ILLNESS
[___ Month(s) Ago] : [unfilled] month(s) ago [Stage 4] : stage 4 [Good Compliance] : good compliance  [Stable] : stable [FreeTextEntry1] : 89 y/o woman with CKD 3, proteinuria, HTN, DMT2, CAD, HDL, CHF and MGUS, for follow up evaluation. seen 2 weeks ago and concerned about BP being too low and her labs remarkable for increase creat and hypercalcemia- was instructed to discontinue amlodipine and furosemide- feels okay today, except has noted increase  lower leg edema  R>L no pain Her health care aid reports that she is very good this week- was up and around  "even did some some dishes" appetite good Denies flank pain, dysuria, hematuria or frothy urine  No SOB.CP, no N/V No hematuria, no dysuria

## 2024-03-06 ENCOUNTER — APPOINTMENT (OUTPATIENT)
Dept: HEART AND VASCULAR | Facility: CLINIC | Age: 89
End: 2024-03-06
Payer: MEDICARE

## 2024-03-06 VITALS
DIASTOLIC BLOOD PRESSURE: 47 MMHG | BODY MASS INDEX: 23.09 KG/M2 | OXYGEN SATURATION: 96 % | HEART RATE: 67 BPM | HEIGHT: 58 IN | WEIGHT: 110 LBS | SYSTOLIC BLOOD PRESSURE: 95 MMHG

## 2024-03-06 DIAGNOSIS — I25.10 ATHEROSCLEROTIC HEART DISEASE OF NATIVE CORONARY ARTERY W/OUT ANGINA PECTORIS: ICD-10-CM

## 2024-03-06 PROCEDURE — 93000 ELECTROCARDIOGRAM COMPLETE: CPT

## 2024-03-06 PROCEDURE — 99214 OFFICE O/P EST MOD 30 MIN: CPT

## 2024-03-06 NOTE — REVIEW OF SYSTEMS
[Dyspnea on exertion] : dyspnea during exertion [Fever] : no fever [Chills] : no chills [SOB] : no shortness of breath [Chest Discomfort] : no chest discomfort [Lower Ext Edema] : no extremity edema [Palpitations] : no palpitations [Leg Claudication] : no intermittent leg claudication [Orthopnea] : no orthopnea [PND] : no PND [Cough] : no cough [Syncope] : no syncope [Dizziness] : no dizziness

## 2024-03-06 NOTE — HISTORY OF PRESENT ILLNESS
[FreeTextEntry1] : Since her last visit, patient is feeling well overall. Walks with a walker. More than a block exercise distance.  She reports compliance with Rosuvastatin 40 mg daily.   Pt. denies any chest pain, orthopnea, PND, palpitations or syncope.  ======================== Labs 2023 Lipid profile: T, TC: 185, HDL: 60, LDL: 113 2023 Lipid profile: T, TC: 199, HDL: 76, LDL: 110 A1c: 6.1%  ECHO (2022): mild MR, normal LVSF with EF 51%, mild diastolic dysfunction (stage 1), normal RV size and function

## 2024-03-06 NOTE — PHYSICAL EXAM
[Normal Conjunctiva] : normal conjunctiva [No Carotid Bruit] : no carotid bruit [Normal Venous Pressure] : normal venous pressure [Normal S1, S2] : normal S1, S2 [No Murmur] : no murmur [Normal Gait] : normal gait [Normal] : alert and oriented, normal memory [de-identified] : trace edema bilaterally

## 2024-03-06 NOTE — DISCUSSION/SUMMARY
[FreeTextEntry1] : EKG: NSR 65/min, anteroseptal infarct. NSST-T changes ECHO:LV 45%, apical hypo, mod-severe TR, mild MR, impaired relaxation, AV sclerosis, no AS   1. CAD: Currently no CP; Continue with medical management, ASA, hold Metoprolol b/o low BP and Isosorbide mononitrate. ECHO in summer 2024 2. CKD: follow up with nephrology, off amlodipine, valsartan 1/2 tab of 160mg 3. DM: follow up with endocrinology 4. PVD: Severe disease, continue follow up with vascular 5. HLD: cont rosuvastatin and Zetia 6. RTC 3 months

## 2024-03-13 ENCOUNTER — RX RENEWAL (OUTPATIENT)
Age: 89
End: 2024-03-13

## 2024-04-10 RX ORDER — FUROSEMIDE 20 MG/1
20 TABLET ORAL
Qty: 30 | Refills: 10 | Status: ACTIVE | COMMUNITY
Start: 1900-01-01 | End: 1900-01-01

## 2024-04-15 ENCOUNTER — OFFICE (OUTPATIENT)
Dept: URBAN - METROPOLITAN AREA CLINIC 28 | Facility: CLINIC | Age: 89
Setting detail: OPHTHALMOLOGY
End: 2024-04-15
Payer: MEDICARE

## 2024-04-15 DIAGNOSIS — H01.004: ICD-10-CM

## 2024-04-15 DIAGNOSIS — E11.9: ICD-10-CM

## 2024-04-15 DIAGNOSIS — H26.491: ICD-10-CM

## 2024-04-15 DIAGNOSIS — H01.001: ICD-10-CM

## 2024-04-15 DIAGNOSIS — H40.1113: ICD-10-CM

## 2024-04-15 DIAGNOSIS — H47.233: ICD-10-CM

## 2024-04-15 DIAGNOSIS — H16.223: ICD-10-CM

## 2024-04-15 DIAGNOSIS — H40.1122: ICD-10-CM

## 2024-04-15 PROCEDURE — 92012 INTRM OPH EXAM EST PATIENT: CPT | Performed by: OPHTHALMOLOGY

## 2024-04-15 ASSESSMENT — LID EXAM ASSESSMENTS
OS_BLEPHARITIS: LUL 1+
OD_BLEPHARITIS: RUL 1+

## 2024-04-24 ENCOUNTER — APPOINTMENT (OUTPATIENT)
Dept: NEPHROLOGY | Facility: CLINIC | Age: 89
End: 2024-04-24
Payer: MEDICARE

## 2024-04-24 ENCOUNTER — OFFICE (OUTPATIENT)
Dept: URBAN - METROPOLITAN AREA CLINIC 28 | Facility: CLINIC | Age: 89
Setting detail: OPHTHALMOLOGY
End: 2024-04-24
Payer: MEDICARE

## 2024-04-24 DIAGNOSIS — E21.3 HYPERPARATHYROIDISM, UNSPECIFIED: ICD-10-CM

## 2024-04-24 DIAGNOSIS — D47.2 MONOCLONAL GAMMOPATHY: ICD-10-CM

## 2024-04-24 DIAGNOSIS — M10.9 GOUT, UNSPECIFIED: ICD-10-CM

## 2024-04-24 DIAGNOSIS — R80.9 PROTEINURIA, UNSPECIFIED: ICD-10-CM

## 2024-04-24 DIAGNOSIS — N18.30 CHRONIC KIDNEY DISEASE, STAGE 3 UNSPECIFIED: ICD-10-CM

## 2024-04-24 DIAGNOSIS — H47.233: ICD-10-CM

## 2024-04-24 DIAGNOSIS — E83.52 HYPERCALCEMIA: ICD-10-CM

## 2024-04-24 DIAGNOSIS — H40.1122: ICD-10-CM

## 2024-04-24 DIAGNOSIS — H40.1113: ICD-10-CM

## 2024-04-24 PROCEDURE — 99214 OFFICE O/P EST MOD 30 MIN: CPT

## 2024-04-24 PROCEDURE — 66030 INJECTION TREATMENT OF EYE: CPT | Mod: RT | Performed by: OPHTHALMOLOGY

## 2024-04-24 PROCEDURE — G2211 COMPLEX E/M VISIT ADD ON: CPT

## 2024-04-24 PROCEDURE — 92012 INTRM OPH EXAM EST PATIENT: CPT | Mod: 25 | Performed by: OPHTHALMOLOGY

## 2024-04-24 RX ORDER — VALSARTAN 160 MG/1
160 TABLET, COATED ORAL
Qty: 1 | Refills: 1 | Status: ACTIVE | COMMUNITY
Start: 2023-03-01

## 2024-04-24 ASSESSMENT — LID EXAM ASSESSMENTS
OD_BLEPHARITIS: RUL 1+
OS_BLEPHARITIS: LUL 1+

## 2024-04-24 NOTE — PHYSICAL EXAM
[General Appearance - Alert] : alert [General Appearance - In No Acute Distress] : in no acute distress [] : no respiratory distress [Auscultation Breath Sounds / Voice Sounds] : lungs were clear to auscultation bilaterally [Heart Rate And Rhythm] : heart rate was normal and rhythm regular [Heart Sounds] : normal S1 and S2 [Heart Sounds Gallop] : no gallops [Murmurs] : no murmurs [Heart Sounds Pericardial Friction Rub] : no pericardial rub [No CVA Tenderness] : no ~M costovertebral angle tenderness [Oriented To Time, Place, And Person] : oriented to person, place, and time [Impaired Insight] : insight and judgment were intact [Affect] : the affect was normal [Edema] : there was no peripheral edema

## 2024-04-26 RX ORDER — METOPROLOL SUCCINATE 25 MG/1
25 TABLET, EXTENDED RELEASE ORAL
Qty: 90 | Refills: 3 | Status: ACTIVE | COMMUNITY

## 2024-04-26 RX ORDER — AMLODIPINE BESYLATE 5 MG/1
5 TABLET ORAL DAILY
Qty: 90 | Refills: 3 | Status: ACTIVE | COMMUNITY

## 2024-04-26 RX ORDER — ROSUVASTATIN CALCIUM 40 MG/1
40 TABLET, FILM COATED ORAL
Qty: 90 | Refills: 3 | Status: DISCONTINUED | COMMUNITY
Start: 2022-11-09 | End: 2024-04-26

## 2024-04-26 NOTE — ASSESSMENT
[FreeTextEntry1] : all lab data was reviewed with patient in detail from EHR- will retrieve newer data from heme- Dr. Joe 87 y/o woman with CKD 3, proteinuria, CAD, CHF, HTN, DM  and MGUS. -HTN- BP in better range- c/w present regimen Valsartan 160 mg 1/2 tab daily- furosemide, no amlodipine- though is on med  list from Dr. Joe office- to clarify -CKD 3-4 clinically stable  creat baseline about 1.9 -LE edema- resolved c/w furosemide -proteinuria-  Ualb/creat acceptable- c/w valsartan 1/2 tab daily -hypercalcemia-  Ca back to WNL at 9.7- continue off Vit D -MGUS-  IgG K; K/L ratio 7.23- saw heme ? MM will retrieve encounter note and recent labs -anemia-  as above review heme input   f/u 4 months new labs prior

## 2024-04-26 NOTE — HISTORY OF PRESENT ILLNESS
[___ Month(s) Ago] : [unfilled] month(s) ago [Stage 4] : stage 4 [Good Compliance] : good compliance  [Stable] : stable [FreeTextEntry1] : 89 yo woman here for f/u evaluation of CKD 3, proteinuria, HTN, DMT2, CAD, HDL, CHF and MGUS. feels fine today had low BP with OV 1/26/2024- BP meds were held- f/u on 2/9/2024  resumed furosemide 1 tablet daily and telmisartan was reduced to 1/2 tab daily prior labs remarkable for increase creat and hypercalcemia did see heme for her MGUS- she reports now that it is myeloma- will get note and recent labs from Dr. Joe office no pain Her health care aid reports that she is very good with ADLs including the dishes at times appetite good Denies flank pain, dysuria, hematuria or frothy urine  No SOB.CP, no N/V No hematuria, no dysuria

## 2024-04-29 ENCOUNTER — APPOINTMENT (OUTPATIENT)
Dept: ENDOCRINOLOGY | Facility: CLINIC | Age: 89
End: 2024-04-29
Payer: MEDICARE

## 2024-04-29 VITALS
WEIGHT: 111 LBS | HEART RATE: 77 BPM | BODY MASS INDEX: 23.2 KG/M2 | SYSTOLIC BLOOD PRESSURE: 128 MMHG | DIASTOLIC BLOOD PRESSURE: 74 MMHG

## 2024-04-29 DIAGNOSIS — M81.0 AGE-RELATED OSTEOPOROSIS W/OUT CURRENT PATHOLOGICAL FRACTURE: ICD-10-CM

## 2024-04-29 DIAGNOSIS — E04.2 NONTOXIC MULTINODULAR GOITER: ICD-10-CM

## 2024-04-29 LAB
GLUCOSE BLDC GLUCOMTR-MCNC: 94
HBA1C MFR BLD HPLC: 5.8

## 2024-04-29 PROCEDURE — 83036 HEMOGLOBIN GLYCOSYLATED A1C: CPT | Mod: QW

## 2024-04-29 PROCEDURE — 82962 GLUCOSE BLOOD TEST: CPT

## 2024-04-29 PROCEDURE — G2211 COMPLEX E/M VISIT ADD ON: CPT

## 2024-04-29 PROCEDURE — 36415 COLL VENOUS BLD VENIPUNCTURE: CPT

## 2024-04-29 PROCEDURE — 99213 OFFICE O/P EST LOW 20 MIN: CPT

## 2024-05-01 ENCOUNTER — OFFICE (OUTPATIENT)
Dept: URBAN - METROPOLITAN AREA CLINIC 28 | Facility: CLINIC | Age: 89
Setting detail: OPHTHALMOLOGY
End: 2024-05-01
Payer: MEDICARE

## 2024-05-01 DIAGNOSIS — H40.1122: ICD-10-CM

## 2024-05-01 DIAGNOSIS — E05.90 THYROTOXICOSIS, UNSPECIFIED W/OUT THYROTOXIC CRISIS OR STORM: ICD-10-CM

## 2024-05-01 LAB
25(OH)D3 SERPL-MCNC: 67 NG/ML
ALBUMIN SERPL ELPH-MCNC: 4.3 G/DL
ALP BLD-CCNC: 78 U/L
ALT SERPL-CCNC: 10 U/L
ANION GAP SERPL CALC-SCNC: 12 MMOL/L
AST SERPL-CCNC: 19 U/L
BILIRUB SERPL-MCNC: 0.2 MG/DL
BUN SERPL-MCNC: 27 MG/DL
CALCIUM SERPL-MCNC: 10.5 MG/DL
CHLORIDE SERPL-SCNC: 102 MMOL/L
CHOLEST SERPL-MCNC: 148 MG/DL
CO2 SERPL-SCNC: 24 MMOL/L
CREAT SERPL-MCNC: 1.59 MG/DL
EGFR: 31 ML/MIN/1.73M2
FRUCTOSAMINE SERPL-MCNC: 307 UMOL/L
GLUCOSE SERPL-MCNC: 99 MG/DL
HDLC SERPL-MCNC: 71 MG/DL
LDLC SERPL CALC-MCNC: 62 MG/DL
NONHDLC SERPL-MCNC: 77 MG/DL
POTASSIUM SERPL-SCNC: 4.6 MMOL/L
PROT SERPL-MCNC: 7.6 G/DL
SODIUM SERPL-SCNC: 138 MMOL/L
T4 FREE SERPL-MCNC: 1.3 NG/DL
TRIGL SERPL-MCNC: 81 MG/DL
TSH SERPL-ACNC: 0.05 UIU/ML

## 2024-05-01 PROCEDURE — 66030 INJECTION TREATMENT OF EYE: CPT | Mod: 79,LT | Performed by: OPHTHALMOLOGY

## 2024-05-01 ASSESSMENT — CONFRONTATIONAL VISUAL FIELD TEST (CVF)
OS_FINDINGS: FULL
OD_FINDINGS: FULL

## 2024-05-01 ASSESSMENT — LID EXAM ASSESSMENTS
OS_BLEPHARITIS: LUL 1+
OD_BLEPHARITIS: RUL 1+

## 2024-05-06 ENCOUNTER — APPOINTMENT (OUTPATIENT)
Dept: HEART AND VASCULAR | Facility: CLINIC | Age: 89
End: 2024-05-06

## 2024-05-08 ENCOUNTER — OFFICE (OUTPATIENT)
Dept: URBAN - METROPOLITAN AREA CLINIC 28 | Facility: CLINIC | Age: 89
Setting detail: OPHTHALMOLOGY
End: 2024-05-08
Payer: MEDICARE

## 2024-05-08 DIAGNOSIS — H47.233: ICD-10-CM

## 2024-05-08 DIAGNOSIS — H40.1113: ICD-10-CM

## 2024-05-08 DIAGNOSIS — H40.1122: ICD-10-CM

## 2024-05-08 PROCEDURE — 92012 INTRM OPH EXAM EST PATIENT: CPT | Performed by: OPHTHALMOLOGY

## 2024-05-08 ASSESSMENT — LID EXAM ASSESSMENTS
OD_BLEPHARITIS: RUL 1+
OS_BLEPHARITIS: LUL 1+

## 2024-05-08 ASSESSMENT — CONFRONTATIONAL VISUAL FIELD TEST (CVF)
OS_FINDINGS: FULL
OD_FINDINGS: FULL

## 2024-05-09 ENCOUNTER — NON-APPOINTMENT (OUTPATIENT)
Age: 89
End: 2024-05-09

## 2024-05-10 ENCOUNTER — APPOINTMENT (OUTPATIENT)
Dept: HEART AND VASCULAR | Facility: CLINIC | Age: 89
End: 2024-05-10
Payer: MEDICARE

## 2024-05-10 VITALS
WEIGHT: 111 LBS | HEART RATE: 70 BPM | DIASTOLIC BLOOD PRESSURE: 67 MMHG | TEMPERATURE: 98.5 F | HEIGHT: 58 IN | BODY MASS INDEX: 23.3 KG/M2 | OXYGEN SATURATION: 92 % | SYSTOLIC BLOOD PRESSURE: 148 MMHG

## 2024-05-10 DIAGNOSIS — I73.9 PERIPHERAL VASCULAR DISEASE, UNSPECIFIED: ICD-10-CM

## 2024-05-10 DIAGNOSIS — E78.00 PURE HYPERCHOLESTEROLEMIA, UNSPECIFIED: ICD-10-CM

## 2024-05-10 DIAGNOSIS — E11.42 TYPE 2 DIABETES MELLITUS WITH DIABETIC POLYNEUROPATHY: ICD-10-CM

## 2024-05-10 DIAGNOSIS — Z95.5 PRESENCE OF CORONARY ANGIOPLASTY IMPLANT AND GRAFT: ICD-10-CM

## 2024-05-10 PROCEDURE — 93923 UPR/LXTR ART STDY 3+ LVLS: CPT

## 2024-05-10 PROCEDURE — 99214 OFFICE O/P EST MOD 30 MIN: CPT | Mod: 25

## 2024-05-13 PROBLEM — I73.9 CLAUDICATION, CLASS III: Status: ACTIVE | Noted: 2022-02-24

## 2024-05-13 PROBLEM — Z95.5 S/P CORONARY ARTERY STENT PLACEMENT: Status: ACTIVE | Noted: 2017-08-29

## 2024-05-13 PROBLEM — E11.42 DIABETIC PERIPHERAL NEUROPATHY: Status: ACTIVE | Noted: 2017-08-31

## 2024-05-29 ENCOUNTER — OUTPATIENT (OUTPATIENT)
Dept: OUTPATIENT SERVICES | Facility: HOSPITAL | Age: 89
LOS: 1 days | End: 2024-05-29
Payer: MEDICARE

## 2024-05-29 ENCOUNTER — APPOINTMENT (OUTPATIENT)
Dept: NUCLEAR MEDICINE | Facility: HOSPITAL | Age: 89
End: 2024-05-29

## 2024-05-29 DIAGNOSIS — Z95.5 PRESENCE OF CORONARY ANGIOPLASTY IMPLANT AND GRAFT: Chronic | ICD-10-CM

## 2024-05-30 ENCOUNTER — APPOINTMENT (OUTPATIENT)
Dept: NUCLEAR MEDICINE | Facility: HOSPITAL | Age: 89
End: 2024-05-30

## 2024-05-30 PROCEDURE — 78014 THYROID IMAGING W/BLOOD FLOW: CPT

## 2024-05-30 PROCEDURE — A9516: CPT

## 2024-05-30 PROCEDURE — 78014 THYROID IMAGING W/BLOOD FLOW: CPT | Mod: 26,MH

## 2024-06-03 NOTE — HISTORY OF PRESENT ILLNESS
[FreeTextEntry1] : JEIMY FORD is a 88 year old female with pmhx of T2D, multiple thyroid nodules, osteoporosis (managed by PCP), CKD, CAD (s/p stent in 2017), CHF who presents for T2D follow-up.   Patient of Dr. Mckenzie, last visit, 7/26/23   Interval change: "Doing ok" + myeloma diagnosis, following hemeonc Feels that providers "beat around the bush" with explaining medical things to her, or just inform her children and not her of her wellbeing, which she does not like Denies compressive symptoms No h/o BG monitoring given well controlled nature of diabetes   A1C - 5.8% (4/29/24) from 6% (1/10/24) from 6.1% (3/2023) + CKD, most recent GFR of 25 (2/4/24), anemia also noted (8.9/28.1), which may impact A1C's accuracy for BG monitoring. Office Fingerstick -- 94 (~3H postprandial)   Current medication: - None    Past medication: - None   Diabetes Self-Management: No self BG monitoring   Diet-- Typically consumes 2-3 meals/daily, +/- snacks  Ophthalmology: UTD, within past month   2. Multiple thyroid nodules S/p FNA in 4/2017 of left lobe nodule, bethesda II Most recent thyroid US 7/2023, as below Last TFTs 3/2023, euthyroid Denies compressive symptoms   3. Osteoporosis, managed by PCP No fracture history  No known history Past treatment includes fosamax. No current treatment  Unsure of last DEXA

## 2024-06-03 NOTE — PHYSICAL EXAM
[Alert] : alert [No Acute Distress] : no acute distress [Normal Voice/Communication] : normal voice communication [Normal Hearing] : hearing was normal [No Respiratory Distress] : no respiratory distress [Normal Rate and Effort] : normal respiratory rate and effort [Oriented x3] : oriented to person, place, and time [Normal Affect] : the affect was normal [Normal Insight/Judgement] : insight and judgment were intact [Normal Mood] : the mood was normal [de-identified] : Ambulates with walker

## 2024-06-03 NOTE — ADDENDUM
[FreeTextEntry1] : 05/01/2024 addendum, SG VM left in attempt to review lab results, VM left requesting return call +return of subclinical hyperthyroidism with TSH 0.05 and normal FT4, no h/o uptake scan on file. Given MNG, intermittent pattern of subclinical hyperthyroid state, likely toxic nodule, but will recommend uptake scan be performed. Auth submitted through [facility] with Case# [id number]  Fructosamine of 307, c/w A1C of 6.8%, controlled nature of diabetes without need for medication at this time CMP with stable renal function  06/03/2024, addendum, SG Called and spoke with patients daughter rE: NM thyroid uptake results Thyroid uptake with upper limit normal in MNG pattern  with prominent activity in left lower pole, which correspondes with nodule. Given subclinical hyperthyroidism with recent TSH of 0.05 and FT4 1.8, discussed starting methimazole regimen (2.5mg/daily) to achieve euthyroid state given her CVD and osteoporosis.   Daughter contemplative, will discuss with mother and cards and possibly defer decision to Dr Mckenzie visit next month

## 2024-06-03 NOTE — REVIEW OF SYSTEMS
[Constipation] : constipation [Myalgia] : myalgia  [Difficulty Walking] : difficulty walking [Negative] : Psychiatric [Fatigue] : no fatigue [Polydipsia] : no polydipsia

## 2024-06-03 NOTE — ASSESSMENT
[FreeTextEntry1] : 1. MNG H/o FNA in 2017 of left nodule, [place] II Most recent thyroid US from 7/30/23 reveals growth of right lower lobe partially cystic with eccentric solid area nodule (1.6 x 1.3 x 1.5cm from 1.1 x 0.8 x 0.8 in 2017) and left mid-lower pole spongiform nodule (2.3 x 1.3 x 2cm from 2.1 x 1.2 x 1.4cm in 2017. Euthyroid on labs from March 2023, but h/o suppressed TSH (0.1-0.22 from [id number]) -- repeat TFTs -- repeat thyroid US, if continued growth, consider FNA vs continued monitoring  2. T2D A1C goal <7.5% A1C - 5.8% (4/29/24) from 6% (1/10/24) from 6.1% (3/2023) + CKD, most recent GFR of 25 (2/4/24), anemia also noted (8.9/28.1), which may impact A1C's accuracy for BG monitoring. Current regimen: None Glucometer readings at home reveal N/A, no SBGM Glucometer reading performed in office today is at goal postprandially -- fructosamine with labs today  Labs today, I will call/[facility] message with results Schedule thyroid US, I will call with results if performed sooner rather than closer to FU with Dr. Mckenzie Follow up in July with Dr. Mckenzie in July  Kerry Yost, MS, [employer], [facility] 04/29/2024

## 2024-06-12 ENCOUNTER — APPOINTMENT (OUTPATIENT)
Dept: HEART AND VASCULAR | Facility: CLINIC | Age: 89
End: 2024-06-12
Payer: MEDICARE

## 2024-06-12 VITALS
TEMPERATURE: 98.2 F | BODY MASS INDEX: 23.3 KG/M2 | HEART RATE: 80 BPM | WEIGHT: 111 LBS | SYSTOLIC BLOOD PRESSURE: 153 MMHG | OXYGEN SATURATION: 94 % | HEIGHT: 58 IN | DIASTOLIC BLOOD PRESSURE: 72 MMHG

## 2024-06-12 VITALS — DIASTOLIC BLOOD PRESSURE: 82 MMHG | SYSTOLIC BLOOD PRESSURE: 149 MMHG

## 2024-06-12 DIAGNOSIS — I25.10 ATHEROSCLEROTIC HEART DISEASE OF NATIVE CORONARY ARTERY W/OUT ANGINA PECTORIS: ICD-10-CM

## 2024-06-12 DIAGNOSIS — I50.9 HEART FAILURE, UNSPECIFIED: ICD-10-CM

## 2024-06-12 DIAGNOSIS — I10 ESSENTIAL (PRIMARY) HYPERTENSION: ICD-10-CM

## 2024-06-12 DIAGNOSIS — E11.9 TYPE 2 DIABETES MELLITUS W/OUT COMPLICATIONS: ICD-10-CM

## 2024-06-12 DIAGNOSIS — R00.1 BRADYCARDIA, UNSPECIFIED: ICD-10-CM

## 2024-06-12 PROCEDURE — 99214 OFFICE O/P EST MOD 30 MIN: CPT

## 2024-06-12 PROCEDURE — 93000 ELECTROCARDIOGRAM COMPLETE: CPT

## 2024-06-12 RX ORDER — CILOSTAZOL 50 MG/1
50 TABLET ORAL
Qty: 180 | Refills: 3 | Status: ACTIVE | COMMUNITY
Start: 2023-04-12 | End: 1900-01-01

## 2024-06-12 NOTE — HISTORY OF PRESENT ILLNESS
[FreeTextEntry1] : Pt. is a 88 year old F with PMHx of CAD (s/p CABG and multiple PCI's), DM, CKD and PVD here for follow up.Since her last visit, patient is feeling well overall. Walks with a walker. More than a block exercise distance.  She reports compliance with Rosuvastatin 40 mg daily.   Pt. denies any chest pain, orthopnea, PND, palpitations or syncope.   ECHO (03/2022): mild MR, normal LVSF with EF 51%, mild diastolic dysfunction (stage 1), normal RV size and function

## 2024-06-12 NOTE — PHYSICAL EXAM
[Normal Conjunctiva] : normal conjunctiva [Normal Venous Pressure] : normal venous pressure [No Carotid Bruit] : no carotid bruit [Normal S1, S2] : normal S1, S2 [No Murmur] : no murmur [Normal Gait] : normal gait [Normal] : alert and oriented, normal memory [de-identified] : trace edema bilaterally

## 2024-06-12 NOTE — DISCUSSION/SUMMARY
[FreeTextEntry1] : EKG: NSR 65/min, anteroseptal infarct. NSST-T changes ECHO:LV 45%, apical hypo, mod-severe TR, mild MR, impaired relaxation, AV sclerosis, no AS   1. CAD: Currently no CP; Continue with medical management, ASA, hold Metoprolol b/o low BP and Isosorbide mononitrate. ECHO on next visit 2. CKD: follow up with nephrology, off amlodipine, valsartan 1/2 tab of 160mg 3. DM: follow up with endocrinology 4. PVD: Severe disease, continue follow up with vascular 5. HLD: cont rosuvastatin and Zetia 6. RTC 3 months

## 2024-07-31 ENCOUNTER — APPOINTMENT (OUTPATIENT)
Dept: ENDOCRINOLOGY | Facility: CLINIC | Age: 89
End: 2024-07-31

## 2024-07-31 VITALS
SYSTOLIC BLOOD PRESSURE: 134 MMHG | HEART RATE: 68 BPM | DIASTOLIC BLOOD PRESSURE: 69 MMHG | WEIGHT: 106 LBS | BODY MASS INDEX: 22.15 KG/M2

## 2024-07-31 DIAGNOSIS — M81.0 AGE-RELATED OSTEOPOROSIS W/OUT CURRENT PATHOLOGICAL FRACTURE: ICD-10-CM

## 2024-07-31 DIAGNOSIS — E11.9 TYPE 2 DIABETES MELLITUS W/OUT COMPLICATIONS: ICD-10-CM

## 2024-07-31 DIAGNOSIS — E04.2 NONTOXIC MULTINODULAR GOITER: ICD-10-CM

## 2024-07-31 DIAGNOSIS — E05.90 THYROTOXICOSIS, UNSPECIFIED W/OUT THYROTOXIC CRISIS OR STORM: ICD-10-CM

## 2024-07-31 LAB
GLUCOSE BLDC GLUCOMTR-MCNC: 162
HBA1C MFR BLD HPLC: 6

## 2024-07-31 PROCEDURE — 36415 COLL VENOUS BLD VENIPUNCTURE: CPT

## 2024-07-31 PROCEDURE — 82962 GLUCOSE BLOOD TEST: CPT

## 2024-07-31 PROCEDURE — 83036 HEMOGLOBIN GLYCOSYLATED A1C: CPT | Mod: QW

## 2024-07-31 NOTE — PHYSICAL EXAM
[Alert] : alert [Normal Sclera/Conjunctiva] : normal sclera/conjunctiva [Normal Outer Ear/Nose] : the ears and nose were normal in appearance [No Neck Mass] : no neck mass was observed [No Thyroid Nodules] : no palpable thyroid nodules [Clear to Auscultation] : lungs were clear to auscultation bilaterally [Normal Rate] : heart rate was normal [Regular Rhythm] : with a regular rhythm [No Edema] : no peripheral edema [Normal Bowel Sounds] : normal bowel sounds [Kyphosis] : kyphosis present [No Stigmata of Cushings Syndrome] : no stigmata of Cushings Syndrome [Cranial Nerves Intact] : cranial nerves 2-12 were intact [Oriented x3] : oriented to person, place, and time [de-identified] : No edema in LE. [de-identified] : Uses walker to ambulate

## 2024-07-31 NOTE — REASON FOR VISIT
[Follow - Up] : a follow-up visit [DM Type 2] : DM Type 2 [Osteoporosis] : osteoporosis [Thyroid nodule/ MNG] : thyroid nodule/ MNG [Formal Caregiver] : formal caregiver

## 2024-07-31 NOTE — END OF VISIT
[FreeTextEntry3] :  All medical record entries made by the Scribe were at my, Dr. Denton Mckenzie, direction and personally dictated by me on 07/31/2024. I have reviewed the chart and agree that the record accurately reflects my personal performance of the history, physical exam, assessment and plan. I have also personally directed, reviewed and agreed with the chart. [Time Spent: ___ minutes] : I have spent [unfilled] minutes of time on the encounter.

## 2024-07-31 NOTE — DATA REVIEWED
[FreeTextEntry1] : Scanned Labs: - 07/06/23: s.creat 2.02, Free T4 0.96,  - 03/14/23: LDL-c 110, TSH 0.54, Free T4 1.3, s.creat 1.33, A1c 6.1%,  - 03/04/22: A1c 6.2% (H), Free T4 0.95, Free T3 2.39, LDL-c 119.20, s.creat 1.05 - 02/08/19: s.creat 1.15, Cholesterol 212, LDL-c 138, Vit D 25-OH 25.1, Vit B12 753  Scanned Imaging: - 05/30/24 Radioactive iodine uptake at 24 hours: 40.9%. There is a prominence of activity at the lower pole of the left lobe of the thyroid. Impression: Images typical of multinodular goiter. Radioactive iodine uptake is at the upper limit of normal. - 04/24/17 FNA L upper pole 1.0 cm nodule: [place] II (pt also had an FNA biopsy on 1/28/16) - 03/02/17 Thyroid US: R lobe contains 3 nodules. R Nodule 1 - midportion 1.1 x 0.8 x 0.8 cm spongiform. Nodule 2 - midportion 0.6 x 0.4 x 0.7 cm spongiform. Nodule 3 - lower pole 0.5 x 0.3 x 0.4 cm spongiform. L lobe contains 2 nodules. Nodule 1 - superior to midportion 1.0 x 0.6 x 1.0 cm hypoechoic solid with cystic components (FNA recommended). Nodule 2 - mid to lower portion 2.1 x 1.2 x 1.4 cm spongiform. Isthmus contains one midportion 0.5 x 0.3 x 0.6 cm spongiform nodule.  - 06/24/16 BMD: Left Hip (Total) T-score -1.2. Femoral neck T-score -2.2. Spine T-score -1.6. Total Radius T-score -2.6. Radius segment 1/3 from the wrist to the elbow T-score -2.2. Impression: There is diminished bone density in the left hip, lumbar spine and in the segment one third of the way from the wrist to the elbow. Overall bone density in the L radius is in the osteoporotic range.

## 2024-07-31 NOTE — ADDENDUM
[FreeTextEntry1] : I, Ayad You act solely as a scribe for Dr. Denton Mckenzie on this date 07/31/2024

## 2024-07-31 NOTE — HISTORY OF PRESENT ILLNESS
[FreeTextEntry1] : 89 y/o F pt, with Hx of T2DM (dx in 2016), Hx of b/l thyroid nodules, and Hx of osteoporosis (BMD 2016).  # T2DM (dx in 2016; currently diet controlled) DM related complications of peripheral neuropathy, CKD, CAD- cardiac stent in 2017  # B/l Thyroid nodules with 1 negative FNA biopsy in 2017 (refer to 'Data Reviewed' for details) - Hx of intermittent low TSH levels.   # Osteoporosis (BMD 2016 - might be on Fosamax as per pt; was on arthritis medication).  Kyphosis. Denies history of bone fractures.  Partially edentulous. Uses walker due to trouble walking.    Other PMHx: Hypercholesterolemia, HTN, MI in 2003, Multiple PCI, CABG.  SHx: Lives alone. Has an aid who works 4 hrs daily. Pt's children cooks for her. Sees Vascular Dr. Guerra and Dr. Gimenez at [facility] Last cardiologist, Dr. London Raymond, visit: 03/02/22. Pt participated in the [facility], and was discharged from it to / with her PCP/Geriatric Dr. Reyes.  Dr. Gabi Saldana  07/26/2023 Pt has POCT 103, /72 and BMI 22.99. She lost 5 lbs in 2 months.   CC: "I am not doing well" Pt endorses "sleeping a lot" and loss of appetite. As per pt's daughter, her mother only wants to eat potatoes and peanut butter. Overall, she has no physical complaints. She denies difficulty breathing, difficulty swallowing, and voice changes.  Pt is currently going to [facility] for PCP and GI and She recently had a bone marrow biopsy.   Review of pt's chart: - 06/24/23: Ca 10.6, s.creat 1.78 LDL-c 118 - 07/30/23 TSH 1.7, Free T4 3.96  07/31/2024  Pt has POCT 162, /69 and BMI 22.15. She lost 5 lbs in 1 months. CC: "I'm doing alright." We contacted pt's daughter, Jaimee, for information on medications. Pt continues to follow-up with her PCP at [place] for osteoporosis management with Fosamax. She recently completed GARCIA uptake scan on 05/30/24, 40.9% at 24 hours.  05/30/24 Radioactive iodine uptake at 24 hours: 40.9%. There is a prominence of activity at the lower pole of the left lobe of the thyroid. Impression: Images typical of multinodular goiter. Radioactive iodine uptake is at the upper limit of normal.  [Medications verified as per pt on 07/26/2023]     Current Medications: "Fosamax" - might be taking for osteoporosis as per pt, Rosuvastatin 80 mg, Plavix 75 mg, Vitamin D, Lasix (for eye), Amlodipine 5 mg, Folic acid 1 mg, Pantoprazole 20 mg, Valsartan 160 mg, Metoprolol ER 50 mg qd, Citalopram 10 mg, ASA 80 mg ,Vit E, vit D3 , Escitalopram

## 2024-07-31 NOTE — HISTORY OF PRESENT ILLNESS
[FreeTextEntry1] : 89 y/o F pt, with Hx of T2DM (dx in 2016), Hx of b/l thyroid nodules, and Hx of osteoporosis (BMD 2016).  # T2DM (dx in 2016; currently diet controlled) DM related complications of peripheral neuropathy, CKD, CAD- cardiac stent in 2017  # B/l Thyroid nodules with 1 negative FNA biopsy in 2017 (refer to 'Data Reviewed' for details) - Hx of intermittent low TSH levels.   # Osteoporosis (BMD 2016 - might be on Fosamax as per pt; was on arthritis medication).  Kyphosis. Denies history of bone fractures.  Partially edentulous. Uses walker due to trouble walking.    Other PMHx: Hypercholesterolemia, HTN, MI in 2003, Multiple PCI, CABG.  SHx: Lives alone. Has an aid who works 4 hrs daily. Pt's children cooks for her. Sees Vascular Dr. Guerra and Dr. Gimenez at [facility] Last cardiologist, Dr. London Raymond, visit: 03/02/22. Pt participated in the [facility], and was discharged from it to / with her PCP/Geriatric Dr. Reyes.  Dr. Gabi Saldana  07/26/2023 Pt has POCT 103, /72 and BMI 22.99. She lost 5 lbs in 2 months.   CC: "I am not doing well" Pt endorses "sleeping a lot" and loss of appetite. As per pt's daughter, her mother only wants to eat potatoes and peanut butter. Overall, she has no physical complaints. She denies difficulty breathing, difficulty swallowing, and voice changes.  Pt is currently going to [facility] for PCP and GI and She recently had a bone marrow biopsy.   Review of pt's chart: - 06/24/23: Ca 10.6, s.creat 1.78 LDL-c 118 - 07/30/23 TSH 1.7, Free T4 3.96  07/31/2024  Pt has POCT 162, /69 and BMI 22.15. She lost 5 lbs in 1 months. CC: "I'm doing alright." We contacted pt's daughter, Jaimee, for information on medications. Pt continues to follow-up with her PCP at [place] for osteoporosis management with Fosamax. She recently completed GARCIA uptake scan on 05/30/24, 40.9% at 24 hours.  05/30/24 Radioactive iodine uptake at 24 hours: 40.9%. There is a prominence of activity at the lower pole of the left lobe of the thyroid. Impression: Images typical of multinodular goiter. Radioactive iodine uptake is at the upper limit of normal.  [Medications verified as per pt on 07/26/2023]     Current Medications: "Fosamax" - might be taking for osteoporosis as per pt, Rosuvastatin 80 mg, Plavix 75 mg, Vitamin D, Lasix (for eye), Amlodipine 5 mg, Folic acid 1 mg, Pantoprazole 20 mg, Valsartan 160 mg, Metoprolol ER 50 mg qd, Citalopram 10 mg, ASA 80 mg ,Vit E, vit D3 , Escitalopram

## 2024-07-31 NOTE — PHYSICAL EXAM
[Alert] : alert [Normal Sclera/Conjunctiva] : normal sclera/conjunctiva [Normal Outer Ear/Nose] : the ears and nose were normal in appearance [No Neck Mass] : no neck mass was observed [No Thyroid Nodules] : no palpable thyroid nodules [Clear to Auscultation] : lungs were clear to auscultation bilaterally [Normal Rate] : heart rate was normal [Regular Rhythm] : with a regular rhythm [No Edema] : no peripheral edema [Normal Bowel Sounds] : normal bowel sounds [Kyphosis] : kyphosis present [No Stigmata of Cushings Syndrome] : no stigmata of Cushings Syndrome [Cranial Nerves Intact] : cranial nerves 2-12 were intact [Oriented x3] : oriented to person, place, and time [de-identified] : No edema in LE. [de-identified] : Uses walker to ambulate

## 2024-07-31 NOTE — RESULTS/DATA
[T-Score ___] : T-score: [unfilled] [FreeTextEntry2] : 06/24/16 [de-identified] : Spine T-score -1.6. [de-identified] : Left Hip (Total) T-score -1.2.  [de-identified] : Femoral neck T-score -2.2.  [de-identified] : Total Radius T-score -2.6. [FreeTextEntry1] : Impression: There is diminished bone density in the left hip, lumbar spine and in the segment one third of the way from the wrist to the elbow. Overall bone density in the L radius is in the osteoporotic range.

## 2024-07-31 NOTE — DATA REVIEWED
[FreeTextEntry1] : Scanned Labs: - 07/06/23: s.creat 2.02, Free T4 0.96,  - 03/14/23: LDL-c 110, TSH 0.54, Free T4 1.3, s.creat 1.33, A1c 6.1%,  - 03/04/22: A1c 6.2% (H), Free T4 0.95, Free T3 2.39, LDL-c 119.20, s.creat 1.05 - 02/08/19: s.creat 1.15, Cholesterol 212, LDL-c 138, Vit D 25-OH 25.1, Vit B12 753  Scanned Imaging: - 05/30/24 Radioactive iodine uptake at 24 hours: 40.9%. There is a prominence of activity at the lower pole of the left lobe of the thyroid. Impression: Images typical of multinodular goiter. Radioactive iodine uptake is at the upper limit of normal. - 04/24/17 FNA L upper pole 1.0 cm nodule: [place] II (pt also had an FNA biopsy on 1/28/16) - 03/02/17 Thyroid US: R lobe contains 3 nodules. R Nodule 1 - midportion 1.1 x 0.8 x 0.8 cm spongiform. Nodule 2 - midportion 0.6 x 0.4 x 0.7 cm spongiform. Nodule 3 - lower pole 0.5 x 0.3 x 0.4 cm spongiform. L lobe contains 2 nodules. Nodule 1 - superior to midportion 1.0 x 0.6 x 1.0 cm hypoechoic solid with cystic components (FNA recommended). Nodule 2 - mid to lower portion 2.1 x 1.2 x 1.4 cm spongiform. Isthmus contains one midportion 0.5 x 0.3 x 0.6 cm spongiform nodule.  - 06/24/16 BMD: Left Hip (Total) T-score -1.2. Femoral neck T-score -2.2. Spine T-score -1.6. Total Radius T-score -2.6. Radius segment 1/3 from the wrist to the elbow T-score -2.2. Impression: There is diminished bone density in the left hip, lumbar spine and in the segment one third of the way from the wrist to the elbow. Overall bone density in the L radius is in the osteoporotic range.

## 2024-07-31 NOTE — RESULTS/DATA
[T-Score ___] : T-score: [unfilled] [FreeTextEntry2] : 06/24/16 [de-identified] : Spine T-score -1.6. [de-identified] : Left Hip (Total) T-score -1.2.  [de-identified] : Femoral neck T-score -2.2.  [de-identified] : Total Radius T-score -2.6. [FreeTextEntry1] : Impression: There is diminished bone density in the left hip, lumbar spine and in the segment one third of the way from the wrist to the elbow. Overall bone density in the L radius is in the osteoporotic range.

## 2024-08-01 LAB
T3FREE SERPL-MCNC: 3.57 PG/ML
T4 FREE SERPL-MCNC: 1.5 NG/DL
TSH SERPL-ACNC: 0.01 UIU/ML

## 2024-08-08 ENCOUNTER — OFFICE (OUTPATIENT)
Dept: URBAN - METROPOLITAN AREA CLINIC 28 | Facility: CLINIC | Age: 89
Setting detail: OPHTHALMOLOGY
End: 2024-08-08
Payer: MEDICARE

## 2024-08-08 ENCOUNTER — APPOINTMENT (OUTPATIENT)
Dept: NEPHROLOGY | Facility: CLINIC | Age: 89
End: 2024-08-08

## 2024-08-08 DIAGNOSIS — H40.1113: ICD-10-CM

## 2024-08-08 DIAGNOSIS — H47.233: ICD-10-CM

## 2024-08-08 DIAGNOSIS — H40.1122: ICD-10-CM

## 2024-08-08 PROCEDURE — 92014 COMPRE OPH EXAM EST PT 1/>: CPT | Performed by: OPHTHALMOLOGY

## 2024-08-08 PROCEDURE — G2211 COMPLEX E/M VISIT ADD ON: CPT

## 2024-08-08 PROCEDURE — 99214 OFFICE O/P EST MOD 30 MIN: CPT

## 2024-08-08 PROCEDURE — 92020 GONIOSCOPY: CPT | Performed by: OPHTHALMOLOGY

## 2024-08-08 ASSESSMENT — LID EXAM ASSESSMENTS
OS_BLEPHARITIS: LUL 1+
OD_BLEPHARITIS: RUL 1+

## 2024-08-08 ASSESSMENT — CONFRONTATIONAL VISUAL FIELD TEST (CVF)
OS_FINDINGS: FULL
OD_FINDINGS: FULL

## 2024-08-08 NOTE — ASSESSMENT
[FreeTextEntry1] : all lab data was reviewed with patient in detail from 8/6/2024 89 y/o woman with CKD 3, proteinuria, CAD, CHF, HTN, DM  and MGUS. -HTN- BP controlled- c/w present regimen -CKD 4 creat 1.7- acceptable; Na, K, CO2 okay -LE edema- resolved- c/w furosemide -proteinuria-  UAC 33- good- c/w valsartan 1/2 tab daily -hypercalcemia-  Ca 10.0-okay- continue off vit d -hyperparathyroidism- - stable; defer vit d as Ca runs high- consider cinacalcet if Ca runs high -MGUS-  IgG K; K/L ratio 7.23-  does not recall if had bone marrow -anemia-  hgb 9.3- rising- trend   f/u 4 months new labs prior

## 2024-08-08 NOTE — HISTORY OF PRESENT ILLNESS
[___ Month(s) Ago] : [unfilled] month(s) ago [Stage 4] : stage 4 [Good Compliance] : good compliance  [Stable] : stable [FreeTextEntry1] : 87 yo woman here for f/u evaluation of CKD 3, proteinuria, HTN, DMT2, CAD, HDL, CHF and MGUS. feels fine today reviewed medications able to walk around her apartment Denies flank pain, dysuria, hematuria or frothy urine  No SOB.CP, no N/V No hematuria, no dysuria

## 2024-08-08 NOTE — ASSESSMENT
[FreeTextEntry1] : all lab data was reviewed with patient in detail from 8/6/2024 87 y/o woman with CKD 3, proteinuria, CAD, CHF, HTN, DM  and MGUS. -HTN- BP controlled- c/w present regimen -CKD 4 creat 1.7- acceptable; Na, K, CO2 okay -LE edema- resolved- c/w furosemide -proteinuria-  UAC 33- good- c/w valsartan 1/2 tab daily -hypercalcemia-  Ca 10.0-okay- continue off vit d -hyperparathyroidism- - stable; defer vit d as Ca runs high- consider cinacalcet if Ca runs high -MGUS-  IgG K; K/L ratio 7.23-  does not recall if had bone marrow -anemia-  hgb 9.3- rising- trend   f/u 4 months new labs prior

## 2024-08-08 NOTE — HISTORY OF PRESENT ILLNESS
[___ Month(s) Ago] : [unfilled] month(s) ago [Stage 4] : stage 4 [Good Compliance] : good compliance  [Stable] : stable [FreeTextEntry1] : 89 yo woman here for f/u evaluation of CKD 3, proteinuria, HTN, DMT2, CAD, HDL, CHF and MGUS. feels fine today reviewed medications able to walk around her apartment Denies flank pain, dysuria, hematuria or frothy urine  No SOB.CP, no N/V No hematuria, no dysuria

## 2024-08-13 ENCOUNTER — APPOINTMENT (OUTPATIENT)
Dept: ENDOCRINOLOGY | Facility: CLINIC | Age: 89
End: 2024-08-13

## 2024-08-13 DIAGNOSIS — E04.1 NONTOXIC SINGLE THYROID NODULE: ICD-10-CM

## 2024-08-13 NOTE — DATA REVIEWED
[FreeTextEntry1] : 7/31/24: TSH 0.01 FT4 1.5 FT3 3.57 A1c 6.0%  05/30/24 Radioactive iodine uptake at 24 hours: 40.9%. There is a prominence of activity at the lower pole of the left lobe of the thyroid. Impression: Images typical of multinodular goiter. Radioactive iodine uptake is at the upper limit of normal.

## 2024-08-13 NOTE — HISTORY OF PRESENT ILLNESS
[FreeTextEntry1] : Cora Oh is an 88 year old female who presents for follow up on toxic multinodular goiter, subclinical hyperthyroidism  Patient of Dr. Mckenzie, last seen 7/31/24, and pt has also seen Kerry Yost NP on 4/29/24  PMHx: CHF, CKD stage 3, CAD, borderline glaucoma bilaterally, diabetic peripheral neuropathy, osteoarthritis, HTN, HLD, hyperparathyroidism, left thyroid nodule/multiple thyroid nodules, osteoporosis (managed by PCP at [facility]), PAD, Type 2 DM  PSHx: PCI for stent of coronary artery  FMHx: Allergies: Colchicine, minocin, erthyromycin derivatives   Subclinical hyperthyroidism  5/30/24: Uptake scan 40.9% at 24 hrs, with prominent activity at left lower pole of thyroid Discussing radioactive iodine ablation today   Thyroid nodules: FNA biopsy of left lower pole nodule in 07/2017 -- [place] II  Most recent US: July 2023 (left mid/lower pole spongiform nodule increased in size since 2017 (now 2.3 x 1.2 x 2.0cm from 2.1 x 1.2 x 1.4cm in 2017) without suspicious features, right lower pole partially cystic with eccentric solid areas nodule (1.6 x 1.3 x 1.5cm from 1.1 x 0.8 x 0.8cm in 2017) without suspicious features, and with other subcentimeter nodules. Compressive symptoms --    Current medications:  "Fosamax"(?), Rosuvastatin 80 mg, Plavix 75 mg, Vitamin D, Lasix (for eye), Amlodipine 5 mg, Folic acid 1 mg, Pantoprazole 20 mg, Valsartan 160 mg, Metoprolol ER 50 mg qd, Citalopram 10 mg, ASA 80 mg,Vit E, vit D3, Escitalopram

## 2024-08-13 NOTE — HISTORY OF PRESENT ILLNESS
[FreeTextEntry1] : Cora Oh is an 88 year old female who presents for follow up on toxic multinodular goiter, subclinical hyperthyroidism  Patient of Dr. Mckenzie, last seen 7/31/24, and pt has also seen Kerry Yost NP on 4/29/24  PMHx: CHF, CKD stage 3, CAD, borderline glaucoma bilaterally, diabetic peripheral neuropathy, osteoarthritis, HTN, HLD, hyperparathyroidism, left thyroid nodule/multiple thyroid nodules, osteoporosis (managed by PCP at [facility]), PAD, Type 2 DM  PSHx: PCI for stent of coronary artery  FMHx: Allergies: Colchicine, minocin, erthyromycin derivatives   Subclinical hyperthyroidism  5/30/24: Uptake scan 40.9% at 24 hrs, with prominent activity at left lower pole of thyroid Discussing radioactive iodine ablation today   Thyroid nodules: FNA biopsy of left lower pole nodule in 07/2017 -- [place] II  Most recent US: July 2023 (left mid/lower pole spongiform nodule increased in size since 2017 (now 2.3 x 1.2 x 2.0cm from 2.1 x 1.2 x 1.4cm in 2017) without suspicious features, right lower pole partially cystic with eccentric solid areas nodule (1.6 x 1.3 x 1.5cm from 1.1 x 0.8 x 0.8cm in 2017) without suspicious features, and with other subcentimeter nodules. Compressive symptoms --    Current medications:  "Fosamax"(?), Rosuvastatin 80 mg, Plavix 75 mg, Vitamin D, Lasix (for eye), Amlodipine 5 mg, Folic acid 1 mg, Pantoprazole 20 mg, Valsartan 160 mg, Metoprolol ER 50 mg qd, Citalopram 10 mg, ASA 80 mg,Vit E, vit D3, Escitalopram

## 2024-08-13 NOTE — ASSESSMENT
[FreeTextEntry1] : Subclinical hyperthyroidism/TMG  Most recent US: July 2023 (left mid/lower pole spongiform nodule increased in size since 2017 (now 2.3 x 1.2 x 2.0cm from 2.1 x 1.2 x 1.4cm in 2017) without suspicious features, right lower pole partially cystic with eccentric solid areas nodule (1.6 x 1.3 x 1.5cm from 1.1 x 0.8 x 0.8cm in 2017) without suspicious features, and with other subcentimeter nodules.  May 2024: Thyroid uptake scan - 40.9% at 24 hrs, with prominent activity at left lower pole of thyroid. On the right side, a similar pattern is seen with less intensity overall. Comparison with sonography dated 7/31/2023 suggests that the area of increased activity at the left lower pole corresponds to a nodule seen on that study. Impression: Images typical of multinodular goiter.  Radioactive iodine uptake is at the upper limit of normal.  Plan

## 2024-08-19 ENCOUNTER — APPOINTMENT (OUTPATIENT)
Dept: ENDOCRINOLOGY | Facility: CLINIC | Age: 89
End: 2024-08-19

## 2024-08-30 ENCOUNTER — RX RENEWAL (OUTPATIENT)
Age: 89
End: 2024-08-30

## 2024-09-04 ENCOUNTER — OFFICE (OUTPATIENT)
Dept: URBAN - METROPOLITAN AREA CLINIC 28 | Facility: CLINIC | Age: 89
Setting detail: OPHTHALMOLOGY
End: 2024-09-04
Payer: MEDICARE

## 2024-09-04 DIAGNOSIS — H47.233: ICD-10-CM

## 2024-09-04 DIAGNOSIS — H40.1113: ICD-10-CM

## 2024-09-04 DIAGNOSIS — H40.1122: ICD-10-CM

## 2024-09-04 PROCEDURE — 92012 INTRM OPH EXAM EST PATIENT: CPT | Performed by: OPHTHALMOLOGY

## 2024-09-04 ASSESSMENT — LID EXAM ASSESSMENTS
OS_BLEPHARITIS: LUL 1+
OD_BLEPHARITIS: RUL 1+

## 2024-09-04 ASSESSMENT — CONFRONTATIONAL VISUAL FIELD TEST (CVF)
OD_FINDINGS: FULL
OS_FINDINGS: FULL

## 2024-09-18 ENCOUNTER — APPOINTMENT (OUTPATIENT)
Dept: HEART AND VASCULAR | Facility: CLINIC | Age: 89
End: 2024-09-18
Payer: MEDICARE

## 2024-09-18 VITALS
DIASTOLIC BLOOD PRESSURE: 70 MMHG | HEIGHT: 58 IN | TEMPERATURE: 97.5 F | WEIGHT: 112 LBS | BODY MASS INDEX: 23.51 KG/M2 | SYSTOLIC BLOOD PRESSURE: 118 MMHG | OXYGEN SATURATION: 96 % | HEART RATE: 73 BPM

## 2024-09-18 DIAGNOSIS — E11.9 TYPE 2 DIABETES MELLITUS W/OUT COMPLICATIONS: ICD-10-CM

## 2024-09-18 DIAGNOSIS — I25.10 ATHEROSCLEROTIC HEART DISEASE OF NATIVE CORONARY ARTERY W/OUT ANGINA PECTORIS: ICD-10-CM

## 2024-09-18 DIAGNOSIS — I50.9 HEART FAILURE, UNSPECIFIED: ICD-10-CM

## 2024-09-18 PROCEDURE — 93000 ELECTROCARDIOGRAM COMPLETE: CPT

## 2024-09-18 PROCEDURE — 99214 OFFICE O/P EST MOD 30 MIN: CPT

## 2024-09-18 NOTE — PHYSICAL EXAM
[Normal Conjunctiva] : normal conjunctiva [Normal Venous Pressure] : normal venous pressure [No Carotid Bruit] : no carotid bruit [Normal S1, S2] : normal S1, S2 [No Murmur] : no murmur [Normal Gait] : normal gait [Normal] : alert and oriented, normal memory [de-identified] : trace edema bilaterally 171

## 2024-09-18 NOTE — HISTORY OF PRESENT ILLNESS
[FreeTextEntry1] : Pt. is a 89 year old F with PMHx of CAD (s/p CABG and multiple PCI's), DM, CKD and PVD here for follow up.Since her last visit, patient is feeling well overall. Walks with a walker. More than a block exercise distance.  She reports compliance with Rosuvastatin 40 mg daily.   Pt. denies any chest pain, orthopnea, PND, palpitations or syncope.   ECHO (03/2022): mild MR, normal LVSF with EF 51%, mild diastolic dysfunction (stage 1), normal RV size and function

## 2024-09-18 NOTE — DISCUSSION/SUMMARY
[EKG obtained to assist in diagnosis and management of assessed problem(s)] : EKG obtained to assist in diagnosis and management of assessed problem(s) [FreeTextEntry1] : EKG: NSR 67/min, anteroseptal infarct. NSST-T changes ECHO:LV 45%, apical hypo, mod-severe TR, mild MR, impaired relaxation, AV sclerosis, no AS   1. CAD: Currently no CP; Continue with medical management, ASA, hold Metoprolol b/o low BP and Isosorbide mononitrate. ECHO on next visit 2. CKD: follow up with nephrology, off amlodipine, valsartan 1/2 tab of 160mg 3. DM: follow up with endocrinology 4. PVD: Severe disease, continue follow up with vascular 5. HLD: cont rosuvastatin and Zetia 6. RTC 3 months

## 2024-09-25 DIAGNOSIS — R79.89 OTHER SPECIFIED ABNORMAL FINDINGS OF BLOOD CHEMISTRY: ICD-10-CM

## 2024-10-01 ENCOUNTER — RX RENEWAL (OUTPATIENT)
Age: 89
End: 2024-10-01

## 2024-10-01 NOTE — ED ADULT NURSE NOTE - PMH
[FreeTextEntry1] : Patient likely with viral pharyngitis in setting of viral illness Rapid strep negative Throat culture sent to rule out strep.  If positive, will start Amoxicillin 500 mg tabs BID x 10 days Recommend supportive care with Tylenol or Motrin for fever or discomfort, increased fluids, salt water gargles, throat lozenges, tea with honey, cool mist humidifier Stool culture, ova and parasites and Cdiff ordered, mother to reach out to surgeon and GI today Return to office if symptoms worsen or persist
Asthma  dx Jan 2013, no intubations  Atherosclerosis of coronary artery  s/p CABG 2003, s/p PCI with CHRISTI x 3 07/2013  Chronic diastolic CHF (congestive heart failure)  last EF 45-50% Jan 2014  Essential hypertension  HTN (hypertension)  Glaucoma  Glaucoma  Hyperlipidemia  HLD (hyperlipidemia)  Osteoarthritis  Osteoarthritis  Osteomalacia  Vitamin D deficient osteomalacia  Peripheral vascular disease  PVD (peripheral vascular disease)

## 2024-11-07 ENCOUNTER — APPOINTMENT (OUTPATIENT)
Dept: ENDOCRINOLOGY | Facility: CLINIC | Age: 89
End: 2024-11-07
Payer: MEDICARE

## 2024-11-07 VITALS
DIASTOLIC BLOOD PRESSURE: 58 MMHG | BODY MASS INDEX: 21.95 KG/M2 | SYSTOLIC BLOOD PRESSURE: 109 MMHG | WEIGHT: 105 LBS | HEART RATE: 83 BPM

## 2024-11-07 DIAGNOSIS — E04.2 NONTOXIC MULTINODULAR GOITER: ICD-10-CM

## 2024-11-07 DIAGNOSIS — E05.90 THYROTOXICOSIS, UNSPECIFIED W/OUT THYROTOXIC CRISIS OR STORM: ICD-10-CM

## 2024-11-07 PROCEDURE — 83036 HEMOGLOBIN GLYCOSYLATED A1C: CPT | Mod: QW

## 2024-11-07 PROCEDURE — G2211 COMPLEX E/M VISIT ADD ON: CPT

## 2024-11-07 PROCEDURE — 36415 COLL VENOUS BLD VENIPUNCTURE: CPT

## 2024-11-07 PROCEDURE — 99214 OFFICE O/P EST MOD 30 MIN: CPT

## 2024-11-07 PROCEDURE — 82962 GLUCOSE BLOOD TEST: CPT

## 2024-11-08 ENCOUNTER — NON-APPOINTMENT (OUTPATIENT)
Age: 89
End: 2024-11-08

## 2024-11-08 ENCOUNTER — APPOINTMENT (OUTPATIENT)
Dept: HEART AND VASCULAR | Facility: CLINIC | Age: 89
End: 2024-11-08
Payer: MEDICARE

## 2024-11-08 VITALS
WEIGHT: 106 LBS | HEIGHT: 58 IN | OXYGEN SATURATION: 96 % | HEART RATE: 76 BPM | DIASTOLIC BLOOD PRESSURE: 66 MMHG | TEMPERATURE: 97.6 F | SYSTOLIC BLOOD PRESSURE: 146 MMHG | BODY MASS INDEX: 22.25 KG/M2

## 2024-11-08 DIAGNOSIS — I73.9 PERIPHERAL VASCULAR DISEASE, UNSPECIFIED: ICD-10-CM

## 2024-11-08 DIAGNOSIS — E11.9 TYPE 2 DIABETES MELLITUS W/OUT COMPLICATIONS: ICD-10-CM

## 2024-11-08 DIAGNOSIS — I25.10 ATHEROSCLEROTIC HEART DISEASE OF NATIVE CORONARY ARTERY W/OUT ANGINA PECTORIS: ICD-10-CM

## 2024-11-08 DIAGNOSIS — N18.30 CHRONIC KIDNEY DISEASE, STAGE 3 UNSPECIFIED: ICD-10-CM

## 2024-11-08 PROCEDURE — ZZZZZ: CPT | Mod: NC

## 2024-11-08 PROCEDURE — 99214 OFFICE O/P EST MOD 30 MIN: CPT | Mod: 25

## 2024-11-12 LAB
ALBUMIN SERPL ELPH-MCNC: 4 G/DL
ALP BLD-CCNC: 72 U/L
ALT SERPL-CCNC: 15 U/L
ANION GAP SERPL CALC-SCNC: 11 MMOL/L
AST SERPL-CCNC: 25 U/L
BILIRUB SERPL-MCNC: 0.3 MG/DL
BUN SERPL-MCNC: 24 MG/DL
CALCIUM SERPL-MCNC: 10.3 MG/DL
CHLORIDE SERPL-SCNC: 102 MMOL/L
CO2 SERPL-SCNC: 25 MMOL/L
CREAT SERPL-MCNC: 1.97 MG/DL
EGFR: 24 ML/MIN/1.73M2
GLUCOSE BLDC GLUCOMTR-MCNC: 104
GLUCOSE SERPL-MCNC: 87 MG/DL
HBA1C MFR BLD HPLC: 6.2
POTASSIUM SERPL-SCNC: 3.9 MMOL/L
PROT SERPL-MCNC: 7.5 G/DL
SODIUM SERPL-SCNC: 138 MMOL/L
T3FREE SERPL-MCNC: 3.2 PG/ML
T4 FREE SERPL-MCNC: 1.5 NG/DL
TSH SERPL-ACNC: 0.01 UIU/ML

## 2024-11-12 RX ORDER — METHIMAZOLE 5 MG/1
5 TABLET ORAL
Qty: 30 | Refills: 1 | Status: ACTIVE | COMMUNITY
Start: 2024-11-12 | End: 1900-01-01

## 2024-12-11 ENCOUNTER — APPOINTMENT (OUTPATIENT)
Dept: HEART AND VASCULAR | Facility: CLINIC | Age: 89
End: 2024-12-11
Payer: MEDICARE

## 2024-12-11 VITALS
WEIGHT: 106 LBS | SYSTOLIC BLOOD PRESSURE: 123 MMHG | DIASTOLIC BLOOD PRESSURE: 68 MMHG | HEART RATE: 66 BPM | OXYGEN SATURATION: 94 % | BODY MASS INDEX: 22.25 KG/M2 | HEIGHT: 58 IN

## 2024-12-11 DIAGNOSIS — I73.9 PERIPHERAL VASCULAR DISEASE, UNSPECIFIED: ICD-10-CM

## 2024-12-11 DIAGNOSIS — I50.9 HEART FAILURE, UNSPECIFIED: ICD-10-CM

## 2024-12-11 DIAGNOSIS — N18.30 CHRONIC KIDNEY DISEASE, STAGE 3 UNSPECIFIED: ICD-10-CM

## 2024-12-11 DIAGNOSIS — I25.10 ATHEROSCLEROTIC HEART DISEASE OF NATIVE CORONARY ARTERY W/OUT ANGINA PECTORIS: ICD-10-CM

## 2024-12-11 PROCEDURE — ZZZZZ: CPT | Mod: NC

## 2024-12-11 PROCEDURE — 93306 TTE W/DOPPLER COMPLETE: CPT

## 2024-12-11 PROCEDURE — 99214 OFFICE O/P EST MOD 30 MIN: CPT

## 2024-12-18 ENCOUNTER — APPOINTMENT (OUTPATIENT)
Dept: NEPHROLOGY | Facility: CLINIC | Age: 89
End: 2024-12-18

## 2024-12-20 NOTE — ED ADULT TRIAGE NOTE - PAIN RATING/NUMBER SCALE (0-10): ACTIVITY
I agree with the Care Coordinator's Plan of Care  Thank you Elizabeth. I agree - we can reach out and see about a possible virtual visit. I appreciate having that context of everything she's going through and how it's impacting her BP.     MA team - please reach out to [facility] to see if we can schedule a virtual visit. This can be on 12/23 as it is, or if she desires, we can do this today. Thanks      0

## 2025-01-06 ENCOUNTER — APPOINTMENT (OUTPATIENT)
Dept: NEPHROLOGY | Facility: CLINIC | Age: 89
End: 2025-01-06
Payer: MEDICARE

## 2025-01-06 VITALS — HEART RATE: 66 BPM | DIASTOLIC BLOOD PRESSURE: 64 MMHG | SYSTOLIC BLOOD PRESSURE: 118 MMHG

## 2025-01-06 DIAGNOSIS — N18.4 CHRONIC KIDNEY DISEASE, STAGE 4 (SEVERE): ICD-10-CM

## 2025-01-06 DIAGNOSIS — R31.29 OTHER MICROSCOPIC HEMATURIA: ICD-10-CM

## 2025-01-06 DIAGNOSIS — D47.2 MONOCLONAL GAMMOPATHY: ICD-10-CM

## 2025-01-06 DIAGNOSIS — N18.30 CHRONIC KIDNEY DISEASE, STAGE 3 UNSPECIFIED: ICD-10-CM

## 2025-01-06 DIAGNOSIS — I10 ESSENTIAL (PRIMARY) HYPERTENSION: ICD-10-CM

## 2025-01-06 DIAGNOSIS — I50.9 HEART FAILURE, UNSPECIFIED: ICD-10-CM

## 2025-01-06 DIAGNOSIS — E21.3 HYPERPARATHYROIDISM, UNSPECIFIED: ICD-10-CM

## 2025-01-06 DIAGNOSIS — E87.5 HYPERKALEMIA: ICD-10-CM

## 2025-01-06 DIAGNOSIS — E11.9 TYPE 2 DIABETES MELLITUS W/OUT COMPLICATIONS: ICD-10-CM

## 2025-01-06 PROCEDURE — 99214 OFFICE O/P EST MOD 30 MIN: CPT

## 2025-01-06 PROCEDURE — G2211 COMPLEX E/M VISIT ADD ON: CPT

## 2025-01-07 LAB
ALBUMIN SERPL ELPH-MCNC: 4.2 G/DL
ANION GAP SERPL CALC-SCNC: 11 MMOL/L
BUN SERPL-MCNC: 19 MG/DL
CALCIUM SERPL-MCNC: 11.1 MG/DL
CALCIUM SERPL-MCNC: 11.1 MG/DL
CHLORIDE SERPL-SCNC: 103 MMOL/L
CO2 SERPL-SCNC: 25 MMOL/L
CREAT SERPL-MCNC: 1.92 MG/DL
EGFR: 25 ML/MIN/1.73M2
GLUCOSE SERPL-MCNC: 87 MG/DL
HCT VFR BLD CALC: 33.8 %
HGB BLD-MCNC: 10.4 G/DL
IRON SATN MFR SERPL: 12 %
IRON SERPL-MCNC: 40 UG/DL
MCHC RBC-ENTMCNC: 27.2 PG
MCHC RBC-ENTMCNC: 30.8 G/DL
MCV RBC AUTO: 88.3 FL
PARATHYROID HORMONE INTACT: 75 PG/ML
PHOSPHATE SERPL-MCNC: 2.9 MG/DL
PLATELET # BLD AUTO: 277 K/UL
POTASSIUM SERPL-SCNC: 4.6 MMOL/L
RBC # BLD: 3.83 M/UL
RBC # FLD: 14.8 %
SODIUM SERPL-SCNC: 140 MMOL/L
TIBC SERPL-MCNC: 336 UG/DL
UIBC SERPL-MCNC: 296 UG/DL
WBC # FLD AUTO: 8.77 K/UL

## 2025-01-13 ENCOUNTER — NON-APPOINTMENT (OUTPATIENT)
Age: 89
End: 2025-01-13

## 2025-01-13 LAB
ALBUMIN MFR SERPL ELPH: 52.9 %
ALBUMIN SERPL-MCNC: 4.2 G/DL
ALBUMIN/GLOB SERPL: 1.1 RATIO
ALPHA1 GLOB MFR SERPL ELPH: 4.8 %
ALPHA1 GLOB SERPL ELPH-MCNC: 0.4 G/DL
ALPHA2 GLOB MFR SERPL ELPH: 9.5 %
ALPHA2 GLOB SERPL ELPH-MCNC: 0.8 G/DL
B-GLOBULIN MFR SERPL ELPH: 10.5 %
B-GLOBULIN SERPL ELPH-MCNC: 0.8 G/DL
DEPRECATED KAPPA LC FREE/LAMBDA SER: 8.17 RATIO
GAMMA GLOB FLD ELPH-MCNC: 1.8 G/DL
GAMMA GLOB MFR SERPL ELPH: 22.3 %
IGA SER QL IEP: 155 MG/DL
IGG SER QL IEP: 1865 MG/DL
IGM SER QL IEP: 138 MG/DL
INTERPRETATION SERPL IEP-IMP: NORMAL
KAPPA LC CSF-MCNC: 2.91 MG/DL
KAPPA LC SERPL-MCNC: 23.78 MG/DL
M PROTEIN MFR SERPL ELPH: 12.5 %
M PROTEIN SPEC IFE-MCNC: NORMAL
MONOCLON BAND OBS SERPL: 1 G/DL
PROT SERPL-MCNC: 8 G/DL
PROT SERPL-MCNC: 8 G/DL

## 2025-01-14 ENCOUNTER — RX ONLY (RX ONLY)
Age: 89
End: 2025-01-14

## 2025-01-14 ENCOUNTER — OFFICE (OUTPATIENT)
Dept: URBAN - METROPOLITAN AREA CLINIC 28 | Facility: CLINIC | Age: 89
Setting detail: OPHTHALMOLOGY
End: 2025-01-14
Payer: MEDICARE

## 2025-01-14 DIAGNOSIS — H47.233: ICD-10-CM

## 2025-01-14 DIAGNOSIS — H26.491: ICD-10-CM

## 2025-01-14 DIAGNOSIS — E11.9: ICD-10-CM

## 2025-01-14 DIAGNOSIS — H16.223: ICD-10-CM

## 2025-01-14 DIAGNOSIS — H40.1113: ICD-10-CM

## 2025-01-14 DIAGNOSIS — H40.1122: ICD-10-CM

## 2025-01-14 DIAGNOSIS — H01.004: ICD-10-CM

## 2025-01-14 DIAGNOSIS — H01.001: ICD-10-CM

## 2025-01-14 PROCEDURE — 92020 GONIOSCOPY: CPT | Performed by: OPHTHALMOLOGY

## 2025-01-14 PROCEDURE — 92014 COMPRE OPH EXAM EST PT 1/>: CPT | Performed by: OPHTHALMOLOGY

## 2025-01-14 ASSESSMENT — LID EXAM ASSESSMENTS
OD_BLEPHARITIS: RUL 1+
OS_BLEPHARITIS: LUL 1+

## 2025-01-14 ASSESSMENT — TEAR BREAK UP TIME (TBUT)
OS_TBUT: 1+ 2+
OD_TBUT: 1+ 2+

## 2025-01-14 ASSESSMENT — KERATOMETRY
OS_K2POWER_DIOPTERS: 43.25
OS_K1POWER_DIOPTERS: 41.50
OS_AXISANGLE_DEGREES: 170
OD_K1POWER_DIOPTERS: 40.50
OD_K2POWER_DIOPTERS: 42.75
OD_AXISANGLE_DEGREES: 003
METHOD_AUTO_MANUAL: AUTO

## 2025-01-14 ASSESSMENT — REFRACTION_CURRENTRX
OD_AXIS: 095
OS_OVR_VA: 20/
OS_AXIS: 120
OD_OVR_VA: 20/
OD_CYLINDER: -0.50
OS_CYLINDER: -0.50
OD_SPHERE: +3.00
OS_SPHERE: +3.00

## 2025-01-14 ASSESSMENT — REFRACTION_AUTOREFRACTION
OS_AXIS: 064
OD_SPHERE: +0.75
OS_CYLINDER: -1.00
OS_SPHERE: 0.00
OD_CYLINDER: -0.75
OD_AXIS: 097

## 2025-01-14 ASSESSMENT — PACHYMETRY
OS_CT_UM: 596
OD_CT_CORRECTION: -6
OD_CT_UM: 627
OS_CT_CORRECTION: -4

## 2025-01-14 ASSESSMENT — VISUAL ACUITY
OD_BCVA: 20/50-1
OS_BCVA: 20/40-2

## 2025-01-14 ASSESSMENT — CONFRONTATIONAL VISUAL FIELD TEST (CVF)
OD_FINDINGS: FULL
OS_FINDINGS: FULL

## 2025-01-14 ASSESSMENT — TONOMETRY
OS_IOP_MMHG: 13
OD_IOP_MMHG: 16

## 2025-01-15 LAB
APPEARANCE: ABNORMAL
BACTERIA: ABNORMAL /HPF
BILIRUBIN URINE: NEGATIVE
BLOOD URINE: NEGATIVE
CAST: 12 /LPF
COARSE GRANULAR CASTS: PRESENT
COLOR: YELLOW
CREAT SPEC-SCNC: 156 MG/DL
EPITHELIAL CELLS: 13 /HPF
GLUCOSE QUALITATIVE U: NEGATIVE MG/DL
HYALINE CASTS: PRESENT
KETONES URINE: NEGATIVE MG/DL
LEUKOCYTE ESTERASE URINE: ABNORMAL
MICROALBUMIN 24H UR DL<=1MG/L-MCNC: 12.8 MG/DL
MICROALBUMIN/CREAT 24H UR-RTO: 82 MG/G
MICROSCOPIC-UA: NORMAL
NITRITE URINE: NEGATIVE
PH URINE: 6
PROTEIN URINE: 100 MG/DL
RED BLOOD CELLS URINE: NORMAL /HPF
REVIEW: NORMAL
SPECIFIC GRAVITY URINE: 1.02
UROBILINOGEN URINE: 0.2 MG/DL
WHITE BLOOD CELLS URINE: 3 /HPF

## 2025-02-02 NOTE — PATIENT PROFILE ADULT - NSASFUNCLEVELADLAMBULATE_GEN_A_NUR
[facility]   part of [facility] Infectious Disease  Progress Note    Arleth Weiss Patient Status:  Inpatient    1952 MRN [id number]   Location [facility] 8NE-A Attending Cody Patel DO   Hosp Day # 5 PCP Viktoriya Garcias DO     Subjective:  Patient seen/examined.  She is up in bed in NAD.  L foot dressing with some dried blood.  No F/C.  No new issues overnight.    Objective:  Blood pressure 116/70, pulse 81, temperature 98 °F (36.7 °C), temperature source Oral, resp. rate 17, height 5' 9\" (1.753 m), weight 182 lb 8.7 oz (82.8 kg), SpO2 99%, not currently breastfeeding.    Intake/Output:    Intake/Output Summary (Last 24 hours) at 2025 1246  Last data filed at 2025 1210  Gross per 24 hour   Intake 770 ml   Output 2400 ml   Net -1630 ml       Physical Exam:  General: Awake, alert, non-tox, NAD.  HEENT:  Oropharynx clear, trachea ML.  Heart: RRR S1S2 no murmurs.  Lungs: Essentially CTA b/l, no rhonchi, rales, wheezes.  Abdomen: Soft, NT/ND.  BS present.  No organomegaly.  Extremity: L foot dressing with some dried blood.  Neurological: No focal deficits.  Derm:  Warm, dry, free from rashes.    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 with L 2nd toe arthroplasty and debridement, all infected material removed  - IV vancomycin ongoing post-op     2.  H/o  severe PVD  - s/p angiogram with Dr. Hood on 1/31/25     3.  R hand injury without radiographic abnormalities     4.  H/o MRSA sepsis in September 2024  - Known h/o TAVR  - LOYD without vegetations  - Treated with long course IV vancomycin  - Repeat blood cultures negative     5.  Disposition - inpatient.  Continue IV vancomycin as Rx while hospitalized.  If no evidence of continued bone involvement with OM, plan to step down to a short course of bactrim DS BID at discharge.  D/w patient.  Will follow.    Светлана Li DO, [facility] Infectious Disease  [phone]    2/2/2025  12:46 PM     0 = independent

## 2025-04-15 ENCOUNTER — APPOINTMENT (OUTPATIENT)
Dept: NEPHROLOGY | Facility: CLINIC | Age: 89
End: 2025-04-15
Payer: MEDICARE

## 2025-04-15 VITALS
SYSTOLIC BLOOD PRESSURE: 102 MMHG | HEART RATE: 68 BPM | BODY MASS INDEX: 22.15 KG/M2 | DIASTOLIC BLOOD PRESSURE: 64 MMHG | WEIGHT: 106 LBS

## 2025-04-15 DIAGNOSIS — R80.9 PROTEINURIA, UNSPECIFIED: ICD-10-CM

## 2025-04-15 DIAGNOSIS — D47.2 MONOCLONAL GAMMOPATHY: ICD-10-CM

## 2025-04-15 DIAGNOSIS — E11.9 TYPE 2 DIABETES MELLITUS W/OUT COMPLICATIONS: ICD-10-CM

## 2025-04-15 DIAGNOSIS — N18.4 CHRONIC KIDNEY DISEASE, STAGE 4 (SEVERE): ICD-10-CM

## 2025-04-15 DIAGNOSIS — E83.52 HYPERCALCEMIA: ICD-10-CM

## 2025-04-15 PROCEDURE — G2211 COMPLEX E/M VISIT ADD ON: CPT

## 2025-04-15 PROCEDURE — 99215 OFFICE O/P EST HI 40 MIN: CPT

## 2025-04-15 NOTE — REVIEW OF SYSTEMS
Ishan Orta, Just an FYI. Please let me know if you have any questions. Thanks, Jaime  [Feeling Tired] : feeling tired [Eyesight Problems] : eyesight problems [Arthralgias] : arthralgias [As Noted in HPI] : as noted in HPI [Joint Pain] : joint pain [Negative] : Psychiatric [FreeTextEntry8] : increased creatinine

## 2025-04-16 ENCOUNTER — APPOINTMENT (OUTPATIENT)
Dept: HEART AND VASCULAR | Facility: CLINIC | Age: 89
End: 2025-04-16
Payer: MEDICARE

## 2025-04-16 VITALS
HEIGHT: 58 IN | DIASTOLIC BLOOD PRESSURE: 50 MMHG | BODY MASS INDEX: 22.25 KG/M2 | SYSTOLIC BLOOD PRESSURE: 102 MMHG | HEART RATE: 80 BPM | WEIGHT: 106 LBS | TEMPERATURE: 97.8 F | OXYGEN SATURATION: 92 %

## 2025-04-16 DIAGNOSIS — I25.10 ATHEROSCLEROTIC HEART DISEASE OF NATIVE CORONARY ARTERY W/OUT ANGINA PECTORIS: ICD-10-CM

## 2025-04-16 DIAGNOSIS — E78.00 PURE HYPERCHOLESTEROLEMIA, UNSPECIFIED: ICD-10-CM

## 2025-04-16 PROCEDURE — 99214 OFFICE O/P EST MOD 30 MIN: CPT

## 2025-04-16 PROCEDURE — 93000 ELECTROCARDIOGRAM COMPLETE: CPT

## 2025-04-16 RX ORDER — METOPROLOL SUCCINATE 25 MG/1
25 TABLET, EXTENDED RELEASE ORAL
Refills: 0 | Status: ACTIVE | COMMUNITY

## 2025-04-16 RX ORDER — AMLODIPINE BESYLATE 5 MG/1
5 TABLET ORAL
Refills: 0 | Status: COMPLETED | COMMUNITY
End: 2025-04-16

## 2025-04-17 LAB
ALBUMIN SERPL ELPH-MCNC: 4.1 G/DL
ALP BLD-CCNC: 71 U/L
ALT SERPL-CCNC: 19 U/L
ANION GAP SERPL CALC-SCNC: 11 MMOL/L
AST SERPL-CCNC: 26 U/L
BILIRUB SERPL-MCNC: 0.2 MG/DL
BUN SERPL-MCNC: 21 MG/DL
CALCIUM SERPL-MCNC: 10.2 MG/DL
CHLORIDE SERPL-SCNC: 100 MMOL/L
CHOLEST SERPL-MCNC: 139 MG/DL
CO2 SERPL-SCNC: 24 MMOL/L
CREAT SERPL-MCNC: 2.6 MG/DL
EGFRCR SERPLBLD CKD-EPI 2021: 17 ML/MIN/1.73M2
ESTIMATED AVERAGE GLUCOSE: 134 MG/DL
GLUCOSE SERPL-MCNC: 135 MG/DL
HBA1C MFR BLD HPLC: 6.3 %
HCT VFR BLD CALC: 29.7 %
HDLC SERPL-MCNC: 64 MG/DL
HGB BLD-MCNC: 9.4 G/DL
LDLC SERPL-MCNC: 61 MG/DL
MCHC RBC-ENTMCNC: 27.2 PG
MCHC RBC-ENTMCNC: 31.6 G/DL
MCV RBC AUTO: 85.8 FL
NONHDLC SERPL-MCNC: 75 MG/DL
PLATELET # BLD AUTO: 268 K/UL
POTASSIUM SERPL-SCNC: 3.5 MMOL/L
PROT SERPL-MCNC: 7.3 G/DL
RBC # BLD: 3.46 M/UL
RBC # FLD: 15.7 %
SODIUM SERPL-SCNC: 135 MMOL/L
TRIGL SERPL-MCNC: 71 MG/DL
WBC # FLD AUTO: 6.85 K/UL

## 2025-04-29 ENCOUNTER — OFFICE (OUTPATIENT)
Dept: URBAN - METROPOLITAN AREA CLINIC 28 | Facility: CLINIC | Age: 89
Setting detail: OPHTHALMOLOGY
End: 2025-04-29
Payer: MEDICARE

## 2025-04-29 DIAGNOSIS — H01.001: ICD-10-CM

## 2025-04-29 DIAGNOSIS — H01.004: ICD-10-CM

## 2025-04-29 DIAGNOSIS — H16.223: ICD-10-CM

## 2025-04-29 DIAGNOSIS — H26.491: ICD-10-CM

## 2025-04-29 DIAGNOSIS — H40.1122: ICD-10-CM

## 2025-04-29 DIAGNOSIS — H47.233: ICD-10-CM

## 2025-04-29 DIAGNOSIS — H40.1113: ICD-10-CM

## 2025-04-29 PROCEDURE — 99213 OFFICE O/P EST LOW 20 MIN: CPT | Performed by: OPHTHALMOLOGY

## 2025-04-29 ASSESSMENT — REFRACTION_CURRENTRX
OD_OVR_VA: 20/
OD_AXIS: 095
OS_AXIS: 120
OS_OVR_VA: 20/
OS_CYLINDER: -0.50
OD_CYLINDER: -0.50
OS_SPHERE: +3.00
OD_SPHERE: +3.00

## 2025-04-29 ASSESSMENT — REFRACTION_AUTOREFRACTION
OS_CYLINDER: -1.75
OS_AXIS: 065
OD_SPHERE: +0.75
OD_AXIS: 090
OD_CYLINDER: -1.25
OS_SPHERE: +0.25

## 2025-04-29 ASSESSMENT — VISUAL ACUITY
OD_BCVA: 20/30-1
OS_BCVA: 20/30-1

## 2025-04-29 ASSESSMENT — KERATOMETRY
OD_K1POWER_DIOPTERS: 41.00
OS_K2POWER_DIOPTERS: 43.00
METHOD_AUTO_MANUAL: AUTO
OD_K2POWER_DIOPTERS: 42.75
OD_AXISANGLE_DEGREES: 002
OS_AXISANGLE_DEGREES: 168
OS_K1POWER_DIOPTERS: 41.25

## 2025-04-29 ASSESSMENT — LID EXAM ASSESSMENTS
OS_BLEPHARITIS: LUL 1+
OD_BLEPHARITIS: RUL 1+

## 2025-04-29 ASSESSMENT — TONOMETRY
OS_IOP_MMHG: 10
OD_IOP_MMHG: 15

## 2025-04-29 ASSESSMENT — CONFRONTATIONAL VISUAL FIELD TEST (CVF)
OD_FINDINGS: FULL
OS_FINDINGS: FULL

## 2025-04-29 ASSESSMENT — PACHYMETRY
OS_CT_CORRECTION: -4
OS_CT_UM: 596
OD_CT_CORRECTION: -6
OD_CT_UM: 627

## 2025-04-29 ASSESSMENT — TEAR BREAK UP TIME (TBUT)
OD_TBUT: 1+ 2+
OS_TBUT: 1+ 2+

## 2025-05-07 ENCOUNTER — APPOINTMENT (OUTPATIENT)
Dept: ENDOCRINOLOGY | Facility: CLINIC | Age: 89
End: 2025-05-07
Payer: MEDICARE

## 2025-05-07 VITALS
WEIGHT: 101 LBS | SYSTOLIC BLOOD PRESSURE: 95 MMHG | BODY MASS INDEX: 21.11 KG/M2 | DIASTOLIC BLOOD PRESSURE: 49 MMHG | HEART RATE: 65 BPM

## 2025-05-07 DIAGNOSIS — E04.2 NONTOXIC MULTINODULAR GOITER: ICD-10-CM

## 2025-05-07 DIAGNOSIS — M81.0 AGE-RELATED OSTEOPOROSIS W/OUT CURRENT PATHOLOGICAL FRACTURE: ICD-10-CM

## 2025-05-07 DIAGNOSIS — E05.90 THYROTOXICOSIS, UNSPECIFIED W/OUT THYROTOXIC CRISIS OR STORM: ICD-10-CM

## 2025-05-07 LAB — GLUCOSE BLDC GLUCOMTR-MCNC: 134

## 2025-05-07 PROCEDURE — 82962 GLUCOSE BLOOD TEST: CPT

## 2025-05-07 PROCEDURE — 36415 COLL VENOUS BLD VENIPUNCTURE: CPT

## 2025-05-07 PROCEDURE — G2211 COMPLEX E/M VISIT ADD ON: CPT

## 2025-05-07 PROCEDURE — 99215 OFFICE O/P EST HI 40 MIN: CPT

## 2025-05-08 LAB
ANION GAP SERPL CALC-SCNC: 12 MMOL/L
BUN SERPL-MCNC: 20 MG/DL
CALCIUM SERPL-MCNC: 9.9 MG/DL
CHLORIDE SERPL-SCNC: 104 MMOL/L
CHOLEST SERPL-MCNC: 132 MG/DL
CO2 SERPL-SCNC: 22 MMOL/L
CREAT SERPL-MCNC: 2.79 MG/DL
CREAT SPEC-SCNC: 109 MG/DL
EGFRCR SERPLBLD CKD-EPI 2021: 16 ML/MIN/1.73M2
ESTIMATED AVERAGE GLUCOSE: 128 MG/DL
GLUCOSE SERPL-MCNC: 96 MG/DL
HBA1C MFR BLD HPLC: 6.1 %
HDLC SERPL-MCNC: 59 MG/DL
LDLC SERPL-MCNC: 59 MG/DL
MICROALBUMIN 24H UR DL<=1MG/L-MCNC: 16.5 MG/DL
MICROALBUMIN/CREAT 24H UR-RTO: 152 MG/G
NONHDLC SERPL-MCNC: 73 MG/DL
POTASSIUM SERPL-SCNC: 4.4 MMOL/L
SODIUM SERPL-SCNC: 139 MMOL/L
T4 FREE SERPL-MCNC: 1 NG/DL
TRIGL SERPL-MCNC: 75 MG/DL
TSH SERPL-ACNC: 0.08 UIU/ML

## 2025-05-13 ENCOUNTER — APPOINTMENT (OUTPATIENT)
Dept: HEART AND VASCULAR | Facility: CLINIC | Age: 89
End: 2025-05-13
Payer: MEDICARE

## 2025-05-13 VITALS
OXYGEN SATURATION: 95 % | BODY MASS INDEX: 21.2 KG/M2 | WEIGHT: 101 LBS | DIASTOLIC BLOOD PRESSURE: 55 MMHG | HEIGHT: 58 IN | HEART RATE: 64 BPM | SYSTOLIC BLOOD PRESSURE: 112 MMHG | TEMPERATURE: 97.3 F

## 2025-05-13 DIAGNOSIS — I73.9 PERIPHERAL VASCULAR DISEASE, UNSPECIFIED: ICD-10-CM

## 2025-05-13 DIAGNOSIS — I25.10 ATHEROSCLEROTIC HEART DISEASE OF NATIVE CORONARY ARTERY W/OUT ANGINA PECTORIS: ICD-10-CM

## 2025-05-13 DIAGNOSIS — E78.00 PURE HYPERCHOLESTEROLEMIA, UNSPECIFIED: ICD-10-CM

## 2025-05-13 DIAGNOSIS — I65.29 OCCLUSION AND STENOSIS OF UNSPECIFIED CAROTID ARTERY: ICD-10-CM

## 2025-05-13 DIAGNOSIS — E11.9 TYPE 2 DIABETES MELLITUS W/OUT COMPLICATIONS: ICD-10-CM

## 2025-05-13 DIAGNOSIS — N18.4 CHRONIC KIDNEY DISEASE, STAGE 4 (SEVERE): ICD-10-CM

## 2025-05-13 DIAGNOSIS — I10 ESSENTIAL (PRIMARY) HYPERTENSION: ICD-10-CM

## 2025-05-13 PROCEDURE — 93923 UPR/LXTR ART STDY 3+ LVLS: CPT

## 2025-05-13 PROCEDURE — 99214 OFFICE O/P EST MOD 30 MIN: CPT | Mod: 25

## 2025-05-13 RX ORDER — FUROSEMIDE 20 MG/1
20 TABLET ORAL
Refills: 0 | Status: ACTIVE | COMMUNITY

## 2025-05-13 RX ORDER — ASPIRIN 81 MG
81 TABLET, DELAYED RELEASE (ENTERIC COATED) ORAL
Refills: 0 | Status: ACTIVE | COMMUNITY

## 2025-06-18 DIAGNOSIS — E04.2 NONTOXIC MULTINODULAR GOITER: ICD-10-CM

## 2025-06-18 DIAGNOSIS — E05.90 THYROTOXICOSIS, UNSPECIFIED WITHOUT THYROTOXIC CRISIS OR STORM: ICD-10-CM

## 2025-07-22 ENCOUNTER — OFFICE (OUTPATIENT)
Dept: URBAN - METROPOLITAN AREA CLINIC 28 | Facility: CLINIC | Age: 89
Setting detail: OPHTHALMOLOGY
End: 2025-07-22

## 2025-07-22 ENCOUNTER — APPOINTMENT (OUTPATIENT)
Dept: ENDOCRINOLOGY | Facility: CLINIC | Age: 89
End: 2025-07-22
Payer: MEDICARE

## 2025-07-22 VITALS
DIASTOLIC BLOOD PRESSURE: 64 MMHG | HEART RATE: 67 BPM | SYSTOLIC BLOOD PRESSURE: 126 MMHG | WEIGHT: 98 LBS | BODY MASS INDEX: 20.48 KG/M2

## 2025-07-22 DIAGNOSIS — E04.2 NONTOXIC MULTINODULAR GOITER: ICD-10-CM

## 2025-07-22 DIAGNOSIS — R79.89 OTHER SPECIFIED ABNORMAL FINDINGS OF BLOOD CHEMISTRY: ICD-10-CM

## 2025-07-22 DIAGNOSIS — M81.0 AGE-RELATED OSTEOPOROSIS W/OUT CURRENT PATHOLOGICAL FRACTURE: ICD-10-CM
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
## 2025-07-22 DIAGNOSIS — E05.90 THYROTOXICOSIS, UNSPECIFIED W/OUT THYROTOXIC CRISIS OR STORM: ICD-10-CM

## 2025-07-22 DIAGNOSIS — E11.9 TYPE 2 DIABETES MELLITUS W/OUT COMPLICATIONS: ICD-10-CM

## 2025-07-22 PROCEDURE — 99215 OFFICE O/P EST HI 40 MIN: CPT

## 2025-07-22 PROCEDURE — 82962 GLUCOSE BLOOD TEST: CPT

## 2025-07-22 PROCEDURE — NO SHOW FE NO SHOW FEE: Performed by: OPHTHALMOLOGY

## 2025-07-22 PROCEDURE — 83036 HEMOGLOBIN GLYCOSYLATED A1C: CPT | Mod: QW

## 2025-07-22 PROCEDURE — 36415 COLL VENOUS BLD VENIPUNCTURE: CPT

## 2025-07-23 RX ORDER — METHIMAZOLE 5 MG/1
5 TABLET ORAL
Qty: 20 | Refills: 2 | Status: ACTIVE | COMMUNITY
Start: 2025-07-23 | End: 1900-01-01

## 2025-07-26 LAB
ALBUMIN, RANDOM URINE: 5.7 MG/DL
ANION GAP SERPL CALC-SCNC: 14 MMOL/L
BUN SERPL-MCNC: 23 MG/DL
CALCIUM SERPL-MCNC: 10.9 MG/DL
CHLORIDE SERPL-SCNC: 99 MMOL/L
CHOLEST SERPL-MCNC: 150 MG/DL
CO2 SERPL-SCNC: 24 MMOL/L
CREAT SERPL-MCNC: 1.83 MG/DL
CREAT SPEC-SCNC: 78 MG/DL
EGFRCR SERPLBLD CKD-EPI 2021: 26 ML/MIN/1.73M2
ESTIMATED AVERAGE GLUCOSE: 137 MG/DL
GLUCOSE BLDC GLUCOMTR-MCNC: 147
GLUCOSE SERPL-MCNC: 97 MG/DL
HBA1C MFR BLD HPLC: 5.9
HBA1C MFR BLD HPLC: 6.4 %
HDLC SERPL-MCNC: 52 MG/DL
LDLC SERPL-MCNC: 76 MG/DL
MICROALBUMIN/CREAT 24H UR-RTO: 73 MG/G
NONHDLC SERPL-MCNC: 97 MG/DL
POTASSIUM SERPL-SCNC: 4.2 MMOL/L
SODIUM SERPL-SCNC: 138 MMOL/L
T3 SERPL-MCNC: 94 NG/DL
T4 FREE SERPL-MCNC: 1 NG/DL
THYROGLOB AB SERPL-ACNC: 22.7 IU/ML
THYROPEROXIDASE AB SERPL IA-ACNC: 38.8 IU/ML
TRIGL SERPL-MCNC: 118 MG/DL
TSH SERPL-ACNC: 0.1 UIU/ML

## 2025-08-06 ENCOUNTER — NON-APPOINTMENT (OUTPATIENT)
Age: 89
End: 2025-08-06

## 2025-08-06 ENCOUNTER — APPOINTMENT (OUTPATIENT)
Dept: HEART AND VASCULAR | Facility: CLINIC | Age: 89
End: 2025-08-06
Payer: MEDICARE

## 2025-08-06 VITALS
HEIGHT: 58 IN | WEIGHT: 98 LBS | DIASTOLIC BLOOD PRESSURE: 60 MMHG | TEMPERATURE: 97.4 F | HEART RATE: 76 BPM | SYSTOLIC BLOOD PRESSURE: 124 MMHG | OXYGEN SATURATION: 99 % | BODY MASS INDEX: 20.57 KG/M2

## 2025-08-06 DIAGNOSIS — I10 ESSENTIAL (PRIMARY) HYPERTENSION: ICD-10-CM

## 2025-08-06 DIAGNOSIS — R53.83 OTHER FATIGUE: ICD-10-CM

## 2025-08-06 DIAGNOSIS — I25.10 ATHEROSCLEROTIC HEART DISEASE OF NATIVE CORONARY ARTERY W/OUT ANGINA PECTORIS: ICD-10-CM

## 2025-08-06 DIAGNOSIS — R06.09 OTHER FORMS OF DYSPNEA: ICD-10-CM

## 2025-08-06 PROCEDURE — 93000 ELECTROCARDIOGRAM COMPLETE: CPT

## 2025-08-06 PROCEDURE — 99214 OFFICE O/P EST MOD 30 MIN: CPT

## 2025-09-09 ENCOUNTER — RX RENEWAL (OUTPATIENT)
Age: 89
End: 2025-09-09